# Patient Record
Sex: FEMALE | Race: WHITE | NOT HISPANIC OR LATINO | Employment: OTHER | URBAN - METROPOLITAN AREA
[De-identification: names, ages, dates, MRNs, and addresses within clinical notes are randomized per-mention and may not be internally consistent; named-entity substitution may affect disease eponyms.]

---

## 2017-01-05 ENCOUNTER — HOSPITAL ENCOUNTER (OUTPATIENT)
Dept: PREADMISSION TESTING | Facility: HOSPITAL | Age: 77
Discharge: HOME | End: 2017-01-05
Attending: ORTHOPAEDIC SURGERY
Payer: MEDICARE

## 2017-01-05 VITALS
HEIGHT: 64 IN | SYSTOLIC BLOOD PRESSURE: 128 MMHG | BODY MASS INDEX: 36.77 KG/M2 | DIASTOLIC BLOOD PRESSURE: 70 MMHG | RESPIRATION RATE: 16 BRPM | WEIGHT: 215.38 LBS | HEART RATE: 59 BPM | TEMPERATURE: 98 F | OXYGEN SATURATION: 97 %

## 2017-01-05 DIAGNOSIS — Z01.818 PRE-OP TESTING: Primary | ICD-10-CM

## 2017-01-05 LAB
ANION GAP SERPL CALC-SCNC: 10 MMOL/L
BASOPHILS # BLD AUTO: 0.02 K/UL
BASOPHILS NFR BLD: 0.2 %
BUN SERPL-MCNC: 26 MG/DL
CALCIUM SERPL-MCNC: 10.2 MG/DL
CHLORIDE SERPL-SCNC: 103 MMOL/L
CO2 SERPL-SCNC: 26 MMOL/L
CREAT SERPL-MCNC: 1.4 MG/DL
DIFFERENTIAL METHOD: NORMAL
EOSINOPHIL # BLD AUTO: 0.2 K/UL
EOSINOPHIL NFR BLD: 2.5 %
ERYTHROCYTE [DISTWIDTH] IN BLOOD BY AUTOMATED COUNT: 14.3 %
EST. GFR  (AFRICAN AMERICAN): 42 ML/MIN/1.73 M^2
EST. GFR  (NON AFRICAN AMERICAN): 36 ML/MIN/1.73 M^2
GLUCOSE SERPL-MCNC: 88 MG/DL
HCT VFR BLD AUTO: 41.5 %
HGB BLD-MCNC: 13.7 G/DL
LYMPHOCYTES # BLD AUTO: 2.8 K/UL
LYMPHOCYTES NFR BLD: 34.7 %
MCH RBC QN AUTO: 30.6 PG
MCHC RBC AUTO-ENTMCNC: 33 %
MCV RBC AUTO: 93 FL
MONOCYTES # BLD AUTO: 0.7 K/UL
MONOCYTES NFR BLD: 8.4 %
NEUTROPHILS # BLD AUTO: 4.4 K/UL
NEUTROPHILS NFR BLD: 54.2 %
PLATELET # BLD AUTO: 231 K/UL
PMV BLD AUTO: 11.2 FL
POTASSIUM SERPL-SCNC: 3.9 MMOL/L
RBC # BLD AUTO: 4.47 M/UL
SODIUM SERPL-SCNC: 139 MMOL/L
WBC # BLD AUTO: 8.09 K/UL

## 2017-01-05 PROCEDURE — 36415 COLL VENOUS BLD VENIPUNCTURE: CPT

## 2017-01-05 PROCEDURE — 80048 BASIC METABOLIC PNL TOTAL CA: CPT

## 2017-01-05 PROCEDURE — 85025 COMPLETE CBC W/AUTO DIFF WBC: CPT

## 2017-01-05 PROCEDURE — 93005 ELECTROCARDIOGRAM TRACING: CPT

## 2017-01-05 RX ORDER — ERGOCALCIFEROL 1.25 MG/1
50000 CAPSULE ORAL
COMMUNITY

## 2017-01-05 RX ORDER — HYDROCODONE BITARTRATE AND ACETAMINOPHEN 5; 325 MG/1; MG/1
1 TABLET ORAL EVERY 8 HOURS PRN
Status: ON HOLD | COMMUNITY
End: 2017-03-21 | Stop reason: HOSPADM

## 2017-01-05 RX ORDER — DILTIAZEM HYDROCHLORIDE 180 MG/1
180 CAPSULE, COATED, EXTENDED RELEASE ORAL DAILY
Status: ON HOLD | COMMUNITY
End: 2019-07-26 | Stop reason: HOSPADM

## 2017-01-05 NOTE — IP AVS SNAPSHOT
[place] [address] [place] [address]  Phone: [phone]           Patient Discharge Instructions    Our goal is to set you up for success. This packet includes information on your condition, medications, and your home care. It will help you to care for yourself so you don't get sicker.     Please ask your nurse if you have any questions.        There are many details to remember when preparing for your surgery. Here is what you will need to do, please ask your nurse if there are more specific instructions and if you have any questions:    1. 24 hours before procedure Do not smoke or drink alcoholic beverages 24 hours prior to your procedure    2. Eating before procedure Do not eat or drink anything 8 hours before your procedure - this includes gum, mints, and candy.     3. Day of procedure Please remove all jewelry for the procedure. If you wear contact lenses, dentures, hearing aids or glasses, bring a container to put them in during your surgery and give to a family member for safekeeping.  If your doctor has scheduled you for an overnight stay, bring a small overnight bag with any personal items that you need.    4. After procedure Make arrangements in advance for transportation home by a responsible adult. It is not safe to drive a vehicle during the 24 hours following surgery.     PLEASE NOTE: You may be contacted the day before your surgery to confirm your surgery date and arrival time. The Surgery schedule has many variables which may affect the time of your surgery case. Family members should be available if your surgery time changes.                ** Verify the list of medication(s) below is accurate and up to date. Carry this with you in case of emergency. If your medications have changed, please notify your healthcare provider.             Medication List      TAKE these medications        Additional Info                      diltiaZEM 180 MG 24 hr capsule    Commonly known as:  CARDIZEM CD   Refills:  0   Dose:  180 mg    Instructions:  Take 180 mg by mouth once daily.     Begin Date    AM    Noon    PM    Bedtime       hydrocodone-acetaminophen 5-325mg 5-325 mg per tablet   Commonly known as:  NORCO   Refills:  0   Dose:  1 tablet    Instructions:  Take 1 tablet by mouth every 8 (eight) hours as needed for Pain.     Begin Date    AM    Noon    PM    Bedtime       levothyroxine 100 MCG tablet   Commonly known as:  SYNTHROID   Refills:  0   Dose:  100 mcg    Instructions:  Take 100 mcg by mouth once daily.     Begin Date    AM    Noon    PM    Bedtime       sertraline 100 MG tablet   Commonly known as:  ZOLOFT   Refills:  0   Dose:  100 mg    Instructions:  Take 100 mg by mouth once daily.     Begin Date    AM    Noon    PM    Bedtime       triamterene-hydrochlorothiazide 37.5-25 mg 37.5-25 mg per tablet   Commonly known as:  MAXZIDE-25   Refills:  0   Dose:  1 tablet    Instructions:  Take 1 tablet by mouth once daily.     Begin Date    AM    Noon    PM    Bedtime       VITAMIN D2 50,000 unit Cap   Refills:  0   Dose:  56198 Units   Generic drug:  ergocalciferol    Instructions:  Take 50,000 Units by mouth every 7 days. Taking on Monday's     Begin Date    AM    Noon    PM    Bedtime                  Please bring to all follow up appointments:    1. A copy of your discharge instructions.  2. All medicines you are currently taking in their original bottles.  3. Identification and insurance card.    Please arrive 15 minutes ahead of scheduled appointment time.    Please call 24 hours in advance if you must reschedule your appointment and/or time.        Your Future Surgeries/Procedures     Jan 09, 2017   Surgery with Sav Green MD   [facility] ([facility])    [address] [place] [place] [address]   [phone]                  Discharge Instructions       For this procedure you will shower at home the night before and also the  "morning of surgery with HIBICLENS soap provided by the pre op nurse. Do not use this soap on your face or genitals. You will shower a third time here at the hospital on the morning of surgery. Rinse completely after each shower.  Please place clean linens on your bed the night before surgery. Please wear fresh clean clothing after each shower.  No shaving of procedural area at least 4-5 days before surgery due to increased risk of skin irritation and/or possible infection.        Your surgery is scheduled for _Monday Jan. 9, 2017_.    Call [phone] between 2 p.m. and 5 p.m. on   _Friday_ to find out your arrival time for the day of your surgery.      Please report to SAME DAY SURGERY UNIT on the 2nd FLOOR at _______ a.m.  Use front door entrance. The doors open at 0530 am.      If you need WHEELCHAIR assistance please call  [phone] from your cell phone or "0"  from the  hospital courtesy phone located to the right after you enter the hospital lobby.      INSTRUCTIONS IMPORTANT!!!  ¨ Do not eat or drink after 12 midnight-including water. OK to brush teeth, no   gum, candy or mints!    ¨ Take only these medicines with a small swallow of water-morning of surgery.  Take Zoloft, Maxide, Cartia, and Levothyroxine am of surgery with swallow of water.      _x___  Prep instructions:    SHOWER   _  _x___  Please shower using Hibiclens soap the night before AND  the morning of  your surgery/procedure. Do not use Hibiclens on your face or genitals        _x___  No powder, lotions or creams to surgical area.  _x___  You may wear only deodorant on the day of surgery.  _x___  Please remove all jewelry, including piercings and leave at home.  .  _x___  Wear loose fitting clothing. Allow for dressings, bandages.  _x___  Stop Aspirin, Ibuprofen, Motrin and Aleve at least 3-5 days before surgery, unless otherwise instructed by your doctor, or the nurse.              You MAY use Tylenol/acetaminophen until day of surgery.    _x___  " "Call MD for temperature above 101 degrees.           I have read or had read and explained to me, and understand the above information.  Additional comments or instructions:Please call   [phone] if you have any questions regarding the instructions above.                 Admission Information     Date & Time Provider Department CSN    1/5/2017  9:00 AM Sav Green MD [facility] [id number]      Care Providers     Provider Role Specialty Primary office phone    Sav Green MD Attending Provider Orthopedic Surgery [phone]      Your Vitals Were     BP Pulse Temp Resp Height Weight    128/70 (BP Location: Left arm, Patient Position: Sitting, BP Method: Automatic) 59 97.7 °F (36.5 °C) (Oral) 16 5' 3.5" (1.613 m) 97.7 kg (215 lb 6.2 oz)    SpO2 BMI             97% 37.56 kg/m2         Recent Lab Values     No lab values to display.      Allergies as of 1/5/2017        Reactions    Aspirin Anaphylaxis    Codeine Itching    Keflex [Cephalexin] Itching    Penicillins Hives      [facility]     [facility] Nurse Care Line - 24/7 Assistance  Unless otherwise directed by your provider, please contact [facility], our nurse care line that is available for 24/7 assistance.     Registered nurses in the [facility] provide clinical advisement, health education, appointment booking, and other advisory services.  Call for this free service at [phone].        Advance Directives     An advance directive is a document which, in the event you are no longer able to make decisions for yourself, tells your healthcare team what kind of treatment you do or do not want to receive, or who you would like to make those decisions for you.  If you do not currently have an advance directive, [facility] encourages you to create one.  For more information call:  [phone]), [phone] ([phone]),  or log on to [email or web address].        Smoking Cessation  "    If you would like to quit smoking:   You may be eligible for free services if you are a [place] resident and started smoking cigarettes before September 1, 1988.  Call the Smoking Cessation [employer] (S[employer]) toll free at (918) 055-0459 or (490) 078-8450.   Call 5-894-QUIT-NOW if you do not meet the above criteria.            Language Assistance Services     ATTENTION: Language assistance services are available, free of charge. Please call [phone].      ATENCIÓN: Si habla manan, tiene a dumont disposición servicios gratuitos de asistencia lingüística. Llame al [phone].     CHÚ Ý: N?u b?n nói Ti?ng Vi?t, có các d?ch v? h? tr? ngôn ng? mi?n phí dành cho b?n. G?i s? [phone].         [facility] complies with applicable Federal civil rights laws and does not discriminate on the basis of race, color, national origin, age, disability, or sex.

## 2017-01-05 NOTE — IP AVS SNAPSHOT
[address] [place] [address]  Phone: [phone]           I have received a copy of my After Visit Summary and discharge instructions from [facility].    INSTRUCTIONS RECEIVED AND UNDERSTOOD BY:                     Patient/Patient Representative: ________________________________________________________________     Date/Time: ________________________________________________________________                     Instructions Given By: ________________________________________________________________     Date/Time: ________________________________________________________________

## 2017-01-05 NOTE — DISCHARGE INSTRUCTIONS
"For this procedure you will shower at home the night before and also the morning of surgery with HIBICLENS soap provided by the pre op nurse. Do not use this soap on your face or genitals. You will shower a third time here at the hospital on the morning of surgery. Rinse completely after each shower.  Please place clean linens on your bed the night before surgery. Please wear fresh clean clothing after each shower.  No shaving of procedural area at least 4-5 days before surgery due to increased risk of skin irritation and/or possible infection.        Your surgery is scheduled for _Monday Jan. 9, 2017_.    Call [phone] between 2 p.m. and 5 p.m. on   _Friday_ to find out your arrival time for the day of your surgery.      Please report to SAME DAY SURGERY UNIT on the 2nd FLOOR at _______ a.m.  Use front door entrance. The doors open at 0530 am.      If you need WHEELCHAIR assistance please call  [phone] from your cell phone or "0"  from the  hospital courtesy phone located to the right after you enter the hospital lobby.      INSTRUCTIONS IMPORTANT!!!  ¨ Do not eat or drink after 12 midnight-including water. OK to brush teeth, no   gum, candy or mints!    ¨ Take only these medicines with a small swallow of water-morning of surgery.  Take Zoloft, Maxide, Cartia, and Levothyroxine am of surgery with swallow of water.      _x___  Prep instructions:    SHOWER   _  _x___  Please shower using Hibiclens soap the night before AND  the morning of  your surgery/procedure. Do not use Hibiclens on your face or genitals        _x___  No powder, lotions or creams to surgical area.  _x___  You may wear only deodorant on the day of surgery.  _x___  Please remove all jewelry, including piercings and leave at home.  .  _x___  Wear loose fitting clothing. Allow for dressings, bandages.  _x___  Stop Aspirin, Ibuprofen, Motrin and Aleve at least 3-5 days before surgery, unless otherwise instructed by your doctor, or the nurse.          "     You MAY use Tylenol/acetaminophen until day of surgery.    _x___  Call MD for temperature above 101 degrees.           I have read or had read and explained to me, and understand the above information.  Additional comments or instructions:Please call   [phone] if you have any questions regarding the instructions above.

## 2017-01-05 NOTE — PRE-PROCEDURE INSTRUCTIONS
Pre operative instructions, medication directives and pain scales/post op pain management discussed with patient. Instructed to wash with Hibiclens as directed before surgery.  All questions were answered. Patient re-assured regarding surgical procedure and day of surgery routine as needed. Patient verbalized understanding of pre-op instructions.

## 2017-01-08 ENCOUNTER — ANESTHESIA EVENT (OUTPATIENT)
Dept: SURGERY | Facility: HOSPITAL | Age: 77
DRG: 467 | End: 2017-01-08
Payer: MEDICARE

## 2017-01-09 ENCOUNTER — ANESTHESIA (OUTPATIENT)
Dept: SURGERY | Facility: HOSPITAL | Age: 77
DRG: 467 | End: 2017-01-09
Payer: MEDICARE

## 2017-01-09 PROBLEM — T84.030A: Status: ACTIVE | Noted: 2017-01-09

## 2017-01-09 PROCEDURE — D9220A PRA ANESTHESIA: Mod: CRNA,,, | Performed by: NURSE ANESTHETIST, CERTIFIED REGISTERED

## 2017-01-09 PROCEDURE — 25000003 PHARM REV CODE 250: Performed by: ANESTHESIOLOGY

## 2017-01-09 PROCEDURE — 63600175 PHARM REV CODE 636 W HCPCS: Performed by: NURSE ANESTHETIST, CERTIFIED REGISTERED

## 2017-01-09 PROCEDURE — D9220A PRA ANESTHESIA: Mod: ANES,,, | Performed by: ANESTHESIOLOGY

## 2017-01-09 PROCEDURE — 25000003 PHARM REV CODE 250: Performed by: ORTHOPAEDIC SURGERY

## 2017-01-09 PROCEDURE — 63600175 PHARM REV CODE 636 W HCPCS: Performed by: ORTHOPAEDIC SURGERY

## 2017-01-09 PROCEDURE — 25000003 PHARM REV CODE 250: Performed by: NURSE ANESTHETIST, CERTIFIED REGISTERED

## 2017-01-09 PROCEDURE — 63600175 PHARM REV CODE 636 W HCPCS

## 2017-01-09 RX ORDER — ONDANSETRON 2 MG/ML
INJECTION INTRAMUSCULAR; INTRAVENOUS
Status: DISCONTINUED | OUTPATIENT
Start: 2017-01-09 | End: 2017-01-09

## 2017-01-09 RX ORDER — PROPOFOL 10 MG/ML
VIAL (ML) INTRAVENOUS
Status: DISCONTINUED | OUTPATIENT
Start: 2017-01-09 | End: 2017-01-09

## 2017-01-09 RX ORDER — METOCLOPRAMIDE HYDROCHLORIDE 5 MG/ML
INJECTION INTRAMUSCULAR; INTRAVENOUS
Status: DISCONTINUED | OUTPATIENT
Start: 2017-01-09 | End: 2017-01-09

## 2017-01-09 RX ORDER — LIDOCAINE HCL/PF 100 MG/5ML
SYRINGE (ML) INTRAVENOUS
Status: DISCONTINUED | OUTPATIENT
Start: 2017-01-09 | End: 2017-01-09

## 2017-01-09 RX ADMIN — BUPIVACAINE HYDROCHLORIDE 3 ML: 5 INJECTION, SOLUTION EPIDURAL; INTRACAUDAL; PERINEURAL at 07:01

## 2017-01-09 RX ADMIN — PROPOFOL 20 MG: 10 INJECTION, EMULSION INTRAVENOUS at 08:01

## 2017-01-09 RX ADMIN — PROPOFOL 20 MG: 10 INJECTION, EMULSION INTRAVENOUS at 10:01

## 2017-01-09 RX ADMIN — PROPOFOL 20 MG: 10 INJECTION, EMULSION INTRAVENOUS at 09:01

## 2017-01-09 RX ADMIN — LIDOCAINE HYDROCHLORIDE 5 ML: 20 INJECTION, SOLUTION INTRAVENOUS at 07:01

## 2017-01-09 RX ADMIN — FENTANYL CITRATE 50 MCG: 50 INJECTION INTRAMUSCULAR; INTRAVENOUS at 10:01

## 2017-01-09 RX ADMIN — PROPOFOL 30 MG: 10 INJECTION, EMULSION INTRAVENOUS at 07:01

## 2017-01-09 RX ADMIN — MIDAZOLAM HYDROCHLORIDE 1 MG: 1 INJECTION, SOLUTION INTRAMUSCULAR; INTRAVENOUS at 07:01

## 2017-01-09 RX ADMIN — FENTANYL CITRATE 50 MCG: 50 INJECTION INTRAMUSCULAR; INTRAVENOUS at 07:01

## 2017-01-09 RX ADMIN — PROPOFOL 30 MG: 10 INJECTION, EMULSION INTRAVENOUS at 10:01

## 2017-01-09 RX ADMIN — METOCLOPRAMIDE 10 MG: 5 INJECTION, SOLUTION INTRAMUSCULAR; INTRAVENOUS at 07:01

## 2017-01-09 RX ADMIN — PROPOFOL 50 MG: 10 INJECTION, EMULSION INTRAVENOUS at 07:01

## 2017-01-09 RX ADMIN — ONDANSETRON 4 MG: 2 INJECTION, SOLUTION INTRAMUSCULAR; INTRAVENOUS at 07:01

## 2017-01-09 RX ADMIN — VANCOMYCIN HYDROCHLORIDE 1500 MG: 1 INJECTION, POWDER, LYOPHILIZED, FOR SOLUTION INTRAVENOUS at 07:01

## 2017-01-09 RX ADMIN — PROPOFOL 30 MG: 10 INJECTION, EMULSION INTRAVENOUS at 08:01

## 2017-01-09 RX ADMIN — SODIUM CHLORIDE, SODIUM LACTATE, POTASSIUM CHLORIDE, AND CALCIUM CHLORIDE: .6; .31; .03; .02 INJECTION, SOLUTION INTRAVENOUS at 06:01

## 2017-01-09 RX ADMIN — SODIUM CHLORIDE, SODIUM LACTATE, POTASSIUM CHLORIDE, AND CALCIUM CHLORIDE: .6; .31; .03; .02 INJECTION, SOLUTION INTRAVENOUS at 08:01

## 2017-01-09 RX ADMIN — PROPOFOL 30 MG: 10 INJECTION, EMULSION INTRAVENOUS at 11:01

## 2017-01-09 RX ADMIN — PROPOFOL 20 MG: 10 INJECTION, EMULSION INTRAVENOUS at 11:01

## 2017-01-09 RX ADMIN — SODIUM CHLORIDE, SODIUM LACTATE, POTASSIUM CHLORIDE, AND CALCIUM CHLORIDE: .6; .31; .03; .02 INJECTION, SOLUTION INTRAVENOUS at 10:01

## 2017-01-09 NOTE — ANESTHESIA PROCEDURE NOTES
Spinal    Diagnosis: right knee pain  Patient location during procedure: holding area  Start time: 1/9/2017 7:01 AM  Timeout: 1/9/2017 7:01 AM  End time: 1/9/2017 7:04 AM  Staffing  Anesthesiologist: ADAM NORTH  Performed by: anesthesiologist   Preanesthetic Checklist  Completed: patient identified, site marked, surgical consent, pre-op evaluation, timeout performed, IV checked, risks and benefits discussed and monitors and equipment checked  Spinal Block  Patient position: sitting  Prep: ChloraPrep  Patient monitoring: heart rate, cardiac monitor and continuous pulse ox  Approach: midline  Location: L4-5  Injection technique: single shot  CSF Fluid: blood-tinged free-flowing CSF  Needle  Needle type: Maria Esther   Needle gauge: 25 G  Needle length: 3.5 in  Additional Documentation: negative aspiration for heme and no paresthesia on injection  Needle localization: anatomical landmarks  Assessment  Ease of block: easy  Patient's tolerance of the procedure: comfortable throughout block and no complaints  Medications:  Bolus administered: 3 mL of 0.5 and 3.0 bupivacaine  Epinephrine added: none  Additional Notes  Easy spinal placement on second pass (at same level as first pass) with spinal needle.

## 2017-01-09 NOTE — ANESTHESIA PREPROCEDURE EVALUATION
01/09/2017  Tyra Soliz is a 76 y.o., female.    [facility] Anesthesia Evaluation    I have reviewed the Patient Summary Reports.    I have reviewed the Nursing Notes.   I have reviewed the Medications.     Review of Systems  Anesthesia Hx:  No problems with previous Anesthesia  History of prior surgery of interest to airway management or planning:  Denies Personal Hx of Anesthesia complications.   Social:  Former Smoker    Cardiovascular:   Exercise tolerance: poor Denies Pacemaker. Hypertension  Denies Valvular problems/Murmurs.  Denies CABG/stent.  Denies Dysrhythmias.   Denies Angina.     Normal echo 2013   Pulmonary:  Pulmonary Normal    Renal/:   Chronic Renal Disease, CRI    Hepatic/GI:  Hepatic/GI Normal    Musculoskeletal:  Spine Disorders: lumbar    Neurological:  Neurology Normal    Endocrine:   Denies Diabetes. Hypothyroidism    Psych:   Psychiatric History          Physical Exam  General:  Obesity    Airway/Jaw/Neck:  Airway Findings: Mouth Opening: Normal Tongue: Normal  General Airway Assessment: Adult  Mallampati: III  TM Distance: 4 - 6 cm  Jaw/Neck Findings:  Neck ROM: Normal ROM      Dental:  Dental Findings: In tact   Chest/Lungs:  Chest/Lungs Findings: Normal Respiratory Rate     Heart/Vascular:  Heart Findings: Rate: Normal        Mental Status:  Mental Status Findings:  Cooperative         Anesthesia Plan  Type of Anesthesia, risks & benefits discussed:  Anesthesia Type:  spinal  Patient's Preference:   Intra-op Monitoring Plan:   Intra-op Monitoring Plan Comments:   Post Op Pain Control Plan:   Post Op Pain Control Plan Comments:   Induction:    Beta Blocker:  Patient is not currently on a Beta-Blocker (No further documentation required).       Informed Consent: Patient understands risks and agrees with Anesthesia plan.  Questions answered. Anesthesia consent signed with  patient.  ASA Score: 3     Day of Surgery Review of History & Physical:    H&P update referred to the provider.         Ready For Surgery From Anesthesia Perspective.

## 2017-01-09 NOTE — TRANSFER OF CARE
"Anesthesia Transfer of Care Note    Patient: Tyra Soliz    Procedure(s) Performed: Procedure(s) (LRB):  REVISION-ARTHROPLASTY-KNEE-TOTAL (Right)    Patient location: PACU    Anesthesia Type: spinal    Transport from OR: Transported from OR on room air with adequate spontaneous ventilation    Post pain: adequate analgesia    Post assessment: no apparent anesthetic complications and tolerated procedure well    Post vital signs: stable    Level of consciousness: awake, alert and oriented    Nausea/Vomiting: no nausea/vomiting    Complications: none          Last vitals:   Visit Vitals    BP (!) 190/66    Pulse 60    Temp 36.5 °C (97.7 °F) (Tympanic)    Resp 14    Ht 5' 3.5" (1.613 m)    Wt 97.7 kg (215 lb 6.2 oz)    SpO2 99%    Breastfeeding No    BMI 37.56 kg/m2     "

## 2017-01-10 PROBLEM — T84.018A FAILURE OF TOTAL KNEE ARTHROPLASTY: Status: ACTIVE | Noted: 2017-01-10

## 2017-01-10 PROBLEM — Z96.659 FAILURE OF TOTAL KNEE ARTHROPLASTY: Status: ACTIVE | Noted: 2017-01-10

## 2017-01-17 ENCOUNTER — PATIENT OUTREACH (OUTPATIENT)
Dept: ADMINISTRATIVE | Facility: CLINIC | Age: 77
End: 2017-01-17
Payer: MEDICARE

## 2017-01-17 NOTE — PATIENT INSTRUCTIONS
Discharge Instructions for Total Knee Replacement  You have undergone knee replacement surgery. The knee joint forms where the thighbone, shinbone, and kneecap meet. The knee joint is supported by muscles and ligaments, and is lined with a cushioning called cartilage. Over time, cartilage wears away. This can make the knee feel stiff and painful. Your doctor replaced your painful joint with a knee prosthesis (artificial joint) to relieve pain and restore movement. Here are some instructions to follow once at home.  Home Care  When you are allowed to shower, carefully wash your incision with soap and water. Rinse the incision well. Then gently pat it dry. Dont rub the incision, or apply creams or lotions. And sit on a shower stool or chair when you shower to keep from falling.  Take pain medication as directed by your doctor.  Sitting and Sleeping  Sit in chairs with arms. The arms make it easier for you to stand up or sit down.  Dont sit for more than 30-45 minutes at one time.  Nap if you are tired, but dont stay in bed all day.  Sleep with a pillow under your ankle, not your knee. Be sure to change the position of your leg during the night.  Moving Safely  The key to successful recovery is movement is walking and exercising your knee as directed by your doctor.  Walk up and down stairs with support. Try one step at a time--good knee up, bad knee down. Use the railing if possible.  Dont drive until your doctor says its okay. Most people can start driving about 6 weeks after surgery. Dont drive while you are taking narcotic pain medication.  Other Precautions  Avoid soaking your knee in water (no hot tubs, bathtubs, swimming pools) until your doctor says its okay.  Wear the support stockings you were given in the hospital, as instructed by your doctor. You may wear these stockings for 4-6 weeks after surgery. If needed, you can place a bandage over the incision to prevent irritation from clothing or support  stockings.  Arrange your household to keep the items you need handy. Keep everything else out of the way. Remove items that may cause you to fall, such as throw rugs and electrical cords.  Use nonslip bath mats, grab bars, an elevated toilet seat, and a shower chair in your bathroom.  Until your balance, flexibility, and strength improve, use a cane, crutches, a walker, handrails, or someone to help you.  Keep your hands free by using a backpack, sintia pack, apron, or pockets to carry things.  Prevent infection. Ask your doctor for instructions if you havent already received them. Any infection will need to be treated immediately with antibiotics. Call your doctor right away if you think you might have an infection.  Tell your dentist that you have an artificial joint and take antibiotics as prescribed before any dental work.  Tell all your healthcare providers about your artificial joint before any medical procedure.  Maintain a healthy weight. Get help to lose any extra pounds. Added body weight puts stress on the knee.  Take any medication you may have been given after surgery. This may include blood-thinning medications to prevent blood clots or antibiotics to prevent infection.  Follow-Up  Make a follow-up appointment as directed by our staff. You will need to have your staples removed 2-3 weeks following surgery.    When to Seek Medical Attention  Call 911 right away if you have:  Chest pain.  Shortness of breath.  Any pain or tenderness in your calf.  Otherwise, call your doctor immediately if you have:  Fever of 100.4°F higher, or shaking chills.  Stiffness, or inability to move the knee.  Increased swelling in your leg.  Increased redness, tenderness, or swelling in or around the knee incision.  Drainage from the knee incision.  Increased knee pain.   © 2299-6557 [employer], [address] [facility], [place] [address]. All rights reserved. This information is not intended as a substitute for  professional medical care. Always follow your healthcare professional's instructions.

## 2017-02-21 ENCOUNTER — PATIENT MESSAGE (OUTPATIENT)
Dept: CASE MANAGEMENT | Facility: HOSPITAL | Age: 77
End: 2017-02-21

## 2017-03-19 ENCOUNTER — HOSPITAL ENCOUNTER (INPATIENT)
Facility: HOSPITAL | Age: 77
LOS: 2 days | Discharge: HOME | DRG: 871 | End: 2017-03-21
Attending: EMERGENCY MEDICINE | Admitting: INTERNAL MEDICINE
Payer: MEDICARE

## 2017-03-19 DIAGNOSIS — R07.89 CHEST PAIN, NON-CARDIAC: ICD-10-CM

## 2017-03-19 DIAGNOSIS — R05.9 COUGH IN ADULT PATIENT: ICD-10-CM

## 2017-03-19 DIAGNOSIS — J18.9 PNEUMONIA OF BOTH LOWER LOBES DUE TO INFECTIOUS ORGANISM: Primary | ICD-10-CM

## 2017-03-19 DIAGNOSIS — A41.9 SEPSIS, DUE TO UNSPECIFIED ORGANISM: ICD-10-CM

## 2017-03-19 LAB
ALBUMIN SERPL BCP-MCNC: 3.3 G/DL
ALP SERPL-CCNC: 90 U/L
ALT SERPL W/O P-5'-P-CCNC: 8 U/L
ANION GAP SERPL CALC-SCNC: 9 MMOL/L
AST SERPL-CCNC: 12 U/L
BASOPHILS # BLD AUTO: 0.03 K/UL
BASOPHILS NFR BLD: 0.2 %
BILIRUB SERPL-MCNC: 0.7 MG/DL
BNP SERPL-MCNC: 30 PG/ML
BUN SERPL-MCNC: 12 MG/DL
CALCIUM SERPL-MCNC: 10 MG/DL
CHLORIDE SERPL-SCNC: 99 MMOL/L
CO2 SERPL-SCNC: 27 MMOL/L
CREAT SERPL-MCNC: 1.2 MG/DL
DIFFERENTIAL METHOD: ABNORMAL
EOSINOPHIL # BLD AUTO: 0.1 K/UL
EOSINOPHIL NFR BLD: 0.5 %
ERYTHROCYTE [DISTWIDTH] IN BLOOD BY AUTOMATED COUNT: 15.3 %
EST. GFR  (AFRICAN AMERICAN): 50 ML/MIN/1.73 M^2
EST. GFR  (NON AFRICAN AMERICAN): 44 ML/MIN/1.73 M^2
GLUCOSE SERPL-MCNC: 133 MG/DL
HCT VFR BLD AUTO: 34.8 %
HGB BLD-MCNC: 11.4 G/DL
LIPASE SERPL-CCNC: 8 U/L
LYMPHOCYTES # BLD AUTO: 1.7 K/UL
LYMPHOCYTES NFR BLD: 9.9 %
MCH RBC QN AUTO: 28.4 PG
MCHC RBC AUTO-ENTMCNC: 32.8 %
MCV RBC AUTO: 87 FL
MONOCYTES # BLD AUTO: 1.8 K/UL
MONOCYTES NFR BLD: 10.6 %
NEUTROPHILS # BLD AUTO: 13.3 K/UL
NEUTROPHILS NFR BLD: 78.5 %
PLATELET # BLD AUTO: 275 K/UL
PMV BLD AUTO: 10.8 FL
POTASSIUM SERPL-SCNC: 4.1 MMOL/L
PROT SERPL-MCNC: 7.5 G/DL
RBC # BLD AUTO: 4.02 M/UL
SODIUM SERPL-SCNC: 135 MMOL/L
WBC # BLD AUTO: 16.91 K/UL

## 2017-03-19 PROCEDURE — 85025 COMPLETE CBC W/AUTO DIFF WBC: CPT

## 2017-03-19 PROCEDURE — 25000242 PHARM REV CODE 250 ALT 637 W/ HCPCS: Performed by: EMERGENCY MEDICINE

## 2017-03-19 PROCEDURE — 63600175 PHARM REV CODE 636 W HCPCS: Performed by: EMERGENCY MEDICINE

## 2017-03-19 PROCEDURE — 94640 AIRWAY INHALATION TREATMENT: CPT

## 2017-03-19 PROCEDURE — 83880 ASSAY OF NATRIURETIC PEPTIDE: CPT

## 2017-03-19 PROCEDURE — 96375 TX/PRO/DX INJ NEW DRUG ADDON: CPT

## 2017-03-19 PROCEDURE — 83690 ASSAY OF LIPASE: CPT

## 2017-03-19 PROCEDURE — 96365 THER/PROPH/DIAG IV INF INIT: CPT

## 2017-03-19 PROCEDURE — 80053 COMPREHEN METABOLIC PANEL: CPT

## 2017-03-19 PROCEDURE — 25000003 PHARM REV CODE 250: Performed by: EMERGENCY MEDICINE

## 2017-03-19 PROCEDURE — 12000002 HC ACUTE/MED SURGE SEMI-PRIVATE ROOM

## 2017-03-19 PROCEDURE — 87070 CULTURE OTHR SPECIMN AEROBIC: CPT

## 2017-03-19 PROCEDURE — 87205 SMEAR GRAM STAIN: CPT

## 2017-03-19 PROCEDURE — 99285 EMERGENCY DEPT VISIT HI MDM: CPT | Mod: 25

## 2017-03-19 RX ORDER — IPRATROPIUM BROMIDE AND ALBUTEROL SULFATE 2.5; .5 MG/3ML; MG/3ML
3 SOLUTION RESPIRATORY (INHALATION)
Status: COMPLETED | OUTPATIENT
Start: 2017-03-19 | End: 2017-03-19

## 2017-03-19 RX ORDER — SERTRALINE HYDROCHLORIDE 50 MG/1
100 TABLET, FILM COATED ORAL DAILY
Status: DISCONTINUED | OUTPATIENT
Start: 2017-03-20 | End: 2017-03-21 | Stop reason: HOSPADM

## 2017-03-19 RX ORDER — ENOXAPARIN SODIUM 100 MG/ML
40 INJECTION SUBCUTANEOUS EVERY 24 HOURS
Status: DISCONTINUED | OUTPATIENT
Start: 2017-03-19 | End: 2017-03-21 | Stop reason: HOSPADM

## 2017-03-19 RX ORDER — DIPHENHYDRAMINE HCL 25 MG
25 CAPSULE ORAL EVERY 6 HOURS PRN
Status: DISCONTINUED | OUTPATIENT
Start: 2017-03-19 | End: 2017-03-21 | Stop reason: HOSPADM

## 2017-03-19 RX ORDER — MOXIFLOXACIN HYDROCHLORIDE 400 MG/250ML
400 INJECTION, SOLUTION INTRAVENOUS
Status: COMPLETED | OUTPATIENT
Start: 2017-03-19 | End: 2017-03-19

## 2017-03-19 RX ORDER — SODIUM CHLORIDE 9 MG/ML
INJECTION, SOLUTION INTRAVENOUS CONTINUOUS
Status: DISCONTINUED | OUTPATIENT
Start: 2017-03-19 | End: 2017-03-21 | Stop reason: HOSPADM

## 2017-03-19 RX ORDER — MEROPENEM AND SODIUM CHLORIDE 1 G/50ML
1 INJECTION, SOLUTION INTRAVENOUS
Status: DISCONTINUED | OUTPATIENT
Start: 2017-03-19 | End: 2017-03-20

## 2017-03-19 RX ORDER — MOXIFLOXACIN HYDROCHLORIDE 400 MG/250ML
400 INJECTION, SOLUTION INTRAVENOUS
Status: DISCONTINUED | OUTPATIENT
Start: 2017-03-19 | End: 2017-03-19

## 2017-03-19 RX ORDER — DIPHENHYDRAMINE HYDROCHLORIDE 50 MG/ML
50 INJECTION INTRAMUSCULAR; INTRAVENOUS
Status: COMPLETED | OUTPATIENT
Start: 2017-03-19 | End: 2017-03-19

## 2017-03-19 RX ORDER — LEVOTHYROXINE SODIUM 100 UG/1
100 TABLET ORAL DAILY
Status: DISCONTINUED | OUTPATIENT
Start: 2017-03-20 | End: 2017-03-21 | Stop reason: HOSPADM

## 2017-03-19 RX ORDER — IPRATROPIUM BROMIDE AND ALBUTEROL SULFATE 2.5; .5 MG/3ML; MG/3ML
3 SOLUTION RESPIRATORY (INHALATION) EVERY 4 HOURS
Status: DISCONTINUED | OUTPATIENT
Start: 2017-03-20 | End: 2017-03-20

## 2017-03-19 RX ORDER — OXYCODONE AND ACETAMINOPHEN 7.5; 325 MG/1; MG/1
1 TABLET ORAL EVERY 4 HOURS PRN
Status: DISCONTINUED | OUTPATIENT
Start: 2017-03-19 | End: 2017-03-21 | Stop reason: HOSPADM

## 2017-03-19 RX ORDER — ONDANSETRON 2 MG/ML
4 INJECTION INTRAMUSCULAR; INTRAVENOUS EVERY 12 HOURS PRN
Status: DISCONTINUED | OUTPATIENT
Start: 2017-03-19 | End: 2017-03-21 | Stop reason: HOSPADM

## 2017-03-19 RX ORDER — DILTIAZEM HYDROCHLORIDE 180 MG/1
180 CAPSULE, COATED, EXTENDED RELEASE ORAL DAILY
Status: DISCONTINUED | OUTPATIENT
Start: 2017-03-20 | End: 2017-03-21 | Stop reason: HOSPADM

## 2017-03-19 RX ADMIN — MEROPENEM AND SODIUM CHLORIDE 1 G: 1 INJECTION, SOLUTION INTRAVENOUS at 11:03

## 2017-03-19 RX ADMIN — IPRATROPIUM BROMIDE AND ALBUTEROL SULFATE 3 ML: .5; 3 SOLUTION RESPIRATORY (INHALATION) at 11:03

## 2017-03-19 RX ADMIN — IPRATROPIUM BROMIDE AND ALBUTEROL SULFATE 3 ML: .5; 3 SOLUTION RESPIRATORY (INHALATION) at 05:03

## 2017-03-19 RX ADMIN — AZITHROMYCIN MONOHYDRATE 500 MG: 500 INJECTION, POWDER, LYOPHILIZED, FOR SOLUTION INTRAVENOUS at 09:03

## 2017-03-19 RX ADMIN — OXYCODONE HYDROCHLORIDE AND ACETAMINOPHEN 1 TABLET: 7.5; 325 TABLET ORAL at 09:03

## 2017-03-19 RX ADMIN — ENOXAPARIN SODIUM 40 MG: 100 INJECTION SUBCUTANEOUS at 11:03

## 2017-03-19 RX ADMIN — DIPHENHYDRAMINE HYDROCHLORIDE 50 MG: 50 INJECTION, SOLUTION INTRAMUSCULAR; INTRAVENOUS at 08:03

## 2017-03-19 RX ADMIN — MOXIFLOXACIN HYDROCHLORIDE 400 MG: 400 INJECTION, SOLUTION INTRAVENOUS at 08:03

## 2017-03-19 RX ADMIN — SODIUM CHLORIDE: 0.9 INJECTION, SOLUTION INTRAVENOUS at 11:03

## 2017-03-19 NOTE — ED PROVIDER NOTES
Encounter Date: 3/19/2017    SCRIBE #1 NOTE: I, Lisha Cristina , am scribing for, and in the presence of,  Rome Pineda MD. I have scribed the following portions of the note - Other sections scribed: HPI and ROS .       History     Chief Complaint   Patient presents with    Shortness of Breath     shortness of breath, cough.. and congestion... productive green cough     Review of patient's allergies indicates:   Allergen Reactions    Aspirin Anaphylaxis    Codeine Itching    Keflex [cephalexin] Itching    Penicillins Hives     HPI Comments: CC: Multiple complaints    HPI: This 75 y/o female with HTN, CKD, thyroid disease presents to the ED for evaluation of a productive cough (green sputum) that began yesterday with SOB, chest pain and intermittent fevers (highest at home: 102). Pt's chest pain is worse and sharp with cough. Pt denies N/V/D, dysuria or other symptoms. Pt denies asthma, COPD or emphysema. Pt denies CHF. Pt denies smoking, drinking. No prior attempted treatment or alleviating factors.     The history is provided by the patient. No  was used.     Past Medical History:   Diagnosis Date    Chronic kidney disease     stage 3     Hypertension     Thyroid disease      Past Surgical History:   Procedure Laterality Date    ABDOMINAL SURGERY      removal scar tissue abdomen    BACK SURGERY      x3    CHOLECYSTECTOMY      HYSTERECTOMY      JOINT REPLACEMENT      Rt total knee    REVISION OF SCAR TISSUE RECTUS MUSCLE      after gall bladder surgery     History reviewed. No pertinent family history.  Social History   Substance Use Topics    Smoking status: Former Smoker     Quit date: 1998    Smokeless tobacco: Never Used    Alcohol use No     Review of Systems   Constitutional: Positive for fever (intermittent ). Negative for chills.   HENT: Positive for congestion, rhinorrhea and sore throat. Negative for ear pain.    Eyes: Negative for pain.   Respiratory: Positive for  cough (green sputum) and shortness of breath.    Cardiovascular: Positive for chest pain.   Gastrointestinal: Positive for abdominal pain (epigastric). Negative for diarrhea, nausea and vomiting.   Genitourinary: Negative for dysuria and hematuria.   Musculoskeletal: Positive for myalgias (generalized).   Skin: Negative for rash.   Neurological: Positive for headaches (frontal).       Physical Exam   Initial Vitals   BP Pulse Resp Temp SpO2   03/19/17 1616 03/19/17 1616 03/19/17 1616 03/19/17 1616 03/19/17 1616   123/66 99 22 98.6 °F (37 °C) 96 %     Physical Exam  The patient was examined specifically for the following:   General:No significant distress, Good color, Warm and dry. Head and neck:Scalp atraumatic, Neck supple. Neurological:Appropriate conversation, Gross motor deficits. Eyes:Conjugate gaze, Clear corneas. ENT: No epistaxis. Cardiac: Regular rate and rhythm, Grossly normal heart tones. Pulmonary: Wheezing, Rales. Gastrointestinal: Abdominal tenderness, Abdominal distention. Musculoskeletal: Extremity deformity, Apparent pain with range of motion of the joints. Skin: Rash.   The findings on examination were normal except for the following: The patient has tenderness of the costal margins bilaterally.  There is mild epigastric tenderness.  The patient is coughing during the physical examination.  Oxygen saturations are 96%.  Her heart rate is 86 at the time of this dictation.  The patient is in no severe respiratory distress.  There is no low pelvic tenderness.   ED Course   Procedures  Labs Reviewed   COMPREHENSIVE METABOLIC PANEL - Abnormal; Notable for the following:        Result Value    Sodium 135 (*)     Glucose 133 (*)     Albumin 3.3 (*)     ALT 8 (*)     eGFR if  50 (*)     eGFR if non  44 (*)     All other components within normal limits   CBC W/ AUTO DIFFERENTIAL - Abnormal; Notable for the following:     WBC 16.91 (*)     Hemoglobin 11.4 (*)     Hematocrit  34.8 (*)     RDW 15.3 (*)     Gran # 13.3 (*)     Mono # 1.8 (*)     Gran% 78.5 (*)     Lymph% 9.9 (*)     All other components within normal limits   CULTURE, RESPIRATORY   B-TYPE NATRIURETIC PEPTIDE   LIPASE     EKG Readings: (Independently Interpreted)   Patient's EKG reveals a sinus rhythm with a heart rate of 86.  The OH QRS and QT intervals are normal.  There is no evidence of acute myocardial infarction or malignant arrhythmia.       X-Rays:   Independently Interpreted Readings:   Other Readings:  Chest x-ray reveals patchy infiltrates.    Medical decision making: Given the above, this patient presented emergency room complaining of shortness of breath cough productive of green sputum.  Her chest x-ray shows patchy pneumonia.  The patient reports a fever of 102° at home.  She is afebrile here.  Patient has intense pain with coughing.  She complains of shortness of breath.  Her oxygen saturation is 96%.  I discussed this case at length with internal medicine.  The patient will be admitted for IV Avelox treatment and nebulizer treatments.  I will treat her for pain she has sharp chest pain with coughing.  Her troponin is negative I doubt myocardial infarction.  The white blood count is 16,000.  This patient may really bacteremia.  Sepsis is considered.  Blood cultures were done.  A sputum culture will be performed.  The patient will be treated with IV Avelox.  There is no clinical evidence of pulmonary edema.  There is mild bronchospastic disease.  This patient was a cigarette smoker.     The patient developed a rash at her IV site after being treated with Avelox.  I will switch her to meropenem and Zithromax.  The patient has a history of ALLERGY to cephalosporins. I believe she is now ALLERGIC to Avelox.               Scribe Attestation:   Scribe #1: I performed the above scribed service and the documentation accurately describes the services I performed. I attest to the accuracy of the note.    Attending  Attestation:           Physician Attestation for Scribe:  Physician Attestation Statement for Scribe #1: I, Rome Pineda MD, reviewed documentation, as scribed by Lisha Cristina  in my presence, and it is both accurate and complete.                 ED Course     Clinical Impression:   The primary encounter diagnosis was Pneumonia of both lower lobes due to infectious organism. Diagnoses of Cough in adult patient and Chest pain, non-cardiac were also pertinent to this visit.          Rome Pineda MD  03/19/17 0617

## 2017-03-19 NOTE — ED TRIAGE NOTES
Pt presents to ER with complaints of a cough that is productive. Pt states the cough started yesterday and that she had a sore throat for a few days prior. Pt states that she has had fever on and off as well. Pt also complains of a headache.

## 2017-03-19 NOTE — IP AVS SNAPSHOT
[place] [address] [address] [place] [address]  Phone: [phone]           Patient Discharge Instructions     Our goal is to set you up for success. This packet includes information on your condition, medications, and your home care. It will help you to care for yourself so you don't get sicker and need to go back to the hospital.     Please ask your nurse if you have any questions.        There are many details to remember when preparing to leave the hospital. Here is what you will need to do:    1. Take your medicine. If you are prescribed medications, review your Medication List in the following pages. You may have new medications to  at the pharmacy and others that you'll need to stop taking. Review the instructions for how and when to take your medications. Talk with your doctor or nurses if you are unsure of what to do.     2. Go to your follow-up appointments. Specific follow-up information is listed in the following pages. Your may be contacted by a transition nurse or clinical provider about future appointments. Be sure we have all of the phone numbers to reach you, if needed. Please contact your provider's office if you are unable to make an appointment.     3. Watch for warning signs. Your doctor or nurse will give you detailed warning signs to watch for and when to call for assistance. These instructions may also include educational information about your condition. If you experience any of warning signs to your health, call your doctor.               ** Verify the list of medication(s) below is accurate and up to date. Carry this with you in case of emergency. If your medications have changed, please notify your healthcare provider.             Medication List      START taking these medications        Additional Info                      azithromycin 500 MG tablet   Commonly known as:  ZITHROMAX Z-LORENZA   Quantity:  5 tablet   Refills:  0   Dose:  500 mg     Instructions:  Take 1 tablet (500 mg total) by mouth once daily.     Begin Date    AM    Noon    PM    Bedtime       promethazine-codeine 6.25-10 mg/5 ml 6.25-10 mg/5 mL syrup   Commonly known as:  PHENERGAN with CODEINE   Quantity:  120 mL   Refills:  0   Dose:  5 mL    Instructions:  Take 5 mLs by mouth every 4 (four) hours as needed for Cough.     Begin Date    AM    Noon    PM    Bedtime         CONTINUE taking these medications        Additional Info                      diltiaZEM 180 MG 24 hr capsule   Commonly known as:  CARDIZEM CD   Refills:  0   Dose:  180 mg    Last time this was given:  180 mg on 3/21/2017  8:57 AM   Instructions:  Take 180 mg by mouth once daily.     Begin Date    AM    Noon    PM    Bedtime       diphenhydrAMINE 25 mg capsule   Commonly known as:  BENADRYL   Refills:  0   Dose:  25 mg    Last time this was given:  25 mg on 3/21/2017 12:32 AM   Instructions:  Take 1 each (25 mg total) by mouth every 6 (six) hours as needed for Itching.     Begin Date    AM    Noon    PM    Bedtime       levothyroxine 100 MCG tablet   Commonly known as:  SYNTHROID   Refills:  0   Dose:  100 mcg    Last time this was given:  100 mcg on 3/21/2017  6:02 AM   Instructions:  Take 100 mcg by mouth once daily.     Begin Date    AM    Noon    PM    Bedtime       oxycodone-acetaminophen 7.5-325 mg per tablet   Commonly known as:  PERCOCET   Quantity:  60 tablet   Refills:  0   Dose:  1 tablet    Last time this was given:  1 tablet on 3/20/2017  8:40 PM   Instructions:  Take 1 tablet by mouth every 4 (four) hours as needed.     Begin Date    AM    Noon    PM    Bedtime       sertraline 100 MG tablet   Commonly known as:  ZOLOFT   Refills:  0   Dose:  100 mg    Last time this was given:  100 mg on 3/21/2017  8:57 AM   Instructions:  Take 100 mg by mouth once daily.     Begin Date    AM    Noon    PM    Bedtime       triamterene-hydrochlorothiazide 37.5-25 mg 37.5-25 mg per tablet   Commonly known as:  MAXZIDE-25    Refills:  0   Dose:  1 tablet    Instructions:  Take 1 tablet by mouth once daily.     Begin Date    AM    Noon    PM    Bedtime       VITAMIN D2 50,000 unit Cap   Refills:  0   Dose:  92539 Units   Generic drug:  ergocalciferol    Instructions:  Take 50,000 Units by mouth every 7 days. Taking on Monday's     Begin Date    AM    Noon    PM    Bedtime         STOP taking these medications     hydrocodone-acetaminophen 5-325mg 5-325 mg per tablet   Commonly known as:  NORCO         ASK your doctor about these medications        Additional Info                      senna-docusate 8.6-50 mg 8.6-50 mg per tablet   Commonly known as:  PERICOLACE   Refills:  0   Dose:  1 tablet    Instructions:  Take 1 tablet by mouth 2 (two) times daily.     Begin Date    AM    Noon    PM    Bedtime            Where to Get Your Medications      These medications were sent to [id number] [address] [place] [address] [address]     Phone:  [phone]     azithromycin 500 MG tablet    promethazine-codeine 6.25-10 mg/5 ml 6.25-10 mg/5 mL syrup                  Please bring to all follow up appointments:    1. A copy of your discharge instructions.  2. All medicines you are currently taking in their original bottles.  3. Identification and insurance card.    Please arrive 15 minutes ahead of scheduled appointment time.    Please call 24 hours in advance if you must reschedule your appointment and/or time.        Follow-up Information     Follow up with Marii Bauer MD. Go on 3/28/2018.    Specialty:  Internal Medicine    Why:  For Appointment on Wednesday 3/28/2017 @ 11:15AM    Contact information:    [address] [place] [id number]  [phone]          Discharge Instructions     Future Orders    Activity as tolerated     Diet general     Questions:    Total calories:      Fat restriction, if any:      Protein restriction, if any:      Na restriction, if any:       "Fluid restriction:      Additional restrictions:        Discharge References/Attachments     AZITHROMYCIN TABLETS (ENGLISH)    CODEINE; PROMETHAZINE ORAL SYRUP (ENGLISH)        Primary Diagnosis     Your primary diagnosis was:  Pneumonia Of Both Lower Lobes Due To Infectious Organism      Admission Information     Date & Time Provider Department [facility]    3/19/2017  4:20 PM Marii Bauer MD [facility] [id number]      Care Providers     Provider Role Specialty Primary office phone    Marii Bauer MD Attending Provider Internal Medicine [phone]      Your Vitals Were     BP Pulse Temp Resp Height Weight    157/65 (BP Location: Right arm, Patient Position: Lying, BP Method: Automatic) 75 98.5 °F (36.9 °C) (Oral) 20 5' 3.5" (1.613 m) 95.9 kg (211 lb 6.4 oz)    SpO2 BMI             95% 36.86 kg/m2         Recent Lab Values     No lab values to display.      Allergies as of 3/21/2017        Reactions    Aspirin Anaphylaxis    Codeine Itching    Keflex [Cephalexin] Itching    Moxifloxacin Itching    Penicillins Hives      [facility]     [facility] Nurse Care Line - 24/7 Assistance  Unless otherwise directed by your provider, please contact [facility], our nurse care line that is available for 24/7 assistance.     Registered nurses in the [facility] provide clinical advisement, health education, appointment booking, and other advisory services.  Call for this free service at [phone].        Advance Directives     An advance directive is a document which, in the event you are no longer able to make decisions for yourself, tells your healthcare team what kind of treatment you do or do not want to receive, or who you would like to make those decisions for you.  If you do not currently have an advance directive, [facility] encourages you to create one.  For more information call:  [phone]), [phone] ([phone]),  or log on to " [email or web address].        Smoking Cessation     If you would like to quit smoking:   You may be eligible for free services if you are a [place] resident and started smoking cigarettes before September 1, 1988.  Call the Smoking Cessation [employer] (SCT) toll free at (479) 441-2593 or (936) 571-3442.   Call 1-800-QUIT-NOW if you do not meet the above criteria.            Language Assistance Services     ATTENTION: Language assistance services are available, free of charge. Please call [phone].      ATENCIÓN: Si habla español, tiene a dumont disposición servicios gratuitos de asistencia lingüística. Llame al [phone].     CHÚ Ý: N?u b?n nói Ti?ng Vi?t, có các d?ch v? h? tr? ngôn ng? mi?n phí dành cho b?n. G?i s? [phone].        Pneumonmia Discharge Instructions                Chronic Kindey Disease Education              [facility] complies with applicable Federal civil rights laws and does not discriminate on the basis of race, color, national origin, age, disability, or sex.

## 2017-03-20 PROBLEM — I12.9 BENIGN HYPERTENSIVE KIDNEY DISEASE WITH CHRONIC KIDNEY DISEASE STAGE I THROUGH STAGE IV, OR UNSPECIFIED(403.10): Status: ACTIVE | Noted: 2017-03-20

## 2017-03-20 PROBLEM — E03.4 HYPOTHYROIDISM DUE TO ACQUIRED ATROPHY OF THYROID: Status: ACTIVE | Noted: 2017-03-20

## 2017-03-20 PROBLEM — A41.9 SEPSIS: Status: ACTIVE | Noted: 2017-03-20

## 2017-03-20 PROBLEM — N25.81 SECONDARY HYPERPARATHYROIDISM (OF RENAL ORIGIN): Status: ACTIVE | Noted: 2017-03-20

## 2017-03-20 PROBLEM — N18.30 CHRONIC KIDNEY DISEASE, STAGE III (MODERATE): Status: ACTIVE | Noted: 2017-03-20

## 2017-03-20 LAB
ANION GAP SERPL CALC-SCNC: 10 MMOL/L
ANISOCYTOSIS BLD QL SMEAR: SLIGHT
BASOPHILS # BLD AUTO: 0.03 K/UL
BASOPHILS NFR BLD: 0.2 %
BUN SERPL-MCNC: 15 MG/DL
CALCIUM SERPL-MCNC: 9.7 MG/DL
CHLORIDE SERPL-SCNC: 100 MMOL/L
CO2 SERPL-SCNC: 26 MMOL/L
CREAT SERPL-MCNC: 1.2 MG/DL
DIFFERENTIAL METHOD: ABNORMAL
EOSINOPHIL # BLD AUTO: 0 K/UL
EOSINOPHIL NFR BLD: 0.2 %
ERYTHROCYTE [DISTWIDTH] IN BLOOD BY AUTOMATED COUNT: 15.7 %
EST. GFR  (AFRICAN AMERICAN): 50 ML/MIN/1.73 M^2
EST. GFR  (NON AFRICAN AMERICAN): 44 ML/MIN/1.73 M^2
GLUCOSE SERPL-MCNC: 124 MG/DL
HCT VFR BLD AUTO: 34.8 %
HGB BLD-MCNC: 11 G/DL
LYMPHOCYTES # BLD AUTO: 1.5 K/UL
LYMPHOCYTES NFR BLD: 9.3 %
MCH RBC QN AUTO: 28 PG
MCHC RBC AUTO-ENTMCNC: 31.6 %
MCV RBC AUTO: 89 FL
MONOCYTES # BLD AUTO: 2.2 K/UL
MONOCYTES NFR BLD: 13.3 %
NEUTROPHILS # BLD AUTO: 12.6 K/UL
NEUTROPHILS NFR BLD: 77 %
PLATELET # BLD AUTO: 268 K/UL
PMV BLD AUTO: 11.4 FL
POLYCHROMASIA BLD QL SMEAR: ABNORMAL
POTASSIUM SERPL-SCNC: 3.8 MMOL/L
RBC # BLD AUTO: 3.93 M/UL
SODIUM SERPL-SCNC: 136 MMOL/L
WBC # BLD AUTO: 16.48 K/UL

## 2017-03-20 PROCEDURE — 80048 BASIC METABOLIC PNL TOTAL CA: CPT

## 2017-03-20 PROCEDURE — 63600175 PHARM REV CODE 636 W HCPCS: Performed by: EMERGENCY MEDICINE

## 2017-03-20 PROCEDURE — 36415 COLL VENOUS BLD VENIPUNCTURE: CPT

## 2017-03-20 PROCEDURE — 25000003 PHARM REV CODE 250: Performed by: EMERGENCY MEDICINE

## 2017-03-20 PROCEDURE — 94761 N-INVAS EAR/PLS OXIMETRY MLT: CPT

## 2017-03-20 PROCEDURE — 25000242 PHARM REV CODE 250 ALT 637 W/ HCPCS: Performed by: EMERGENCY MEDICINE

## 2017-03-20 PROCEDURE — 94640 AIRWAY INHALATION TREATMENT: CPT

## 2017-03-20 PROCEDURE — 25000242 PHARM REV CODE 250 ALT 637 W/ HCPCS: Performed by: INTERNAL MEDICINE

## 2017-03-20 PROCEDURE — 85025 COMPLETE CBC W/AUTO DIFF WBC: CPT

## 2017-03-20 PROCEDURE — 25000003 PHARM REV CODE 250: Performed by: INTERNAL MEDICINE

## 2017-03-20 PROCEDURE — 12000002 HC ACUTE/MED SURGE SEMI-PRIVATE ROOM

## 2017-03-20 RX ORDER — SIMETHICONE 80 MG
1 TABLET,CHEWABLE ORAL 3 TIMES DAILY PRN
Status: DISCONTINUED | OUTPATIENT
Start: 2017-03-20 | End: 2017-03-21 | Stop reason: HOSPADM

## 2017-03-20 RX ORDER — IPRATROPIUM BROMIDE AND ALBUTEROL SULFATE 2.5; .5 MG/3ML; MG/3ML
3 SOLUTION RESPIRATORY (INHALATION)
Status: DISCONTINUED | OUTPATIENT
Start: 2017-03-20 | End: 2017-03-21 | Stop reason: HOSPADM

## 2017-03-20 RX ADMIN — OXYCODONE HYDROCHLORIDE AND ACETAMINOPHEN 1 TABLET: 7.5; 325 TABLET ORAL at 09:03

## 2017-03-20 RX ADMIN — IPRATROPIUM BROMIDE AND ALBUTEROL SULFATE 3 ML: .5; 3 SOLUTION RESPIRATORY (INHALATION) at 03:03

## 2017-03-20 RX ADMIN — IPRATROPIUM BROMIDE AND ALBUTEROL SULFATE 3 ML: .5; 3 SOLUTION RESPIRATORY (INHALATION) at 08:03

## 2017-03-20 RX ADMIN — DILTIAZEM HYDROCHLORIDE 180 MG: 180 CAPSULE, COATED, EXTENDED RELEASE ORAL at 09:03

## 2017-03-20 RX ADMIN — SIMETHICONE CHEW TAB 80 MG 80 MG: 80 TABLET ORAL at 01:03

## 2017-03-20 RX ADMIN — SERTRALINE HYDROCHLORIDE 100 MG: 50 TABLET ORAL at 09:03

## 2017-03-20 RX ADMIN — IPRATROPIUM BROMIDE AND ALBUTEROL SULFATE 3 ML: .5; 3 SOLUTION RESPIRATORY (INHALATION) at 02:03

## 2017-03-20 RX ADMIN — OXYCODONE HYDROCHLORIDE AND ACETAMINOPHEN 1 TABLET: 7.5; 325 TABLET ORAL at 08:03

## 2017-03-20 RX ADMIN — AZITHROMYCIN MONOHYDRATE 500 MG: 500 INJECTION, POWDER, LYOPHILIZED, FOR SOLUTION INTRAVENOUS at 09:03

## 2017-03-20 RX ADMIN — MEROPENEM AND SODIUM CHLORIDE 1 G: 1 INJECTION, SOLUTION INTRAVENOUS at 05:03

## 2017-03-20 RX ADMIN — LEVOTHYROXINE SODIUM 100 MCG: 100 TABLET ORAL at 05:03

## 2017-03-20 RX ADMIN — SODIUM CHLORIDE: 0.9 INJECTION, SOLUTION INTRAVENOUS at 05:03

## 2017-03-20 RX ADMIN — ENOXAPARIN SODIUM 40 MG: 100 INJECTION SUBCUTANEOUS at 05:03

## 2017-03-20 NOTE — PLAN OF CARE
Problem: Fall Risk (Adult)  Goal: Absence of Falls  Patient will demonstrate the desired outcomes by discharge/transition of care.   Outcome: Ongoing (interventions implemented as appropriate)  Patient remains free of falls, call bell within reach, bed alarm on.    Problem: Pneumonia (Adult)  Goal: Signs and Symptoms of Listed Potential Problems Will be Absent, Minimized or Managed (Pneumonia)  Signs and symptoms of listed potential problems will be absent, minimized or managed by discharge/transition of care (reference Pneumonia (Adult) CPG).  Outcome: Ongoing (interventions implemented as appropriate)  Patient being treated with IVAB's for pneumonia, and receiving breathing treatments every 4 hours.

## 2017-03-20 NOTE — H&P
[facility] Hospital Medicine  History & Physical    Patient Name: Tyra Soliz  MRN: [id number]  Admission Date: 3/19/2017  Attending Physician: Marii Bauer MD   Primary Care Provider: Marii Bauer MD         Patient information was obtained from patient and ER records.     Subjective:     Principal Problem:<principal problem not specified>    Chief Complaint:   Chief Complaint   Patient presents with    Shortness of Breath     shortness of breath, cough.. and congestion... productive green cough        HPI: 75yo female presenting to the ER with complaints of cough, congestion and shortness of breath for a couple of days.  She denies fever.  Has chest pain when coughing.  She has some abdominal pain as well.  CXR showed infiltrates and pt admitted for pneumonia    Past Medical History:   Diagnosis Date    Chronic kidney disease     stage 3     Hypertension     Thyroid disease        Past Surgical History:   Procedure Laterality Date    ABDOMINAL SURGERY      removal scar tissue abdomen    BACK SURGERY      x3    CHOLECYSTECTOMY      HYSTERECTOMY      JOINT REPLACEMENT      Rt total knee    REVISION OF SCAR TISSUE RECTUS MUSCLE      after gall bladder surgery       Review of patient's allergies indicates:   Allergen Reactions    Aspirin Anaphylaxis    Codeine Itching    Keflex [cephalexin] Itching    Moxifloxacin Itching    Penicillins Hives     Patient was given Avelox in the emergency room but immediately developed a rash and red lines around her IV    No current facility-administered medications on file prior to encounter.      Current Outpatient Prescriptions on File Prior to Encounter   Medication Sig    diltiaZEM (CARDIZEM CD) 180 MG 24 hr capsule Take 180 mg by mouth once daily.    diphenhydrAMINE (BENADRYL) 25 mg capsule Take 1 each (25 mg total) by mouth every 6 (six) hours as needed for Itching.    ergocalciferol (VITAMIN D2) 50,000 unit Cap Take 50,000  Units by mouth every 7 days. Taking on Monday's    hydrocodone-acetaminophen 5-325mg (NORCO) 5-325 mg per tablet Take 1 tablet by mouth every 8 (eight) hours as needed for Pain.    levothyroxine (SYNTHROID) 100 MCG tablet Take 100 mcg by mouth once daily.    sertraline (ZOLOFT) 100 MG tablet Take 100 mg by mouth once daily.    triamterene-hydrochlorothiazide 37.5-25 mg (MAXZIDE-25) 37.5-25 mg per tablet Take 1 tablet by mouth once daily.    oxycodone-acetaminophen (PERCOCET) 7.5-325 mg per tablet Take 1 tablet by mouth every 4 (four) hours as needed.     Family History     None        Social History Main Topics    Smoking status: Former Smoker     Quit date: 1998    Smokeless tobacco: Never Used    Alcohol use No    Drug use: No    Sexual activity: No     Review of Systems   All other systems reviewed and are negative.    Objective:     Vital Signs (Most Recent):  Temp: 97.9 °F (36.6 °C) (03/20/17 0547)  Pulse: 75 (03/20/17 0547)  Resp: 18 (03/20/17 0547)  BP: (!) 122/59 (03/20/17 0547)  SpO2: 98 % (03/20/17 0547) Vital Signs (24h Range):  Temp:  [97.9 °F (36.6 °C)-98.8 °F (37.1 °C)] 97.9 °F (36.6 °C)  Pulse:  [75-99] 75  Resp:  [18-22] 18  SpO2:  [94 %-98 %] 98 %  BP: (119-146)/(56-82) 122/59     Weight: 94.4 kg (208 lb 3.2 oz)  Body mass index is 36.3 kg/(m^2).    Physical Exam   Constitutional: She is oriented to person, place, and time. She appears well-developed and well-nourished.   HENT:   Head: Normocephalic and atraumatic.   Nose: Nose normal.   Eyes: EOM are normal. Pupils are equal, round, and reactive to light.   Neck: Normal range of motion. Neck supple.   Cardiovascular: Normal rate, regular rhythm and normal heart sounds.  Exam reveals no gallop and no friction rub.    No murmur heard.  Pulmonary/Chest: Effort normal. She has wheezes in the right lower field and the left lower field.   Abdominal: Soft. Bowel sounds are normal.   Neurological: She is alert and oriented to person, place, and  time.   Skin: Skin is warm and dry.   Psychiatric: She has a normal mood and affect.   Vitals reviewed.       Significant Labs:   CBC:   Recent Labs  Lab 03/19/17 1655 03/20/17  0553   WBC 16.91* 16.48*   HGB 11.4* 11.0*   HCT 34.8* 34.8*    268     CMP:   Recent Labs  Lab 03/19/17  1655 03/20/17  0553   * 136   K 4.1 3.8   CL 99 100   CO2 27 26   * 124*   BUN 12 15   CREATININE 1.2 1.2   CALCIUM 10.0 9.7   PROT 7.5  --    ALBUMIN 3.3*  --    BILITOT 0.7  --    ALKPHOS 90  --    AST 12  --    ALT 8*  --    ANIONGAP 9 10   EGFRNONAA 44* 44*     Cardiac Markers:   Recent Labs  Lab 03/19/17 1655   BNP 30     POCT Glucose: No results for input(s): POCTGLUCOSE in the last 48 hours.    Significant Imaging: CT: I have reviewed all pertinent results/findings within the past 24 hours and my personal findings are:  Left midlung zone subtle opacities concerning for airspace disease such as pneumonia. Recommend short-term followup with chest radiography after therapy to ensure resolution.    Assessment/Plan:     Pneumonia of both lower lobes due to infectious organism    Pt on Azithromycin.  Repeat CXR in the AM.      Sepsis  Monitor WBC's and continue antibiotics.  RR improved      Benign hypertensive kidney disease with chronic kidney disease stage I through stage IV, or unspecified  Continue current meds.  Diuretics on hold      Chronic kidney disease, stage III (moderate)  Pt's labs are at baseline.  Will monitor    Hypothyroidism due to acquired atrophy of thyroid  Continue levothyroxine      Secondary hyperparathyroidism (of renal origin)  PT on weekly vitamin D      VTE Risk Mitigation         Ordered     enoxaparin injection 40 mg  Daily     Route:  Subcutaneous        03/19/17 2313     Medium Risk of VTE  Once      03/19/17 2316        Marii Bauer MD  Department of Hospital Medicine   [facility]

## 2017-03-20 NOTE — PLAN OF CARE
03/20/17 1241   Discharge Assessment   Assessment Type Discharge Planning Assessment   Confirmed/corrected address and phone number on facesheet? Yes   Assessment information obtained from? Patient   Expected Length of Stay (days) 3   Communicated expected length of stay with patient/caregiver yes   Type of Healthcare Directive Received (No healthcare directive)   Prior to hospitilization cognitive status: Alert/Oriented   Prior to hospitalization functional status: Independent   Current cognitive status: Alert/Oriented   Current Functional Status: Independent   Arrived From home or self-care   Lives With spouse;child(jerrica), adult;grandchild(jerrica)   Able to Return to Prior Arrangements yes   Is patient able to care for self after discharge? Yes   How many people do you have in your home that can help with your care after discharge? 3   Who are your caregiver(s) and their phone number(s)? Spouse, Dain Soliz [phone]    Patient's perception of discharge disposition home or selfcare;home health   Readmission Within The Last 30 Days no previous admission in last 30 days   Patient currently being followed by outpatient case management? No   Patient currently receives home health services? No   Does the patient currently use HME? No   Patient currently receives private duty nursing? No   Patient currently receives any other outside agency services? No   Equipment Currently Used at Home walker, rolling   Do you have any problems affording any of your prescribed medications? No   Is the patient taking medications as prescribed? yes   Do you have any financial concerns preventing you from receiving the healthcare you need? No   Does the patient have transportation to healthcare appointments? Yes   Transportation Available car   On Dialysis? No   Does the patient receive services at the Coumadin Clinic? No   Are there any open cases? No   Discharge Plan A Home with family   Discharge Plan B Home with family;Home  Health   Patient/Family In Agreement With Plan yes   TN explained duties of Case Management to patient. Contact information added to white board, TN explained process to contact TN for any additional questions or concerns. Blue folder given to patient. She was informed to leave folder at bedside and we will add any needed paperwork to folder during hospital stay.       [id number] - [address] [address] - [address]  [facility] [facility]  [place] [address]  Phone: [phone] Fax: [phone]

## 2017-03-20 NOTE — PROGRESS NOTES
The pt was received on RA with a sat of 97%. No distress was noted at the time. Her breath sounds are diminished, slightly coarse. She tolerated her treatment with RIZWAN.

## 2017-03-20 NOTE — SUBJECTIVE & OBJECTIVE
Past Medical History:   Diagnosis Date    Chronic kidney disease     stage 3     Hypertension     Thyroid disease        Past Surgical History:   Procedure Laterality Date    ABDOMINAL SURGERY      removal scar tissue abdomen    BACK SURGERY      x3    CHOLECYSTECTOMY      HYSTERECTOMY      JOINT REPLACEMENT      Rt total knee    REVISION OF SCAR TISSUE RECTUS MUSCLE      after gall bladder surgery       Review of patient's allergies indicates:   Allergen Reactions    Aspirin Anaphylaxis    Codeine Itching    Keflex [cephalexin] Itching    Moxifloxacin Itching    Penicillins Hives     Patient was given Avelox in the emergency room but immediately developed a rash and red lines around her IV    No current facility-administered medications on file prior to encounter.      Current Outpatient Prescriptions on File Prior to Encounter   Medication Sig    diltiaZEM (CARDIZEM CD) 180 MG 24 hr capsule Take 180 mg by mouth once daily.    diphenhydrAMINE (BENADRYL) 25 mg capsule Take 1 each (25 mg total) by mouth every 6 (six) hours as needed for Itching.    ergocalciferol (VITAMIN D2) 50,000 unit Cap Take 50,000 Units by mouth every 7 days. Taking on Monday's    hydrocodone-acetaminophen 5-325mg (NORCO) 5-325 mg per tablet Take 1 tablet by mouth every 8 (eight) hours as needed for Pain.    levothyroxine (SYNTHROID) 100 MCG tablet Take 100 mcg by mouth once daily.    sertraline (ZOLOFT) 100 MG tablet Take 100 mg by mouth once daily.    triamterene-hydrochlorothiazide 37.5-25 mg (MAXZIDE-25) 37.5-25 mg per tablet Take 1 tablet by mouth once daily.    oxycodone-acetaminophen (PERCOCET) 7.5-325 mg per tablet Take 1 tablet by mouth every 4 (four) hours as needed.     Family History     None        Social History Main Topics    Smoking status: Former Smoker     Quit date: 1998    Smokeless tobacco: Never Used    Alcohol use No    Drug use: No    Sexual activity: No     Review of Systems   All other  systems reviewed and are negative.    Objective:     Vital Signs (Most Recent):  Temp: 97.9 °F (36.6 °C) (03/20/17 0547)  Pulse: 75 (03/20/17 0547)  Resp: 18 (03/20/17 0547)  BP: (!) 122/59 (03/20/17 0547)  SpO2: 98 % (03/20/17 0547) Vital Signs (24h Range):  Temp:  [97.9 °F (36.6 °C)-98.8 °F (37.1 °C)] 97.9 °F (36.6 °C)  Pulse:  [75-99] 75  Resp:  [18-22] 18  SpO2:  [94 %-98 %] 98 %  BP: (119-146)/(56-82) 122/59     Weight: 94.4 kg (208 lb 3.2 oz)  Body mass index is 36.3 kg/(m^2).    Physical Exam   Constitutional: She is oriented to person, place, and time. She appears well-developed and well-nourished.   HENT:   Head: Normocephalic and atraumatic.   Nose: Nose normal.   Eyes: EOM are normal. Pupils are equal, round, and reactive to light.   Neck: Normal range of motion. Neck supple.   Cardiovascular: Normal rate, regular rhythm and normal heart sounds.  Exam reveals no gallop and no friction rub.    No murmur heard.  Pulmonary/Chest: Effort normal. She has wheezes in the right lower field and the left lower field.   Abdominal: Soft. Bowel sounds are normal.   Neurological: She is alert and oriented to person, place, and time.   Skin: Skin is warm and dry.   Psychiatric: She has a normal mood and affect.   Vitals reviewed.       Significant Labs:   CBC:   Recent Labs  Lab 03/19/17 1655 03/20/17  0553   WBC 16.91* 16.48*   HGB 11.4* 11.0*   HCT 34.8* 34.8*    268     CMP:   Recent Labs  Lab 03/19/17 1655 03/20/17  0553   * 136   K 4.1 3.8   CL 99 100   CO2 27 26   * 124*   BUN 12 15   CREATININE 1.2 1.2   CALCIUM 10.0 9.7   PROT 7.5  --    ALBUMIN 3.3*  --    BILITOT 0.7  --    ALKPHOS 90  --    AST 12  --    ALT 8*  --    ANIONGAP 9 10   EGFRNONAA 44* 44*     Cardiac Markers:   Recent Labs  Lab 03/19/17  1655   BNP 30     POCT Glucose: No results for input(s): POCTGLUCOSE in the last 48 hours.    Significant Imaging: CT: I have reviewed all pertinent results/findings within the past 24 hours  and my personal findings are:  Left midlung zone subtle opacities concerning for airspace disease such as pneumonia. Recommend short-term followup with chest radiography after therapy to ensure resolution.

## 2017-03-20 NOTE — PROGRESS NOTES
Follow up appt scheduled with [place] at Dr. Bauer's office [phone] for Tuesday 4/3/2017 @ 11AM

## 2017-03-21 VITALS
DIASTOLIC BLOOD PRESSURE: 58 MMHG | OXYGEN SATURATION: 95 % | HEIGHT: 64 IN | BODY MASS INDEX: 36.09 KG/M2 | TEMPERATURE: 99 F | WEIGHT: 211.38 LBS | RESPIRATION RATE: 20 BRPM | SYSTOLIC BLOOD PRESSURE: 113 MMHG | HEART RATE: 75 BPM

## 2017-03-21 LAB
ANION GAP SERPL CALC-SCNC: 6 MMOL/L
BACTERIA SPEC AEROBE CULT: NORMAL
BASOPHILS # BLD AUTO: 0.02 K/UL
BASOPHILS NFR BLD: 0.2 %
BUN SERPL-MCNC: 16 MG/DL
CALCIUM SERPL-MCNC: 9.5 MG/DL
CHLORIDE SERPL-SCNC: 107 MMOL/L
CO2 SERPL-SCNC: 27 MMOL/L
CREAT SERPL-MCNC: 1.1 MG/DL
DIFFERENTIAL METHOD: ABNORMAL
EOSINOPHIL # BLD AUTO: 0.3 K/UL
EOSINOPHIL NFR BLD: 2.4 %
ERYTHROCYTE [DISTWIDTH] IN BLOOD BY AUTOMATED COUNT: 15.7 %
EST. GFR  (AFRICAN AMERICAN): 56 ML/MIN/1.73 M^2
EST. GFR  (NON AFRICAN AMERICAN): 49 ML/MIN/1.73 M^2
GLUCOSE SERPL-MCNC: 106 MG/DL
GRAM STN SPEC: NORMAL
HCT VFR BLD AUTO: 32.6 %
HGB BLD-MCNC: 10.3 G/DL
LYMPHOCYTES # BLD AUTO: 2 K/UL
LYMPHOCYTES NFR BLD: 16.5 %
MCH RBC QN AUTO: 28.3 PG
MCHC RBC AUTO-ENTMCNC: 31.6 %
MCV RBC AUTO: 90 FL
MONOCYTES # BLD AUTO: 1.3 K/UL
MONOCYTES NFR BLD: 11 %
NEUTROPHILS # BLD AUTO: 8.4 K/UL
NEUTROPHILS NFR BLD: 69.9 %
PLATELET # BLD AUTO: 260 K/UL
PMV BLD AUTO: 11 FL
POTASSIUM SERPL-SCNC: 4.5 MMOL/L
RBC # BLD AUTO: 3.64 M/UL
SODIUM SERPL-SCNC: 140 MMOL/L
WBC # BLD AUTO: 11.97 K/UL

## 2017-03-21 PROCEDURE — 25000242 PHARM REV CODE 250 ALT 637 W/ HCPCS: Performed by: INTERNAL MEDICINE

## 2017-03-21 PROCEDURE — 94761 N-INVAS EAR/PLS OXIMETRY MLT: CPT

## 2017-03-21 PROCEDURE — 36415 COLL VENOUS BLD VENIPUNCTURE: CPT

## 2017-03-21 PROCEDURE — 25000003 PHARM REV CODE 250: Performed by: EMERGENCY MEDICINE

## 2017-03-21 PROCEDURE — 94640 AIRWAY INHALATION TREATMENT: CPT

## 2017-03-21 PROCEDURE — 85025 COMPLETE CBC W/AUTO DIFF WBC: CPT

## 2017-03-21 PROCEDURE — 80048 BASIC METABOLIC PNL TOTAL CA: CPT

## 2017-03-21 RX ORDER — PROMETHAZINE HYDROCHLORIDE AND CODEINE PHOSPHATE 6.25; 1 MG/5ML; MG/5ML
5 SOLUTION ORAL EVERY 4 HOURS PRN
Qty: 120 ML | Refills: 0 | Status: SHIPPED | OUTPATIENT
Start: 2017-03-21 | End: 2017-03-31

## 2017-03-21 RX ORDER — AZITHROMYCIN 500 MG/1
500 TABLET, FILM COATED ORAL DAILY
Qty: 5 TABLET | Refills: 0 | Status: SHIPPED | OUTPATIENT
Start: 2017-03-21 | End: 2017-03-26

## 2017-03-21 RX ADMIN — DILTIAZEM HYDROCHLORIDE 180 MG: 180 CAPSULE, COATED, EXTENDED RELEASE ORAL at 08:03

## 2017-03-21 RX ADMIN — IPRATROPIUM BROMIDE AND ALBUTEROL SULFATE 3 ML: .5; 3 SOLUTION RESPIRATORY (INHALATION) at 08:03

## 2017-03-21 RX ADMIN — LEVOTHYROXINE SODIUM 100 MCG: 100 TABLET ORAL at 06:03

## 2017-03-21 RX ADMIN — SODIUM CHLORIDE: 0.9 INJECTION, SOLUTION INTRAVENOUS at 06:03

## 2017-03-21 RX ADMIN — DIPHENHYDRAMINE HYDROCHLORIDE 25 MG: 25 CAPSULE ORAL at 12:03

## 2017-03-21 RX ADMIN — SERTRALINE HYDROCHLORIDE 100 MG: 50 TABLET ORAL at 08:03

## 2017-03-21 NOTE — PROGRESS NOTES
WRITTEN HEALTHCARE DISCHARGE INFORMATION     If you are unable to make your follow up appointments, please call the number listed and reschedule this appointment.     After discharge, if you need assistance, you can call [facility] Nurse Care Line for 24/7 assistance at [phone]    Thank you for choosing [facility] and allowing us to care for you.   From your care management team: Marichuy Owen [phone] or [phone]     You should receive a call from [facility] Discharge Department within 48-72 hours to help manage your care after discharge. Please try to make sure that you answer your phone for this important phone call.     Follow-up Information     Follow up with Marii Bauer MD. Go on 3/28/2018.    Specialty:  Internal Medicine    Why:  For Appointment on Wednesday 3/28/2017 @ 11:15AM    Contact information:    [address] [facility] [address]  [facility] [address]  [phone]

## 2017-03-21 NOTE — PLAN OF CARE
Problem: Fall Risk (Adult)  Goal: Absence of Falls  Patient will demonstrate the desired outcomes by discharge/transition of care.   Outcome: Ongoing (interventions implemented as appropriate)    03/21/17 0634   Fall Risk (Adult)   Absence of Falls making progress toward outcome         Problem: Patient Care Overview  Goal: Plan of Care Review  Outcome: Ongoing (interventions implemented as appropriate)    03/21/17 0634   Coping/Psychosocial   Plan Of Care Reviewed With patient         Problem: Pneumonia (Adult)  Goal: Signs and Symptoms of Listed Potential Problems Will be Absent, Minimized or Managed (Pneumonia)  Signs and symptoms of listed potential problems will be absent, minimized or managed by discharge/transition of care (reference Pneumonia (Adult) CPG).   Outcome: Ongoing (interventions implemented as appropriate)    03/21/17 0634   Pneumonia   Problems Assessed (Pneumonia) infection progression   Problems Present (Pneumonia) infection progression

## 2017-03-21 NOTE — NURSING
Discharge instructions provided to pt regarding 24/7 on call nurse line. Pt educated on medication changes with verbal education and handout on new medications: azithromycin, and promethazine with codeine syrup. Copy of education provided to pt. Pt education on which medications to continue taking and which one to stop. Follow up information given to pt. Copy of instructions given to pt. Pt verbalized understanding of all teaching. Pt stable and ready for discharge.

## 2017-03-21 NOTE — NURSING
Call placed to Dr. Renetta Bauer. No discharge orders in. Pt stated MD said she is discharge. Orders to be put in per MD.

## 2017-03-21 NOTE — PROGRESS NOTES
Pt on room air with oxygen sats of 95%, breathsounds are clear/diminished.  Aerosol tx given as ordered and pt tolerated tx without RIZWAN.  Side effects of medication explain to pt

## 2017-03-21 NOTE — PHYSICIAN QUERY
"PT Name: Tyra Soliz  MR #: [id number]    Physician Query Form - Pneumonia Clarification     CDS/: Maria Eugenia Goodrich RN, CCDS               Contact information: [phone]  Maria Eugenia Jackman  This form is a permanent document in the medical record.    Query Date:  March 21, 2017    By submitting this query, we are merely seeking further clarification of documentation. Please utilize your independent clinical judgment when addressing the question(s) below.    The Medical record contains the following:   Indicators   Supporting Clinical Findings Location in Medical Record   x "Pneumonia" documented · Pneumonia of both lower lobes due to infectious organism  ·  DC Summary   x Chest X-Ray: CXR show infiltrates   DC Summ    PaO2    PaCO2     O2 sat     x Cultures  Normal resp bonifacio   Lab   x Treatment  Moxifloxacin   IV  Meropenem IV  Azithromycin IV   MAR     Supplemental O2      Other         Provider, please specify type of pneumonia.    [  ] Bacterial Pneumonia (Specify organism): ______________________    [  ] Bacterial, Gram Negative organism Pneumonia    [  ] Aspiration Pneumonia    [  ] Other type of pneumonia (please specify): ______________________________________    [ x ] Clinically undetermined    Please document in your progress notes daily for the duration of treatment, until resolved, and include in your discharge summary.    .                                                                                    "

## 2017-03-21 NOTE — PLAN OF CARE
Problem: Patient Care Overview  Goal: Plan of Care Review  Outcome: Ongoing (interventions implemented as appropriate)  No falls or injury. Pt compliant with fall and safety plan of care. Pt treated with IV fluids and antibiotics. Pt also with productive cough to treat with cough syrup at discharge. Pt repots she has taken the syrup before although it has codeine in it. She states it causes itching but nothing severe. MD approved. Discharge planning in process. Pt not to be discharged until 2 view xray performed.

## 2017-03-21 NOTE — PROGRESS NOTES
TN reviewed follow up appointment information and was informed of [facility]. Patient is in agreement and verbalized an understanding. Placed discharge information in blue discharge folder.  Patient was reminded to call the number written on the dry erase board for any oather questions, concerns, or needs.    Patients nurse, Jacque,  informed that patient can discharge from  standpoint.

## 2017-03-21 NOTE — NURSING
PIV site removed. Site with some mild swelling, no redness, pain, or drainage noted. Gauze and tape pressure dressing applied. Telemetry monitor removed. Pt tolerated well.

## 2017-03-21 NOTE — DISCHARGE SUMMARY
[facility] Hospital Medicine  Discharge Summary      Patient Name: Tyra Soliz  MRN: [id number]  Admission Date: 3/19/2017  Hospital Length of Stay: 2 days  Discharge Date and Time:  03/21/2017 8:18 AM  Attending Physician: Marii Bauer MD   Discharging Provider: Marii Bauer MD  Primary Care Provider: Marii Bauer MD      HPI:   75yo female presenting to the ER with complaints of cough, congestion and shortness of breath for a couple of days.  She denies fever.  Has chest pain when coughing.  She has some abdominal pain as well.  CXR showed infiltrates and pt admitted for pneumonia    * No surgery found *      Indwelling Lines/Drains at time of discharge:   Lines/Drains/Airways          No matching active lines, drains, or airways        Hospital Course:   Pt admitted and started on Avelox and Azithromycin.  Pt developed rash with Avelox and that was discontinued.  Pt continues on Zithromax.  Pt was quickly weaned off of Oxygen and improved with respiratory treatments.  At this time patient will be discharged home on azithromycin and codeine cough meds.  She will followup with med in one week     Consults:     Significant Diagnostic Studies: Labs:   CMP   Recent Labs  Lab 03/19/17  1655 03/20/17  0553 03/21/17  0641   * 136 140   K 4.1 3.8 4.5   CL 99 100 107   CO2 27 26 27   * 124* 106   BUN 12 15 16   CREATININE 1.2 1.2 1.1   CALCIUM 10.0 9.7 9.5   PROT 7.5  --   --    ALBUMIN 3.3*  --   --    BILITOT 0.7  --   --    ALKPHOS 90  --   --    AST 12  --   --    ALT 8*  --   --    ANIONGAP 9 10 6*   ESTGFRAFRICA 50* 50* 56*   EGFRNONAA 44* 44* 49*    and CBC   Recent Labs  Lab 03/19/17  1655 03/20/17  0553 03/21/17  0641   WBC 16.91* 16.48* 11.97   HGB 11.4* 11.0* 10.3*   HCT 34.8* 34.8* 32.6*    268 260       Pending Diagnostic Studies:     Procedure Component Value Units Date/Time    X-Ray Chest PA And Lateral [[id number]]     Order Status:  Sent Lab  Status:  No result         Final Active Diagnoses:    Diagnosis Date Noted POA    Sepsis [A41.9] 03/20/2017 Yes    Benign hypertensive kidney disease with chronic kidney disease stage I through stage IV, or unspecified [I12.9] 03/20/2017 Yes    Chronic kidney disease, stage III (moderate) [N18.3] 03/20/2017 Yes    Hypothyroidism due to acquired atrophy of thyroid [E03.4] 03/20/2017 Yes    Secondary hyperparathyroidism (of renal origin) [N25.81] 03/20/2017 Yes    Pneumonia of both lower lobes due to infectious organism [J18.9] 03/19/2017 Yes      Problems Resolved During this Admission:    Diagnosis Date Noted Date Resolved POA      No new Assessment & Plan notes have been filed under this hospital service since the last note was generated.  Service: Hospital Medicine      Discharged Condition: good    Disposition:     Follow Up:  Follow-up Information     Follow up with Marii Bauer MD. Go on 4/3/2018.    Specialty:  Internal Medicine    Why:  For Appointment on Tuesday 4/3/2017 @ 11AM    Contact information:    [address] [place] [id number]  [phone]          Patient Instructions:   Diet Regular.  Activity as tolerated  Medications:  Reconciled Home Medications:   Current Discharge Medication List      START taking these medications    Details   azithromycin (ZITHROMAX) 500 MG tablet Take 1 tablet (500 mg total) by mouth once daily.  Qty: 5 tablet, Refills: 0      promethazine-codeine 6.25-10 mg/5 ml (PHENERGAN WITH CODEINE) 6.25-10 mg/5 mL syrup Take 5 mLs by mouth every 4 (four) hours as needed for Cough.  Qty: 120 mL, Refills: 0         CONTINUE these medications which have NOT CHANGED    Details   diltiaZEM (CARDIZEM CD) 180 MG 24 hr capsule Take 180 mg by mouth once daily.      diphenhydrAMINE (BENADRYL) 25 mg capsule Take 1 each (25 mg total) by mouth every 6 (six) hours as needed for Itching.  Refills: 0      ergocalciferol (VITAMIN D2) 50,000 unit Cap Take 50,000 Units by  mouth every 7 days. Taking on Monday's      levothyroxine (SYNTHROID) 100 MCG tablet Take 100 mcg by mouth once daily.      sertraline (ZOLOFT) 100 MG tablet Take 100 mg by mouth once daily.      triamterene-hydrochlorothiazide 37.5-25 mg (MAXZIDE-25) 37.5-25 mg per tablet Take 1 tablet by mouth once daily.      oxycodone-acetaminophen (PERCOCET) 7.5-325 mg per tablet Take 1 tablet by mouth every 4 (four) hours as needed.  Qty: 60 tablet, Refills: 0         STOP taking these medications       hydrocodone-acetaminophen 5-325mg (NORCO) 5-325 mg per tablet Comments:   Reason for Stopping:         senna-docusate 8.6-50 mg (PERICOLACE) 8.6-50 mg per tablet Comments:   Reason for Stopping:             Time spent on the discharge of patient: >30 minutes    Marii Bauer MD  Department of Hospital Medicine  [facility]

## 2017-03-23 ENCOUNTER — PATIENT OUTREACH (OUTPATIENT)
Dept: ADMINISTRATIVE | Facility: CLINIC | Age: 77
End: 2017-03-23
Payer: MEDICARE

## 2017-03-23 RX ORDER — HYDROCODONE BITARTRATE AND ACETAMINOPHEN 5; 325 MG/1; MG/1
1 TABLET ORAL EVERY 6 HOURS PRN
COMMUNITY

## 2017-03-23 NOTE — PATIENT INSTRUCTIONS
Discharge Instructions for Pneumonia  You have been diagnosed with pneumonia, a serious lung infection. Most cases of pneumonia are caused by bacteria. Pneumonia most often occurs in older adults, young children, and people with chronic health problems.  Home Care  Take your medication exactly as directed. Dont skip doses. Continue taking your antibiotics as directed until they are all gone--even if you start to feel better. This will prevent the pneumonia from coming back.  Drink at least 8 glasses of water daily, unless directed otherwise. This helps to loosen and thin secretions so that you can cough them up.  Use a cool-mist humidifier in your bedroom. Be sure to clean the humidifier daily.  Coughing up mucus is normal. Dont use medications to suppress your cough unless your cough is dry, painful, or interferes with your sleep. You may use an expectorant if ordered by your doctor.  Warm compresses or a moist heating pad on the lowest setting can be used to relieve chest discomfort. Use several times a day for 15-20 minutes at a time. (To prevent injuring your skin, be sure the temperature of the compress or heating pad is warm, not hot.)  Get plenty of rest until your fever, shortness of breath, and chest pain go away.  Plan to get a flu shot every year.  Ask your doctor about a pneumonia vaccination.  Follow-Up  Make a follow-up appointment as directed by our staff.    When to Seek Medical Attention  Call 911 right away if you have any of the following:  Chest pain  Trouble breathing  Blue lips or fingernails  Otherwise, call your doctor if you have any of the following:  Fever above 100.4°F  Yellow, green, bloody, or smelly sputum  More than normal mucus production  Vomiting   © 5665-5711 [employer], [address] [place] [address], [place] [address]. All rights reserved. This information is not intended as a substitute for professional medical care. Always follow your healthcare professional's instructions.

## 2018-04-16 ENCOUNTER — HOSPITAL ENCOUNTER (EMERGENCY)
Facility: HOSPITAL | Age: 78
Discharge: HOME | End: 2018-04-16
Attending: EMERGENCY MEDICINE
Payer: MEDICARE

## 2018-04-16 VITALS
DIASTOLIC BLOOD PRESSURE: 59 MMHG | SYSTOLIC BLOOD PRESSURE: 121 MMHG | BODY MASS INDEX: 37.92 KG/M2 | RESPIRATION RATE: 16 BRPM | WEIGHT: 214 LBS | TEMPERATURE: 98 F | OXYGEN SATURATION: 94 % | HEART RATE: 64 BPM | HEIGHT: 63 IN
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
## 2018-04-16 DIAGNOSIS — N17.9 AKI (ACUTE KIDNEY INJURY): ICD-10-CM

## 2018-04-16 DIAGNOSIS — S22.31XA CLOSED FRACTURE OF ONE RIB OF RIGHT SIDE, INITIAL ENCOUNTER: Primary | ICD-10-CM

## 2018-04-16 LAB
ALBUMIN SERPL BCP-MCNC: 3.7 G/DL
ALP SERPL-CCNC: 75 U/L
ALT SERPL W/O P-5'-P-CCNC: 13 U/L
ANION GAP SERPL CALC-SCNC: 8 MMOL/L
APTT BLDCRRT: 26.1 SEC
AST SERPL-CCNC: 16 U/L
BACTERIA #/AREA URNS HPF: ABNORMAL /HPF
BASOPHILS # BLD AUTO: 0.03 K/UL
BASOPHILS NFR BLD: 0.5 %
BILIRUB SERPL-MCNC: 0.4 MG/DL
BILIRUB UR QL STRIP: NEGATIVE
BUN SERPL-MCNC: 26 MG/DL
CALCIUM SERPL-MCNC: 10.5 MG/DL
CHLORIDE SERPL-SCNC: 105 MMOL/L
CLARITY UR: ABNORMAL
CO2 SERPL-SCNC: 28 MMOL/L
COLOR UR: YELLOW
CREAT SERPL-MCNC: 1.5 MG/DL
DIFFERENTIAL METHOD: ABNORMAL
EOSINOPHIL # BLD AUTO: 0.2 K/UL
EOSINOPHIL NFR BLD: 2.8 %
ERYTHROCYTE [DISTWIDTH] IN BLOOD BY AUTOMATED COUNT: 14.1 %
EST. GFR  (AFRICAN AMERICAN): 38 ML/MIN/1.73 M^2
EST. GFR  (NON AFRICAN AMERICAN): 33 ML/MIN/1.73 M^2
GLUCOSE SERPL-MCNC: 96 MG/DL
GLUCOSE UR QL STRIP: NEGATIVE
HCT VFR BLD AUTO: 42.1 %
HGB BLD-MCNC: 13.4 G/DL
HGB UR QL STRIP: ABNORMAL
INR PPP: 1
KETONES UR QL STRIP: NEGATIVE
LEUKOCYTE ESTERASE UR QL STRIP: ABNORMAL
LIPASE SERPL-CCNC: 25 U/L
LYMPHOCYTES # BLD AUTO: 1.3 K/UL
LYMPHOCYTES NFR BLD: 20.9 %
MCH RBC QN AUTO: 29.8 PG
MCHC RBC AUTO-ENTMCNC: 31.8 G/DL
MCV RBC AUTO: 94 FL
MICROSCOPIC COMMENT: ABNORMAL
MONOCYTES # BLD AUTO: 0.6 K/UL
MONOCYTES NFR BLD: 8.7 %
NEUTROPHILS # BLD AUTO: 4.2 K/UL
NEUTROPHILS NFR BLD: 66.9 %
NITRITE UR QL STRIP: NEGATIVE
NON-SQ EPI CELLS #/AREA URNS HPF: 1 /HPF
PH UR STRIP: 5 [PH] (ref 5–8)
PLATELET # BLD AUTO: 221 K/UL
PMV BLD AUTO: 11.2 FL
POTASSIUM SERPL-SCNC: 4.3 MMOL/L
PROT SERPL-MCNC: 7.4 G/DL
PROT UR QL STRIP: NEGATIVE
PROTHROMBIN TIME: 10 SEC
RBC # BLD AUTO: 4.5 M/UL
RBC #/AREA URNS HPF: 2 /HPF (ref 0–4)
SODIUM SERPL-SCNC: 141 MMOL/L
SP GR UR STRIP: 1.01 (ref 1–1.03)
SQUAMOUS #/AREA URNS HPF: 5 /HPF
URN SPEC COLLECT METH UR: ABNORMAL
UROBILINOGEN UR STRIP-ACNC: NEGATIVE EU/DL
WBC # BLD AUTO: 6.33 K/UL
WBC #/AREA URNS HPF: 10 /HPF (ref 0–5)

## 2018-04-16 PROCEDURE — 94799 UNLISTED PULMONARY SVC/PX: CPT

## 2018-04-16 PROCEDURE — 99284 EMERGENCY DEPT VISIT MOD MDM: CPT | Mod: 25

## 2018-04-16 PROCEDURE — 85730 THROMBOPLASTIN TIME PARTIAL: CPT

## 2018-04-16 PROCEDURE — 63600175 PHARM REV CODE 636 W HCPCS: Performed by: EMERGENCY MEDICINE

## 2018-04-16 PROCEDURE — 96374 THER/PROPH/DIAG INJ IV PUSH: CPT

## 2018-04-16 PROCEDURE — 80053 COMPREHEN METABOLIC PANEL: CPT

## 2018-04-16 PROCEDURE — 25000003 PHARM REV CODE 250: Performed by: EMERGENCY MEDICINE

## 2018-04-16 PROCEDURE — 85610 PROTHROMBIN TIME: CPT

## 2018-04-16 PROCEDURE — 81000 URINALYSIS NONAUTO W/SCOPE: CPT

## 2018-04-16 PROCEDURE — 83690 ASSAY OF LIPASE: CPT

## 2018-04-16 PROCEDURE — 85025 COMPLETE CBC W/AUTO DIFF WBC: CPT

## 2018-04-16 RX ORDER — MORPHINE SULFATE 4 MG/ML
4 INJECTION, SOLUTION INTRAMUSCULAR; INTRAVENOUS
Status: COMPLETED | OUTPATIENT
Start: 2018-04-16 | End: 2018-04-16

## 2018-04-16 RX ORDER — ONDANSETRON 4 MG/1
4 TABLET, ORALLY DISINTEGRATING ORAL
Status: COMPLETED | OUTPATIENT
Start: 2018-04-16 | End: 2018-04-16

## 2018-04-16 RX ORDER — OXYCODONE AND ACETAMINOPHEN 5; 325 MG/1; MG/1
2 TABLET ORAL
Status: COMPLETED | OUTPATIENT
Start: 2018-04-16 | End: 2018-04-16

## 2018-04-16 RX ORDER — FUROSEMIDE 20 MG/1
20 TABLET ORAL 2 TIMES DAILY
COMMUNITY

## 2018-04-16 RX ADMIN — MORPHINE SULFATE 4 MG: 4 INJECTION INTRAVENOUS at 05:04

## 2018-04-16 RX ADMIN — OXYCODONE HYDROCHLORIDE AND ACETAMINOPHEN 2 TABLET: 5; 325 TABLET ORAL at 08:04

## 2018-04-16 RX ADMIN — ONDANSETRON 4 MG: 4 TABLET, ORALLY DISINTEGRATING ORAL at 05:04

## 2018-04-16 NOTE — ED PROVIDER NOTES
"Encounter Date: 4/16/2018    SCRIBE #1 NOTE: I, Ruddy Avalos, am scribing for, and in the presence of,  Heather Fisher MD. I have scribed the following portions of the note - Other sections scribed: HPI, ROS and PE.       History     Chief Complaint   Patient presents with    Flank Pain     tripped while walking the dog one wk ago; complains of right elbow pain; right knee pain; right flank pain started day after fall; denies hitting head or LOC    Fall     CC: Fall and Chest wall pain    HPI: This 77 y.o F with CKD, HTN and thyroid disease presents to the ED c/o constant and severe (10/10) right anterior lateral chest wall pain secondary to a fall x1 week ago. The pt reports she tripped and fell x1 week ago while walking her dog. The pt states fell forward and states she "hit everything but my face." The pt is not on blood thinners. The pt denies leg pain, numbness, weakness, nausea, emesis, diarrhea, headache, dizziness, syncope and SOB. No prior tx.      The history is provided by the patient. No  was used.     Review of patient's allergies indicates:   Allergen Reactions    Aspirin Anaphylaxis    Codeine Itching    Keflex [cephalexin] Itching    Moxifloxacin Itching    Penicillins Hives     Past Medical History:   Diagnosis Date    Chronic kidney disease     stage 3     Hypertension     Thyroid disease      Past Surgical History:   Procedure Laterality Date    ABDOMINAL SURGERY      removal scar tissue abdomen    BACK SURGERY      x3    CHOLECYSTECTOMY      HYSTERECTOMY      JOINT REPLACEMENT      Rt total knee    REVISION OF SCAR TISSUE RECTUS MUSCLE      after gall bladder surgery     History reviewed. No pertinent family history.  Social History   Substance Use Topics    Smoking status: Former Smoker     Quit date: 1998    Smokeless tobacco: Never Used    Alcohol use No     Review of Systems   Constitutional: Negative for chills, diaphoresis and fever.   HENT: Negative " for congestion, rhinorrhea and sore throat.    Eyes: Negative for redness.   Respiratory: Negative for cough and shortness of breath.    Cardiovascular: Positive for chest pain (R anterior lateral under right breast).   Gastrointestinal: Negative for abdominal pain, diarrhea, nausea and vomiting.   Genitourinary: Negative for dysuria, frequency and urgency.   Musculoskeletal: Negative for back pain and neck pain.   Skin: Negative for rash.   Neurological: Negative for dizziness, numbness and headaches.   Psychiatric/Behavioral: Negative for agitation, behavioral problems and confusion. The patient is not nervous/anxious.    All other systems reviewed and are negative.      Physical Exam     Initial Vitals [04/16/18 1614]   BP Pulse Resp Temp SpO2   127/88 65 20 97.5 °F (36.4 °C) 98 %      MAP       101         Physical Exam    Nursing note and vitals reviewed.  Constitutional: She appears well-developed and well-nourished.  Non-toxic appearance. She does not appear ill.   HENT:   Head: Normocephalic and atraumatic.   Nose: Nose normal.   Eyes: Conjunctivae and EOM are normal. Pupils are equal, round, and reactive to light.   Neck: Normal range of motion. Neck supple.   Cardiovascular: Normal rate, regular rhythm, normal heart sounds and intact distal pulses.   Pulmonary/Chest: Effort normal and breath sounds normal. No respiratory distress. She exhibits tenderness.   Right side anterior lateral chest wall tenderness to palpation.   Abdominal: Soft. Normal appearance and bowel sounds are normal. She exhibits no distension. There is no tenderness.   Musculoskeletal: Normal range of motion.   Neurological: She is alert and oriented to person, place, and time. She has normal strength.   Skin: Skin is warm and dry. Capillary refill takes less than 2 seconds.   Psychiatric: She has a normal mood and affect. Her behavior is normal. Judgment and thought content normal.         ED Course   Procedures  Labs Reviewed   CBC W/  AUTO DIFFERENTIAL - Abnormal; Notable for the following:        Result Value    MCHC 31.8 (*)     All other components within normal limits   COMPREHENSIVE METABOLIC PANEL - Abnormal; Notable for the following:     BUN, Bld 26 (*)     Creatinine 1.5 (*)     eGFR if  38 (*)     eGFR if non  33 (*)     All other components within normal limits   LIPASE   URINALYSIS   PROTIME-INR   APTT        Imaging Results          X-Ray Chest PA And Lateral (Final result)  Result time 04/16/18 16:47:59    Final result by Jaya Maya MD (04/16/18 16:47:59)                 Impression:      No evidence of acute cardiopulmonary disease.    Question minimally displaced fracture of the right 8th rib laterally.  Recommend correlation for point tenderness at this site.      Electronically signed by: Jaya Maya MD  Date:    04/16/2018  Time:    16:47             Narrative:    EXAMINATION:  XR CHEST PA AND LATERAL    CLINICAL HISTORY:  Chest Pain;    TECHNIQUE:  PA and lateral views of the chest were performed.    COMPARISON:  03/21/2017    FINDINGS:  Multiple overlying cardiac monitoring leads. The cardiomediastinal silhouette is normal in size and midline. Pulmonary vascularity appears within normal limits.    The lungs appear clear without confluent pulmonary parenchymal opacity. No pleural fluid.    Question minimally displaced fracture of the right 8th rib laterally.    Cholecystectomy clips.                                     Medical Decision Making:   Differential Diagnosis:   Right Chest Wall Pian.  Rib fracture.  Other:   I have discussed this case with another health care provider.       <> Summary of the Discussion: Patient care was transitioned to Dr. Romel Wilson at shift change. I discussed patient care in the ED in details with Dr. Wilson.  Patient is pending labs and reevaluation prior to disposition.            Scribe Attestation:   Scribe #1: I performed the above scribed service and  the documentation accurately describes the services I performed. I attest to the accuracy of the note.    Attending Attestation:           Physician Attestation for Scribe:  Physician Attestation Statement for Scribe #1: I, Heather Fisher MD, reviewed documentation, as scribed by Ruddy Avalos in my presence, and it is both accurate and complete.     Comments: I, Dr. Fisher, personally performed the services described in this documentation. All medical record entries made by the scribe were at my direction and in my presence.  I have reviewed the chart and agree that the record reflects my personal performance and is accurate and complete.                 Clinical Impression:   The primary encounter diagnosis was Fall, initial encounter. A diagnosis of Closed fracture of one rib of right side, initial encounter was also pertinent to this visit.    Disposition:   Condition: Stable                        Heather Fisher MD  04/16/18 [id number]

## 2018-04-16 NOTE — ED TRIAGE NOTES
Pt to the Ed via personal vehicle with c/o fall x 1 week ago. Pt reports pain to R Rib cage, pt reports falling on ABD. Pt denies LOC and dizziness. Pt reports falling on R elbow and hitting R toes. Pt denies pain to R elbow and foot upon current assessment. Pt reports increasing pain to R rib cage since fall. No acute distress noted. Pt placed on cardiac monitor.

## 2018-04-16 NOTE — ED NOTES
Pt encouraged to void, pt unable to do so at this time. Will attempt again later, pt aware of need for urine specimen.

## 2018-04-17 NOTE — ED PROVIDER NOTES
Encounter Date: 4/16/2018       History     Chief Complaint   Patient presents with    Flank Pain     tripped while walking the dog one wk ago; complains of right elbow pain; right knee pain; right flank pain started day after fall; denies hitting head or LOC    Fall     Patient care transfered at sign out from Dr. Fisher follow up with patient and labs.  Patient with history of fall one week ago with right side rib pain.  Chest x-ray showed possible eighth rib fracture.          Review of patient's allergies indicates:   Allergen Reactions    Aspirin Anaphylaxis    Codeine Itching    Keflex [cephalexin] Itching    Moxifloxacin Itching    Penicillins Hives     Past Medical History:   Diagnosis Date    Chronic kidney disease     stage 3     Hypertension     Thyroid disease      Past Surgical History:   Procedure Laterality Date    ABDOMINAL SURGERY      removal scar tissue abdomen    BACK SURGERY      x3    CHOLECYSTECTOMY      HYSTERECTOMY      JOINT REPLACEMENT      Rt total knee    REVISION OF SCAR TISSUE RECTUS MUSCLE      after gall bladder surgery     History reviewed. No pertinent family history.  Social History   Substance Use Topics    Smoking status: Former Smoker     Quit date: 1998    Smokeless tobacco: Never Used    Alcohol use No     Review of Systems    Physical Exam     Initial Vitals [04/16/18 1614]   BP Pulse Resp Temp SpO2   127/88 65 20 97.5 °F (36.4 °C) 98 %      MAP       101         Physical Exam    ED Course   Procedures  Labs Reviewed   CBC W/ AUTO DIFFERENTIAL - Abnormal; Notable for the following:        Result Value    MCHC 31.8 (*)     All other components within normal limits   COMPREHENSIVE METABOLIC PANEL - Abnormal; Notable for the following:     BUN, Bld 26 (*)     Creatinine 1.5 (*)     eGFR if  38 (*)     eGFR if non  33 (*)     All other components within normal limits   URINALYSIS - Abnormal; Notable for the following:      Appearance, UA Hazy (*)     Occult Blood UA 1+ (*)     Leukocytes, UA 3+ (*)     All other components within normal limits   URINALYSIS MICROSCOPIC - Abnormal; Notable for the following:     WBC, UA 10 (*)     Non-Squam Epith 1 (*)     All other components within normal limits   PROTIME-INR   APTT   LIPASE             Medical Decision Making:   ED Management:  Patient with resident chest wall pain status post fall with possible eighth rib fracture on chest x-ray.  This pain was reproducible on exam on palpation consistent with chest wall injury.  No acute findings on labs.  Incentive spirometry provided with instruction for use.  Patient is stable for discharge.  Other:   I have discussed this case with another health care provider.                      Clinical Impression:   The primary encounter diagnosis was Closed fracture of one rib of right side, initial encounter. Diagnoses of Fall, initial encounter and SANGITA (acute kidney injury) were also pertinent to this visit.                           Romel Wilson MD  04/24/18 2022

## 2018-04-17 NOTE — DISCHARGE INSTRUCTIONS
Usual symptoms spirometry every 1-2 hours while awake for 2 weeks.  Follow-up with your PCP 2-3 days.  Take your prescribed pain medication as needed for the rib pain.  Return to ED if symptoms worsens or if any concerns.

## 2019-06-29 ENCOUNTER — HOSPITAL ENCOUNTER (EMERGENCY)
Facility: HOSPITAL | Age: 79
Discharge: HOME | End: 2019-06-29
Attending: EMERGENCY MEDICINE
Payer: MEDICARE

## 2019-06-29 VITALS
DIASTOLIC BLOOD PRESSURE: 86 MMHG | SYSTOLIC BLOOD PRESSURE: 189 MMHG | HEIGHT: 63 IN | HEART RATE: 71 BPM | RESPIRATION RATE: 20 BRPM | OXYGEN SATURATION: 98 % | WEIGHT: 212 LBS | TEMPERATURE: 98 F | BODY MASS INDEX: 37.56 KG/M2

## 2019-06-29 DIAGNOSIS — F41.9 ANXIETY: ICD-10-CM

## 2019-06-29 DIAGNOSIS — R06.02 SOB (SHORTNESS OF BREATH): ICD-10-CM

## 2019-06-29 DIAGNOSIS — R42 DIZZINESS: Primary | ICD-10-CM

## 2019-06-29 LAB
ALBUMIN SERPL BCP-MCNC: 4 G/DL (ref 3.5–5.2)
ALP SERPL-CCNC: 73 U/L (ref 55–135)
ALT SERPL W/O P-5'-P-CCNC: 12 U/L (ref 10–44)
ANION GAP SERPL CALC-SCNC: 15 MMOL/L (ref 8–16)
AST SERPL-CCNC: 21 U/L (ref 10–40)
BASOPHILS # BLD AUTO: 0.02 K/UL (ref 0–0.2)
BASOPHILS NFR BLD: 0.2 % (ref 0–1.9)
BILIRUB SERPL-MCNC: 0.6 MG/DL (ref 0.1–1)
BUN SERPL-MCNC: 15 MG/DL (ref 8–23)
CALCIUM SERPL-MCNC: 11.2 MG/DL (ref 8.7–10.5)
CHLORIDE SERPL-SCNC: 105 MMOL/L (ref 95–110)
CO2 SERPL-SCNC: 21 MMOL/L (ref 23–29)
CREAT SERPL-MCNC: 1.3 MG/DL (ref 0.5–1.4)
DIFFERENTIAL METHOD: ABNORMAL
EOSINOPHIL # BLD AUTO: 0.1 K/UL (ref 0–0.5)
EOSINOPHIL NFR BLD: 0.8 % (ref 0–8)
ERYTHROCYTE [DISTWIDTH] IN BLOOD BY AUTOMATED COUNT: 13.6 % (ref 11.5–14.5)
EST. GFR  (AFRICAN AMERICAN): 45 ML/MIN/1.73 M^2
EST. GFR  (NON AFRICAN AMERICAN): 39 ML/MIN/1.73 M^2
GLUCOSE SERPL-MCNC: 96 MG/DL (ref 70–110)
HCT VFR BLD AUTO: 42.5 % (ref 37–48.5)
HGB BLD-MCNC: 13.9 G/DL (ref 12–16)
LYMPHOCYTES # BLD AUTO: 2.5 K/UL (ref 1–4.8)
LYMPHOCYTES NFR BLD: 26.1 % (ref 18–48)
MCH RBC QN AUTO: 31.2 PG (ref 27–31)
MCHC RBC AUTO-ENTMCNC: 32.7 G/DL (ref 32–36)
MCV RBC AUTO: 95 FL (ref 82–98)
MONOCYTES # BLD AUTO: 0.8 K/UL (ref 0.3–1)
MONOCYTES NFR BLD: 8 % (ref 4–15)
NEUTROPHILS # BLD AUTO: 6.2 K/UL (ref 1.8–7.7)
NEUTROPHILS NFR BLD: 65.3 % (ref 38–73)
PLATELET # BLD AUTO: 279 K/UL (ref 150–350)
PMV BLD AUTO: 10.9 FL (ref 9.2–12.9)
POTASSIUM SERPL-SCNC: 3.7 MMOL/L (ref 3.5–5.1)
PROT SERPL-MCNC: 8 G/DL (ref 6–8.4)
RBC # BLD AUTO: 4.46 M/UL (ref 4–5.4)
SODIUM SERPL-SCNC: 141 MMOL/L (ref 136–145)
WBC # BLD AUTO: 9.61 K/UL (ref 3.9–12.7)

## 2019-06-29 PROCEDURE — 96360 HYDRATION IV INFUSION INIT: CPT

## 2019-06-29 PROCEDURE — 93010 ELECTROCARDIOGRAM REPORT: CPT | Mod: ,,, | Performed by: INTERNAL MEDICINE

## 2019-06-29 PROCEDURE — 80053 COMPREHEN METABOLIC PANEL: CPT

## 2019-06-29 PROCEDURE — 99285 EMERGENCY DEPT VISIT HI MDM: CPT | Mod: 25

## 2019-06-29 PROCEDURE — 25000003 PHARM REV CODE 250: Performed by: EMERGENCY MEDICINE

## 2019-06-29 PROCEDURE — 93005 ELECTROCARDIOGRAM TRACING: CPT

## 2019-06-29 PROCEDURE — 93010 EKG 12-LEAD: ICD-10-PCS | Mod: 76,,, | Performed by: INTERNAL MEDICINE

## 2019-06-29 PROCEDURE — 85025 COMPLETE CBC W/AUTO DIFF WBC: CPT

## 2019-06-29 RX ORDER — MECLIZINE HYDROCHLORIDE 25 MG/1
25 TABLET ORAL
Status: COMPLETED | OUTPATIENT
Start: 2019-06-29 | End: 2019-06-29

## 2019-06-29 RX ORDER — MECLIZINE HYDROCHLORIDE 25 MG/1
25 TABLET ORAL 3 TIMES DAILY PRN
Qty: 20 TABLET | Refills: 0 | Status: SHIPPED | OUTPATIENT
Start: 2019-06-29

## 2019-06-29 RX ORDER — DIAZEPAM 5 MG/1
5 TABLET ORAL
Status: COMPLETED | OUTPATIENT
Start: 2019-06-29 | End: 2019-06-29

## 2019-06-29 RX ADMIN — MECLIZINE HYDROCHLORIDE 25 MG: 25 TABLET ORAL at 05:06

## 2019-06-29 RX ADMIN — DIAZEPAM 5 MG: 5 TABLET ORAL at 05:06

## 2019-06-29 RX ADMIN — SODIUM CHLORIDE 1000 ML: 0.9 INJECTION, SOLUTION INTRAVENOUS at 07:06

## 2019-06-29 NOTE — ED PROVIDER NOTES
Encounter Date: 2019    SCRIBE #1 NOTE: I, Ralph Diehl, am scribing for, and in the presence of,  . I have scribed the entire note.       History     Chief Complaint   Patient presents with    Dizziness     Patient reports dizziness, anxiety that started this morning when she woke up. Patient states that she tried to walkand ran into the wall because of the dizziness. Denies hitting head, loc. Reprots hx of dizziness and anxiety. Denies numbness, weakness, headache;     Anxiety     This is a 79 y.o. female who presents to the ED complaining of room spinning dizziness. Sitting up exacerbates her dizziness. She reports feeling like she's going faint, HA, nausea, shortness of breath, hypertension, kidney problems. The patient denies vomiting, wheezing, cough, being diabetic, and any heart and lungs problems. Her dizziness caused her to run into walls. Her right leg is chronically swollen.    The history is provided by the patient. No  was used.     Review of patient's allergies indicates:   Allergen Reactions    Aspirin Anaphylaxis    Codeine Itching    Keflex [cephalexin] Itching    Moxifloxacin Itching    Penicillins Hives     Past Medical History:   Diagnosis Date    Chronic kidney disease     stage 3     Hypertension     Thyroid disease      Past Surgical History:   Procedure Laterality Date    ABDOMINAL SURGERY      removal scar tissue abdomen    BACK SURGERY      x3    CHOLECYSTECTOMY      HYSTERECTOMY      JOINT REPLACEMENT      Rt total knee    REVISION OF SCAR TISSUE RECTUS MUSCLE      after gall bladder surgery    REVISION-ARTHROPLASTY-KNEE-TOTAL Right 2017    Performed by Sav Green MD at [facility] OR     History reviewed. No pertinent family history.  Social History     Tobacco Use    Smoking status: Former Smoker     Last attempt to quit:      Years since quittin.5    Smokeless tobacco: Never Used   Substance Use Topics    Alcohol  use: No    Drug use: No     Review of Systems   Constitutional: Negative for fever.   HENT: Negative for sore throat.    Eyes: Negative for visual disturbance.   Respiratory: Positive for shortness of breath. Negative for cough and wheezing.    Cardiovascular: Negative for chest pain.   Gastrointestinal: Positive for nausea. Negative for abdominal pain.   Genitourinary: Negative for dysuria.   Musculoskeletal: Negative for back pain.   Skin: Negative for rash.   Neurological: Negative for headaches.       Physical Exam     Initial Vitals [06/29/19 1533]   BP Pulse Resp Temp SpO2   (!) 164/106 72 18 98.1 °F (36.7 °C) 98 %      MAP       --         Physical Exam    Nursing note and vitals reviewed.  Constitutional: She appears well-developed and well-nourished.   HENT:   Head: Normocephalic and atraumatic.   Eyes: EOM are normal. Pupils are equal, round, and reactive to light.   Neck: Neck supple. No thyromegaly present. No JVD present.   Cardiovascular: Normal rate and regular rhythm. Exam reveals no gallop and no friction rub.    No murmur heard.  Pulmonary/Chest: Breath sounds normal. No respiratory distress. She has no wheezes.   Abdominal: Soft. Bowel sounds are normal. There is no tenderness.   Musculoskeletal: Normal range of motion. She exhibits no edema or tenderness.   Neurological: She is alert and oriented to person, place, and time. She has normal strength. GCS score is 15. GCS eye subscore is 4. GCS verbal subscore is 5. GCS motor subscore is 6.   Skin: Skin is warm and dry. Capillary refill takes less than 2 seconds.         ED Course   Procedures  Labs Reviewed   CBC W/ AUTO DIFFERENTIAL - Abnormal; Notable for the following components:       Result Value    Mean Corpuscular Hemoglobin 31.2 (*)     All other components within normal limits   COMPREHENSIVE METABOLIC PANEL - Abnormal; Notable for the following components:    CO2 21 (*)     Calcium 11.2 (*)     eGFR if  45 (*)     eGFR if  non  39 (*)     All other components within normal limits     EKG Readings: (Independently Interpreted)   Initial Reading: No STEMI. Rhythm: Normal Sinus Rhythm. Heart Rate: 68. Ectopy: No Ectopy. Conduction: Normal. ST Segments: Normal ST Segments. T Waves: Normal. Clinical Impression: Normal Sinus Rhythm     ECG Results          EKG 12-lead (Final result)  Result time 06/30/19 13:15:57    Final result by Interface, Lab In [facility] (06/30/19 13:15:57)                 Narrative:    Test Reason : R06.02,    Vent. Rate : 074 BPM     Atrial Rate : 074 BPM     P-R Int : 170 ms          QRS Dur : 080 ms      QT Int : 402 ms       P-R-T Axes : 028 047 061 degrees     QTc Int : 446 ms    Normal sinus rhythm  Normal ECG  When compared with ECG of 29-JUN-2019 15:45,  No significant change was found  Confirmed by Jules Batres MD ([id number]) on 6/30/2019 1:15:49 PM    Referred By: [email or web address]   SELF           Confirmed By:Jules Batres MD                             EKG 12-lead (Final result)  Result time 06/30/19 13:15:53    Final result by Interface, Lab In [facility] (06/30/19 13:15:53)                 Narrative:    Test Reason : R07.9    Vent. Rate : 068 BPM     Atrial Rate : 068 BPM     P-R Int : 154 ms          QRS Dur : 086 ms      QT Int : 392 ms       P-R-T Axes : 024 049 050 degrees     QTc Int : 416 ms    Normal sinus rhythm  Normal ECG  When compared with ECG of 05-JAN-2017 10:12,  No significant change was found  Confirmed by Jules Batres MD ([id number]) on 6/30/2019 1:15:43 PM    Referred By: [email or web address]   SELF           Confirmed By:Jules Batres MD                             EKG 12-LEAD (Final result)  Result time 07/12/19 08:30:11    Final result by Unknown User (07/12/19 08:30:11)                                Imaging Results          X-Ray Chest 1 View (Final result)  Result time 06/29/19 19:05:50    Final result by Caesar Laura MD (06/29/19 19:05:50)                 Impression:      1. No acute  cardiopulmonary process, hypoventilatory exam.      Electronically signed by: Caesar Laura MD  Date:    06/29/2019  Time:    19:05             Narrative:    EXAMINATION:  XR CHEST 1 VIEW    CLINICAL HISTORY:  Shortness of breath    TECHNIQUE:  Single frontal view of the chest was performed.    COMPARISON:  04/16/2018    FINDINGS:  The cardiomediastinal silhouette is not enlarged.  There is elevation of the right hemidiaphragm..  There is no pleural effusion.  The trachea is midline.  The lungs are symmetrically expanded bilaterally with minimally coarse central hilar interstitial attenuation.  No large focal consolidation seen.  There is no pneumothorax.  The osseous structures are remarkable for degenerative changes..  Surgical changes noted of the right upper quadrant.                               CT Head Without Contrast (Final result)  Result time 06/29/19 18:23:51    Final result by Jose Sandy MD (06/29/19 18:23:51)                 Impression:      No acute intracranial process.  Additional evaluation, as clinically warranted.    Changes of chronic small vessel ischemic disease.      Electronically signed by: Jose Sandy MD  Date:    06/29/2019  Time:    18:23             Narrative:    EXAMINATION:  CT HEAD WITHOUT CONTRAST    CLINICAL HISTORY:  Dizziness;    TECHNIQUE:  Low dose axial images were obtained through the head.  Coronal and sagittal reformations were also performed. Contrast was not administered.    COMPARISON:  No comparison is available.    FINDINGS:  The subcutaneous tissues are unremarkable.  The bony calvarium is intact.  The paranasal sinuses are unremarkable.  The mastoid air cells are clear.  There are postoperative changes in the orbits.    The craniocervical junction is within normal limits.  There are no extra-axial fluid collections.  There is no evidence of intracranial hemorrhage.  The ventricles and sulci are prominent, consistent with cerebral volume loss.  There are  scattered hypodense foci within the periventricular and subcortical white matter.  The gray-white differentiation is maintained.  There is no evidence of mass effect.                              X-Rays:   Independently Interpreted Readings:   Chest X-Ray: Normal heart size.  No infiltrates.  No acute abnormalities.   Head CT: No hemorrhage.  No skull fracture.  No acute stroke.     Medical Decision Making:   History:   Old Medical Records: I decided to obtain old medical records.  Initial Assessment:   This is a 79 y.o. female who presents to the ED complaining of room spinning dizziness. Sitting up exacerbates her dizziness. She reports feeling like she's going faint, HA, nausea, shortness of breath, hypertension, kidney problems. The patient denies vomiting, wheezing, cough, being diabetic, and any heart and lungs problems  Differential Diagnosis:   Differential diagnosis includes but is not limited to:   Benign positional vertigo  anxiety   stroke   hyperventillation syndrome  ED Management:  After taking into careful account the historical factors and physical exam findings of the patient's presentation today, in conjunction with the empirical and objective data obtained on ED workup, no acute emergent medical condition requiring admission has been identified. The patient appears to be low risk for an emergent medical condition and I feel it is safe and appropriate at this time for the patient to be discharged to follow-up as detailed in their discharge instructions for reevaluation and possible continued outpatient workup and management. I have discussed the specifics of the workup with the patient and the patient has verbalized understanding of the details of the workup, the diagnosis, the treatment plan, and the need for outpatient follow-up.  Although the patient has no emergent etiology today this does not preclude the development of an emergent condition so in addition, I have advised the patient that  they can return to the ED and/or activate EMS at any time with worsening of their symptoms, change of their symptoms, or with any other medical complaint.  The patient remained comfortable and stable during their visit in the ED.  Discharge and follow-up instructions discussed with the patient who expressed understanding and willingness to comply with my recommendations.     This medical record was prepared using voice dictation software. There may be phonetic errors.    Marked improvement with therapy provided. Dizziness controlled at this time. Will treat for vertigo and stable for discharge with neurology or ENT follow up. No other focal neurologic deficits.           Scribe Attestation:   Scribe #1: I performed the above scribed service and the documentation accurately describes the services I performed. I attest to the accuracy of the note.               Clinical Impression:       ICD-10-CM ICD-9-CM   1. Dizziness R42 780.4   2. SOB (shortness of breath) R06.02 786.05   3. Anxiety F41.9 300.00                           Scribe attestation: I, Edmundo Wing, personally performed the services described in this documentation. All medical record entries made by the scribe were at my direction and in my presence. I have reviewed the chart and agree that the record reflects my personal performance and is accurate and complete         Edmundo Wing MD  07/19/19 [id number]

## 2019-06-29 NOTE — ED TRIAGE NOTES
"Pt arrived to ED with c/o dizziness and anxiety x this morning. Pt states she was walking and ran into the wall. Pt states "I tried to just put my socks on and I am dizzy." Pt appears tearful. NAD.   "

## 2019-06-30 NOTE — ED NOTES
Report by HIEU Copeland. Patient in bed stating that she is feeling better. Denies any pain or discomfort. Oxygen turned off to observe RA sat. Oriented x 4. VSS.

## 2019-07-24 ENCOUNTER — HOSPITAL ENCOUNTER (INPATIENT)
Facility: HOSPITAL | Age: 79
LOS: 2 days | Discharge: HOME | DRG: 247 | End: 2019-07-26
Attending: EMERGENCY MEDICINE | Admitting: EMERGENCY MEDICINE
Payer: MEDICARE

## 2019-07-24 DIAGNOSIS — I21.4 NSTEMI (NON-ST ELEVATED MYOCARDIAL INFARCTION): ICD-10-CM

## 2019-07-24 DIAGNOSIS — R07.9 CHEST PAIN: ICD-10-CM

## 2019-07-24 PROBLEM — I10 ESSENTIAL HYPERTENSION: Status: ACTIVE | Noted: 2019-07-24

## 2019-07-24 LAB
ALBUMIN SERPL BCP-MCNC: 3.8 G/DL (ref 3.5–5.2)
ALP SERPL-CCNC: 79 U/L (ref 55–135)
ALT SERPL W/O P-5'-P-CCNC: 17 U/L (ref 10–44)
ANION GAP SERPL CALC-SCNC: 10 MMOL/L (ref 8–16)
AST SERPL-CCNC: 21 U/L (ref 10–40)
BASOPHILS # BLD AUTO: 0.01 K/UL (ref 0–0.2)
BASOPHILS NFR BLD: 0.1 % (ref 0–1.9)
BILIRUB SERPL-MCNC: 0.5 MG/DL (ref 0.1–1)
BNP SERPL-MCNC: 80 PG/ML (ref 0–99)
BUN SERPL-MCNC: 22 MG/DL (ref 8–23)
CALCIUM SERPL-MCNC: 10.6 MG/DL (ref 8.7–10.5)
CHLORIDE SERPL-SCNC: 104 MMOL/L (ref 95–110)
CO2 SERPL-SCNC: 27 MMOL/L (ref 23–29)
CREAT SERPL-MCNC: 1.5 MG/DL (ref 0.5–1.4)
DIFFERENTIAL METHOD: NORMAL
EOSINOPHIL # BLD AUTO: 0.2 K/UL (ref 0–0.5)
EOSINOPHIL NFR BLD: 1.9 % (ref 0–8)
ERYTHROCYTE [DISTWIDTH] IN BLOOD BY AUTOMATED COUNT: 13.6 % (ref 11.5–14.5)
EST. GFR  (AFRICAN AMERICAN): 38 ML/MIN/1.73 M^2
EST. GFR  (NON AFRICAN AMERICAN): 33 ML/MIN/1.73 M^2
GLUCOSE SERPL-MCNC: 220 MG/DL (ref 70–110)
HCT VFR BLD AUTO: 41.2 % (ref 37–48.5)
HGB BLD-MCNC: 13.2 G/DL (ref 12–16)
LYMPHOCYTES # BLD AUTO: 2 K/UL (ref 1–4.8)
LYMPHOCYTES NFR BLD: 26 % (ref 18–48)
MCH RBC QN AUTO: 30.6 PG (ref 27–31)
MCHC RBC AUTO-ENTMCNC: 32 G/DL (ref 32–36)
MCV RBC AUTO: 96 FL (ref 82–98)
MONOCYTES # BLD AUTO: 0.5 K/UL (ref 0.3–1)
MONOCYTES NFR BLD: 6.2 % (ref 4–15)
NEUTROPHILS # BLD AUTO: 5.1 K/UL (ref 1.8–7.7)
NEUTROPHILS NFR BLD: 65.9 % (ref 38–73)
PLATELET # BLD AUTO: 274 K/UL (ref 150–350)
PMV BLD AUTO: 11.1 FL (ref 9.2–12.9)
POTASSIUM SERPL-SCNC: 3.7 MMOL/L (ref 3.5–5.1)
PROT SERPL-MCNC: 7.5 G/DL (ref 6–8.4)
RBC # BLD AUTO: 4.31 M/UL (ref 4–5.4)
SODIUM SERPL-SCNC: 141 MMOL/L (ref 136–145)
TROPONIN I SERPL DL<=0.01 NG/ML-MCNC: 0.41 NG/ML (ref 0–0.03)
TROPONIN I SERPL DL<=0.01 NG/ML-MCNC: 0.42 NG/ML (ref 0–0.03)
WBC # BLD AUTO: 7.76 K/UL (ref 3.9–12.7)

## 2019-07-24 PROCEDURE — 83880 ASSAY OF NATRIURETIC PEPTIDE: CPT

## 2019-07-24 PROCEDURE — 99285 EMERGENCY DEPT VISIT HI MDM: CPT

## 2019-07-24 PROCEDURE — 93010 ELECTROCARDIOGRAM REPORT: CPT | Mod: ,,, | Performed by: INTERNAL MEDICINE

## 2019-07-24 PROCEDURE — 25000003 PHARM REV CODE 250: Performed by: EMERGENCY MEDICINE

## 2019-07-24 PROCEDURE — 80053 COMPREHEN METABOLIC PANEL: CPT

## 2019-07-24 PROCEDURE — 93010 EKG 12-LEAD: ICD-10-PCS | Mod: ,,, | Performed by: INTERNAL MEDICINE

## 2019-07-24 PROCEDURE — 63600175 PHARM REV CODE 636 W HCPCS: Performed by: EMERGENCY MEDICINE

## 2019-07-24 PROCEDURE — 86850 RBC ANTIBODY SCREEN: CPT

## 2019-07-24 PROCEDURE — 12000002 HC ACUTE/MED SURGE SEMI-PRIVATE ROOM

## 2019-07-24 PROCEDURE — 84484 ASSAY OF TROPONIN QUANT: CPT | Mod: 91

## 2019-07-24 PROCEDURE — 93005 ELECTROCARDIOGRAM TRACING: CPT

## 2019-07-24 PROCEDURE — 85025 COMPLETE CBC W/AUTO DIFF WBC: CPT

## 2019-07-24 RX ORDER — ENOXAPARIN SODIUM 100 MG/ML
1 INJECTION SUBCUTANEOUS
Status: DISCONTINUED | OUTPATIENT
Start: 2019-07-24 | End: 2019-07-25

## 2019-07-24 RX ORDER — METOPROLOL TARTRATE 25 MG/1
12.5 TABLET ORAL 2 TIMES DAILY
Status: DISCONTINUED | OUTPATIENT
Start: 2019-07-24 | End: 2019-07-26 | Stop reason: HOSPADM

## 2019-07-24 RX ORDER — CLOPIDOGREL 300 MG/1
300 TABLET, FILM COATED ORAL
Status: DISCONTINUED | OUTPATIENT
Start: 2019-07-24 | End: 2019-07-24

## 2019-07-24 RX ORDER — CLOPIDOGREL BISULFATE 75 MG/1
225 TABLET ORAL
Status: COMPLETED | OUTPATIENT
Start: 2019-07-24 | End: 2019-07-24

## 2019-07-24 RX ORDER — CLOPIDOGREL BISULFATE 75 MG/1
75 TABLET ORAL
Status: COMPLETED | OUTPATIENT
Start: 2019-07-24 | End: 2019-07-24

## 2019-07-24 RX ADMIN — ENOXAPARIN SODIUM 100 MG: 100 INJECTION SUBCUTANEOUS at 10:07

## 2019-07-24 RX ADMIN — CLOPIDOGREL BISULFATE 75 MG: 75 TABLET ORAL at 09:07

## 2019-07-24 RX ADMIN — METOPROLOL TARTRATE 12.5 MG: 25 TABLET, FILM COATED ORAL at 10:07

## 2019-07-24 RX ADMIN — CLOPIDOGREL BISULFATE 225 MG: 75 TABLET ORAL at 09:07

## 2019-07-24 NOTE — Clinical Note
80 ml injected throughout the case. 60 mL total wasted during the case. 140 mL total used in the case.

## 2019-07-24 NOTE — Clinical Note
Catheter is repositioned to the ostium   left main. Angiography performed of the left coronary arteries in multiple views.

## 2019-07-25 PROBLEM — Z88.8 ASPIRIN ALLERGY: Status: ACTIVE | Noted: 2019-07-25

## 2019-07-25 LAB
ABO + RH BLD: NORMAL
ANION GAP SERPL CALC-SCNC: 10 MMOL/L (ref 8–16)
AORTIC ROOT ANNULUS: 3.25 CM
AORTIC VALVE CUSP SEPERATION: 2.1 CM
ASCENDING AORTA: 2.7 CM
AV INDEX (PROSTH): 0.72
AV MEAN GRADIENT: 7 MMHG
AV PEAK GRADIENT: 11 MMHG
AV VALVE AREA: 2.22 CM2
AV VELOCITY RATIO: 0.72
BASOPHILS # BLD AUTO: 0.03 K/UL (ref 0–0.2)
BASOPHILS NFR BLD: 0.3 % (ref 0–1.9)
BLD GP AB SCN CELLS X3 SERPL QL: NORMAL
BSA FOR ECHO PROCEDURE: 2.07 M2
BUN SERPL-MCNC: 21 MG/DL (ref 8–23)
CALCIUM SERPL-MCNC: 10.8 MG/DL (ref 8.7–10.5)
CHLORIDE SERPL-SCNC: 102 MMOL/L (ref 95–110)
CHOLEST SERPL-MCNC: 178 MG/DL (ref 120–199)
CHOLEST/HDLC SERPL: 3.3 {RATIO} (ref 2–5)
CO2 SERPL-SCNC: 29 MMOL/L (ref 23–29)
CREAT SERPL-MCNC: 1.4 MG/DL (ref 0.5–1.4)
CV ECHO LV RWT: 0.4 CM
DIFFERENTIAL METHOD: ABNORMAL
DOP CALC AO PEAK VEL: 1.67 M/S
DOP CALC AO VTI: 30.95 CM
DOP CALC LVOT AREA: 3.1 CM2
DOP CALC LVOT DIAMETER: 1.98 CM
DOP CALC LVOT PEAK VEL: 1.21 M/S
DOP CALC LVOT STROKE VOLUME: 68.66 CM3
DOP CALCLVOT PEAK VEL VTI: 22.31 CM
E WAVE DECELERATION TIME: 286.48 MSEC
E/A RATIO: 0.82
ECHO LV POSTERIOR WALL: 0.95 CM (ref 0.6–1.1)
EOSINOPHIL # BLD AUTO: 0.2 K/UL (ref 0–0.5)
EOSINOPHIL NFR BLD: 1.7 % (ref 0–8)
ERYTHROCYTE [DISTWIDTH] IN BLOOD BY AUTOMATED COUNT: 13.5 % (ref 11.5–14.5)
EST. GFR  (AFRICAN AMERICAN): 41 ML/MIN/1.73 M^2
EST. GFR  (NON AFRICAN AMERICAN): 36 ML/MIN/1.73 M^2
FRACTIONAL SHORTENING: 47 % (ref 28–44)
GLUCOSE SERPL-MCNC: 111 MG/DL (ref 70–110)
HCT VFR BLD AUTO: 41.6 % (ref 37–48.5)
HDLC SERPL-MCNC: 54 MG/DL (ref 40–75)
HDLC SERPL: 30.3 % (ref 20–50)
HGB BLD-MCNC: 13.2 G/DL (ref 12–16)
INTERVENTRICULAR SEPTUM: 0.89 CM (ref 0.6–1.1)
IVRT: 0.09 MSEC
LA MAJOR: 3.74 CM
LA MINOR: 5.07 CM
LA WIDTH: 3.84 CM
LDLC SERPL CALC-MCNC: 109.8 MG/DL (ref 63–159)
LEFT ATRIUM SIZE: 3.88 CM
LEFT ATRIUM VOLUME INDEX: 27.4 ML/M2
LEFT ATRIUM VOLUME: 54.51 CM3
LEFT INTERNAL DIMENSION IN SYSTOLE: 2.51 CM (ref 2.1–4)
LEFT VENTRICLE DIASTOLIC VOLUME INDEX: 52.02 ML/M2
LEFT VENTRICLE DIASTOLIC VOLUME: 103.68 ML
LEFT VENTRICLE MASS INDEX: 75 G/M2
LEFT VENTRICLE SYSTOLIC VOLUME INDEX: 11.3 ML/M2
LEFT VENTRICLE SYSTOLIC VOLUME: 22.58 ML
LEFT VENTRICULAR INTERNAL DIMENSION IN DIASTOLE: 4.73 CM (ref 3.5–6)
LEFT VENTRICULAR MASS: 148.5 G
LYMPHOCYTES # BLD AUTO: 3.2 K/UL (ref 1–4.8)
LYMPHOCYTES NFR BLD: 36 % (ref 18–48)
MCH RBC QN AUTO: 30.4 PG (ref 27–31)
MCHC RBC AUTO-ENTMCNC: 31.7 G/DL (ref 32–36)
MCV RBC AUTO: 96 FL (ref 82–98)
MONOCYTES # BLD AUTO: 0.7 K/UL (ref 0.3–1)
MONOCYTES NFR BLD: 7.5 % (ref 4–15)
MV PEAK A VEL: 0.88 M/S
MV PEAK E VEL: 0.72 M/S
NEUTROPHILS # BLD AUTO: 4.9 K/UL (ref 1.8–7.7)
NEUTROPHILS NFR BLD: 54.8 % (ref 38–73)
NONHDLC SERPL-MCNC: 124 MG/DL
PISA TR MAX VEL: 1.75 M/S
PLATELET # BLD AUTO: 298 K/UL (ref 150–350)
PMV BLD AUTO: 11.2 FL (ref 9.2–12.9)
POCT GLUCOSE: 106 MG/DL (ref 70–110)
POTASSIUM SERPL-SCNC: 4.1 MMOL/L (ref 3.5–5.1)
PULM VEIN S/D RATIO: 1.53
PV PEAK D VEL: 0.36 M/S
PV PEAK S VEL: 0.55 M/S
PV PEAK VELOCITY: 0.92 CM/S
RA MAJOR: 3.59 CM
RA WIDTH: 2.48 CM
RBC # BLD AUTO: 4.34 M/UL (ref 4–5.4)
RIGHT VENTRICULAR END-DIASTOLIC DIMENSION: 2.77 CM
RV TISSUE DOPPLER FREE WALL SYSTOLIC VELOCITY 1 (APICAL 4 CHAMBER VIEW): 6.75 CM/S
SINUS: 2.92 CM
SODIUM SERPL-SCNC: 141 MMOL/L (ref 136–145)
STJ: 2.46 CM
TR MAX PG: 12 MMHG
TRICUSPID ANNULAR PLANE SYSTOLIC EXCURSION: 1.4 CM
TRIGL SERPL-MCNC: 71 MG/DL (ref 30–150)
TROPONIN I SERPL DL<=0.01 NG/ML-MCNC: 0.54 NG/ML (ref 0–0.03)
TSH SERPL DL<=0.005 MIU/L-ACNC: 0.48 UIU/ML (ref 0.4–4)
WBC # BLD AUTO: 8.92 K/UL (ref 3.9–12.7)

## 2019-07-25 PROCEDURE — 63600175 PHARM REV CODE 636 W HCPCS: Performed by: EMERGENCY MEDICINE

## 2019-07-25 PROCEDURE — 99223 1ST HOSP IP/OBS HIGH 75: CPT | Mod: 25,,, | Performed by: INTERNAL MEDICINE

## 2019-07-25 PROCEDURE — 99153 MOD SED SAME PHYS/QHP EA: CPT | Performed by: INTERNAL MEDICINE

## 2019-07-25 PROCEDURE — 63600175 PHARM REV CODE 636 W HCPCS: Performed by: INTERNAL MEDICINE

## 2019-07-25 PROCEDURE — 93458 PR CATH PLACE/CORON ANGIO, IMG SUPER/INTERP,W LEFT HEART VENTRICULOGRAPHY: ICD-10-PCS | Mod: 26,59,, | Performed by: INTERNAL MEDICINE

## 2019-07-25 PROCEDURE — 27201423 OPTIME MED/SURG SUP & DEVICES STERILE SUPPLY: Performed by: INTERNAL MEDICINE

## 2019-07-25 PROCEDURE — 83970 ASSAY OF PARATHORMONE: CPT

## 2019-07-25 PROCEDURE — C1725 CATH, TRANSLUMIN NON-LASER: HCPCS | Performed by: INTERNAL MEDICINE

## 2019-07-25 PROCEDURE — 80061 LIPID PANEL: CPT

## 2019-07-25 PROCEDURE — 25000003 PHARM REV CODE 250: Performed by: HOSPITALIST

## 2019-07-25 PROCEDURE — 84443 ASSAY THYROID STIM HORMONE: CPT

## 2019-07-25 PROCEDURE — 99152 MOD SED SAME PHYS/QHP 5/>YRS: CPT | Performed by: INTERNAL MEDICINE

## 2019-07-25 PROCEDURE — 25500020 PHARM REV CODE 255: Performed by: INTERNAL MEDICINE

## 2019-07-25 PROCEDURE — 25000003 PHARM REV CODE 250: Performed by: INTERNAL MEDICINE

## 2019-07-25 PROCEDURE — 93458 L HRT ARTERY/VENTRICLE ANGIO: CPT | Mod: 26,59,, | Performed by: INTERNAL MEDICINE

## 2019-07-25 PROCEDURE — 93010 ELECTROCARDIOGRAM REPORT: CPT | Mod: 59,,, | Performed by: INTERNAL MEDICINE

## 2019-07-25 PROCEDURE — C1874 STENT, COATED/COV W/DEL SYS: HCPCS | Performed by: INTERNAL MEDICINE

## 2019-07-25 PROCEDURE — 93458 L HRT ARTERY/VENTRICLE ANGIO: CPT | Mod: 59 | Performed by: INTERNAL MEDICINE

## 2019-07-25 PROCEDURE — S0028 INJECTION, FAMOTIDINE, 20 MG: HCPCS | Performed by: INTERNAL MEDICINE

## 2019-07-25 PROCEDURE — C1769 GUIDE WIRE: HCPCS | Performed by: INTERNAL MEDICINE

## 2019-07-25 PROCEDURE — 93010 EKG 12-LEAD: ICD-10-PCS | Mod: 59,,, | Performed by: INTERNAL MEDICINE

## 2019-07-25 PROCEDURE — 85025 COMPLETE CBC W/AUTO DIFF WBC: CPT

## 2019-07-25 PROCEDURE — 80048 BASIC METABOLIC PNL TOTAL CA: CPT

## 2019-07-25 PROCEDURE — 36415 COLL VENOUS BLD VENIPUNCTURE: CPT

## 2019-07-25 PROCEDURE — 84484 ASSAY OF TROPONIN QUANT: CPT

## 2019-07-25 PROCEDURE — 92928 PRQ TCAT PLMT NTRAC ST 1 LES: CPT | Mod: LD,,, | Performed by: INTERNAL MEDICINE

## 2019-07-25 PROCEDURE — 93005 ELECTROCARDIOGRAM TRACING: CPT

## 2019-07-25 PROCEDURE — C9600 PERC DRUG-EL COR STENT SING: HCPCS | Mod: LD | Performed by: INTERNAL MEDICINE

## 2019-07-25 PROCEDURE — 92928 PR STENT: ICD-10-PCS | Mod: LD,,, | Performed by: INTERNAL MEDICINE

## 2019-07-25 PROCEDURE — C1894 INTRO/SHEATH, NON-LASER: HCPCS | Performed by: INTERNAL MEDICINE

## 2019-07-25 PROCEDURE — 20000000 HC ICU ROOM

## 2019-07-25 PROCEDURE — 99223 PR INITIAL HOSPITAL CARE,LEVL III: ICD-10-PCS | Mod: 25,,, | Performed by: INTERNAL MEDICINE

## 2019-07-25 PROCEDURE — C1887 CATHETER, GUIDING: HCPCS | Performed by: INTERNAL MEDICINE

## 2019-07-25 PROCEDURE — 63600175 PHARM REV CODE 636 W HCPCS: Mod: JG | Performed by: INTERNAL MEDICINE

## 2019-07-25 DEVICE — STENT RONYX27526UX RESOLUTE ONYX 2.75X26
Type: IMPLANTABLE DEVICE | Site: HEART | Status: FUNCTIONAL
Brand: RESOLUTE ONYX™

## 2019-07-25 RX ORDER — SERTRALINE HYDROCHLORIDE 50 MG/1
100 TABLET, FILM COATED ORAL DAILY
Status: DISCONTINUED | OUTPATIENT
Start: 2019-07-25 | End: 2019-07-26 | Stop reason: HOSPADM

## 2019-07-25 RX ORDER — TIROFIBAN HYDROCHLORIDE 50 UG/ML
INJECTION INTRAVENOUS
Status: DISCONTINUED | OUTPATIENT
Start: 2019-07-25 | End: 2019-07-25

## 2019-07-25 RX ORDER — ACETAMINOPHEN 325 MG/1
650 TABLET ORAL EVERY 6 HOURS PRN
Status: DISCONTINUED | OUTPATIENT
Start: 2019-07-25 | End: 2019-07-26 | Stop reason: HOSPADM

## 2019-07-25 RX ORDER — MIDAZOLAM HYDROCHLORIDE 1 MG/ML
INJECTION, SOLUTION INTRAMUSCULAR; INTRAVENOUS
Status: DISCONTINUED | OUTPATIENT
Start: 2019-07-25 | End: 2019-07-25 | Stop reason: HOSPADM

## 2019-07-25 RX ORDER — TIROFIBAN HYDROCHLORIDE 50 UG/ML
25 INJECTION INTRAVENOUS ONCE
Status: DISCONTINUED | OUTPATIENT
Start: 2019-07-25 | End: 2019-07-26 | Stop reason: HOSPADM

## 2019-07-25 RX ORDER — TIROFIBAN HYDROCHLORIDE 50 UG/ML
0.07 INJECTION INTRAVENOUS CONTINUOUS
Status: ACTIVE | OUTPATIENT
Start: 2019-07-25 | End: 2019-07-25

## 2019-07-25 RX ORDER — LISINOPRIL 5 MG/1
5 TABLET ORAL DAILY
Status: DISCONTINUED | OUTPATIENT
Start: 2019-07-25 | End: 2019-07-26 | Stop reason: HOSPADM

## 2019-07-25 RX ORDER — AMOXICILLIN 250 MG
1 CAPSULE ORAL 2 TIMES DAILY
Status: DISCONTINUED | OUTPATIENT
Start: 2019-07-25 | End: 2019-07-26 | Stop reason: HOSPADM

## 2019-07-25 RX ORDER — HEPARIN SODIUM 200 [USP'U]/100ML
INJECTION, SOLUTION INTRAVENOUS
Status: DISCONTINUED | OUTPATIENT
Start: 2019-07-25 | End: 2019-07-25

## 2019-07-25 RX ORDER — ONDANSETRON 2 MG/ML
4 INJECTION INTRAMUSCULAR; INTRAVENOUS EVERY 8 HOURS PRN
Status: DISCONTINUED | OUTPATIENT
Start: 2019-07-25 | End: 2019-07-26 | Stop reason: HOSPADM

## 2019-07-25 RX ORDER — MORPHINE SULFATE 10 MG/ML
5 INJECTION INTRAMUSCULAR; INTRAVENOUS; SUBCUTANEOUS EVERY 4 HOURS PRN
Status: DISCONTINUED | OUTPATIENT
Start: 2019-07-25 | End: 2019-07-26 | Stop reason: HOSPADM

## 2019-07-25 RX ORDER — FENTANYL CITRATE 50 UG/ML
INJECTION, SOLUTION INTRAMUSCULAR; INTRAVENOUS
Status: DISCONTINUED | OUTPATIENT
Start: 2019-07-25 | End: 2019-07-25 | Stop reason: HOSPADM

## 2019-07-25 RX ORDER — CLOPIDOGREL BISULFATE 75 MG/1
75 TABLET ORAL DAILY
Status: DISCONTINUED | OUTPATIENT
Start: 2019-07-25 | End: 2019-07-26 | Stop reason: HOSPADM

## 2019-07-25 RX ORDER — SODIUM CHLORIDE 9 MG/ML
INJECTION, SOLUTION INTRAVENOUS CONTINUOUS
Status: DISCONTINUED | OUTPATIENT
Start: 2019-07-25 | End: 2019-07-25

## 2019-07-25 RX ORDER — NITROGLYCERIN 20 MG/100ML
INJECTION INTRAVENOUS
Status: DISCONTINUED | OUTPATIENT
Start: 2019-07-25 | End: 2019-07-25

## 2019-07-25 RX ORDER — ASPIRIN 325 MG
30 TABLET ORAL ONCE
Status: COMPLETED | OUTPATIENT
Start: 2019-07-25 | End: 2019-07-25

## 2019-07-25 RX ORDER — ASPIRIN 325 MG
0.3 TABLET ORAL ONCE
Status: COMPLETED | OUTPATIENT
Start: 2019-07-25 | End: 2019-07-25

## 2019-07-25 RX ORDER — ASPIRIN 325 MG
40 TABLET ORAL ONCE
Status: COMPLETED | OUTPATIENT
Start: 2019-07-25 | End: 2019-07-25

## 2019-07-25 RX ORDER — SODIUM CHLORIDE 0.9 % (FLUSH) 0.9 %
10 SYRINGE (ML) INJECTION
Status: DISCONTINUED | OUTPATIENT
Start: 2019-07-25 | End: 2019-07-26 | Stop reason: HOSPADM

## 2019-07-25 RX ORDER — HEPARIN SODIUM 1000 [USP'U]/ML
INJECTION, SOLUTION INTRAVENOUS; SUBCUTANEOUS
Status: DISCONTINUED | OUTPATIENT
Start: 2019-07-25 | End: 2019-07-25 | Stop reason: HOSPADM

## 2019-07-25 RX ORDER — LEVOTHYROXINE SODIUM 100 UG/1
100 TABLET ORAL DAILY
Status: DISCONTINUED | OUTPATIENT
Start: 2019-07-25 | End: 2019-07-26 | Stop reason: HOSPADM

## 2019-07-25 RX ORDER — ATORVASTATIN CALCIUM 40 MG/1
80 TABLET, FILM COATED ORAL DAILY
Status: DISCONTINUED | OUTPATIENT
Start: 2019-07-25 | End: 2019-07-26 | Stop reason: HOSPADM

## 2019-07-25 RX ORDER — ASPIRIN 325 MG
10 TABLET ORAL ONCE
Status: COMPLETED | OUTPATIENT
Start: 2019-07-25 | End: 2019-07-25

## 2019-07-25 RX ORDER — NAPROXEN SODIUM 220 MG/1
81 TABLET, FILM COATED ORAL ONCE
Status: COMPLETED | OUTPATIENT
Start: 2019-07-25 | End: 2019-07-25

## 2019-07-25 RX ORDER — DIPHENHYDRAMINE HCL 50 MG
50 CAPSULE ORAL ONCE
Status: COMPLETED | OUTPATIENT
Start: 2019-07-25 | End: 2019-07-25

## 2019-07-25 RX ORDER — VERAPAMIL HYDROCHLORIDE 2.5 MG/ML
INJECTION, SOLUTION INTRAVENOUS
Status: DISCONTINUED | OUTPATIENT
Start: 2019-07-25 | End: 2019-07-25 | Stop reason: HOSPADM

## 2019-07-25 RX ORDER — ONDANSETRON 2 MG/ML
4 INJECTION INTRAMUSCULAR; INTRAVENOUS EVERY 12 HOURS PRN
Status: DISCONTINUED | OUTPATIENT
Start: 2019-07-25 | End: 2019-07-26 | Stop reason: HOSPADM

## 2019-07-25 RX ORDER — ATROPINE SULFATE 0.1 MG/ML
0.5 INJECTION INTRAVENOUS
Status: DISCONTINUED | OUTPATIENT
Start: 2019-07-25 | End: 2019-07-26 | Stop reason: HOSPADM

## 2019-07-25 RX ORDER — FAMOTIDINE 10 MG/ML
INJECTION INTRAVENOUS
Status: DISCONTINUED | OUTPATIENT
Start: 2019-07-25 | End: 2019-07-25 | Stop reason: HOSPADM

## 2019-07-25 RX ORDER — NITROGLYCERIN 0.4 MG/1
0.4 TABLET SUBLINGUAL EVERY 5 MIN PRN
Status: COMPLETED | OUTPATIENT
Start: 2019-07-25 | End: 2019-07-25

## 2019-07-25 RX ORDER — MORPHINE SULFATE 10 MG/ML
2 INJECTION INTRAMUSCULAR; INTRAVENOUS; SUBCUTANEOUS
Status: DISCONTINUED | OUTPATIENT
Start: 2019-07-25 | End: 2019-07-25

## 2019-07-25 RX ORDER — ACETAMINOPHEN 325 MG/1
650 TABLET ORAL EVERY 4 HOURS PRN
Status: DISCONTINUED | OUTPATIENT
Start: 2019-07-25 | End: 2019-07-26 | Stop reason: HOSPADM

## 2019-07-25 RX ORDER — ASPIRIN 325 MG
0.1 TABLET ORAL ONCE
Status: COMPLETED | OUTPATIENT
Start: 2019-07-25 | End: 2019-07-25

## 2019-07-25 RX ORDER — MORPHINE SULFATE 10 MG/ML
2 INJECTION INTRAMUSCULAR; INTRAVENOUS; SUBCUTANEOUS EVERY 10 MIN PRN
Status: DISCONTINUED | OUTPATIENT
Start: 2019-07-25 | End: 2019-07-26 | Stop reason: HOSPADM

## 2019-07-25 RX ORDER — LIDOCAINE HYDROCHLORIDE 10 MG/ML
INJECTION, SOLUTION EPIDURAL; INFILTRATION; INTRACAUDAL; PERINEURAL
Status: DISCONTINUED | OUTPATIENT
Start: 2019-07-25 | End: 2019-07-25 | Stop reason: HOSPADM

## 2019-07-25 RX ORDER — OXYCODONE AND ACETAMINOPHEN 5; 325 MG/1; MG/1
1 TABLET ORAL EVERY 6 HOURS PRN
Status: DISCONTINUED | OUTPATIENT
Start: 2019-07-25 | End: 2019-07-26 | Stop reason: HOSPADM

## 2019-07-25 RX ORDER — NAPROXEN SODIUM 220 MG/1
162 TABLET, FILM COATED ORAL ONCE
Status: COMPLETED | OUTPATIENT
Start: 2019-07-25 | End: 2019-07-25

## 2019-07-25 RX ORDER — NITROGLYCERIN 0.4 MG/1
0.4 TABLET SUBLINGUAL EVERY 5 MIN PRN
Status: DISCONTINUED | OUTPATIENT
Start: 2019-07-25 | End: 2019-07-26 | Stop reason: HOSPADM

## 2019-07-25 RX ADMIN — Medication 0.3 MG: at 01:07

## 2019-07-25 RX ADMIN — Medication 30 MG: at 02:07

## 2019-07-25 RX ADMIN — SODIUM CHLORIDE: 0.9 INJECTION, SOLUTION INTRAVENOUS at 07:07

## 2019-07-25 RX ADMIN — ATORVASTATIN CALCIUM 80 MG: 40 TABLET, FILM COATED ORAL at 08:07

## 2019-07-25 RX ADMIN — MORPHINE SULFATE 2 MG: 10 INJECTION INTRAVENOUS at 02:07

## 2019-07-25 RX ADMIN — Medication 0.1 MG: at 01:07

## 2019-07-25 RX ADMIN — NITROGLYCERIN 0.4 MG: 0.4 TABLET, ORALLY DISINTEGRATING SUBLINGUAL at 03:07

## 2019-07-25 RX ADMIN — METOPROLOL TARTRATE 12.5 MG: 25 TABLET, FILM COATED ORAL at 08:07

## 2019-07-25 RX ADMIN — CLOPIDOGREL BISULFATE 75 MG: 75 TABLET ORAL at 08:07

## 2019-07-25 RX ADMIN — SERTRALINE HYDROCHLORIDE 100 MG: 50 TABLET ORAL at 08:07

## 2019-07-25 RX ADMIN — ASPIRIN 81 MG 81 MG: 81 TABLET ORAL at 02:07

## 2019-07-25 RX ADMIN — SODIUM CHLORIDE 1600 ML: 9 INJECTION, SOLUTION INTRAVENOUS at 10:07

## 2019-07-25 RX ADMIN — NITROGLYCERIN 2 INCH: 20 OINTMENT TOPICAL at 05:07

## 2019-07-25 RX ADMIN — LISINOPRIL 5 MG: 5 TABLET ORAL at 08:07

## 2019-07-25 RX ADMIN — ASPIRIN 81 MG 162 MG: 81 TABLET ORAL at 03:07

## 2019-07-25 RX ADMIN — DIPHENHYDRAMINE HYDROCHLORIDE 50 MG: 50 CAPSULE ORAL at 10:07

## 2019-07-25 RX ADMIN — DOCUSATE SODIUM AND SENNOSIDES 1 TABLET: 8.6; 5 TABLET, FILM COATED ORAL at 08:07

## 2019-07-25 RX ADMIN — Medication 40 MG: at 02:07

## 2019-07-25 RX ADMIN — LEVOTHYROXINE SODIUM 100 MCG: 100 TABLET ORAL at 06:07

## 2019-07-25 RX ADMIN — Medication 10 MG: at 01:07

## 2019-07-25 NOTE — HOSPITAL COURSE
78 y/o female presented with chest pain.  In the emergency department routine laboratory studies, chest x-ray, EKG, cardiac enzymes were obtained.  Elevated troponin level.  ACS protocol initiated.  Being treated for non ST elevation MI.  Cardiology consulted.  Patient was taken to Cath lab on 7/25. Note that patient has serious ASA allergy with a history of anaphylaxis.  Patient underwent LHC showing 2 vessel CAD.  She underwent successful PCI of proximal LAD.  Patient was started on Plavix and Statin.  Home Cardizem and Maxzide have been stopped and patient has been started on ACEI and B blocker.  Patient underwent aspirin desensitization protocol during hospital stay with no complications.  She will be discharged on ASA 81 mg.  Patient has remained afebrile and hemodynamically stable.  She is currently chest pain free.  She will be discharged home to follow up with PCP and Cardiology.

## 2019-07-25 NOTE — NURSING
Report received from Clau HAGEN, ED.    0230- Pt arrived to floor by w/c x1 assist. NAD noted. Telemetry monitor applied, alarm set. Pt oriented to room. Call light within reach. Will continue to monitor.

## 2019-07-25 NOTE — SUBJECTIVE & OBJECTIVE
Past Medical History:   Diagnosis Date    Chronic kidney disease     stage 3     Hypertension     Thyroid disease        Past Surgical History:   Procedure Laterality Date    ABDOMINAL SURGERY      removal scar tissue abdomen    BACK SURGERY      x3    CHOLECYSTECTOMY      HYSTERECTOMY      JOINT REPLACEMENT      Rt total knee    REVISION OF SCAR TISSUE RECTUS MUSCLE      after gall bladder surgery    REVISION-ARTHROPLASTY-KNEE-TOTAL Right 1/9/2017    Performed by Sav Green MD at [facility] OR       Review of patient's allergies indicates:   Allergen Reactions    Aspirin Anaphylaxis    Codeine Itching    Keflex [cephalexin] Itching    Moxifloxacin Itching    Penicillins Hives       No current facility-administered medications on file prior to encounter.      Current Outpatient Medications on File Prior to Encounter   Medication Sig    ergocalciferol (VITAMIN D2) 50,000 unit Cap Take 50,000 Units by mouth every 7 days. Taking on Monday's    hydrocodone-acetaminophen 5-325mg (NORCO) 5-325 mg per tablet Take 1 tablet by mouth every 6 (six) hours as needed for Pain.    levothyroxine (SYNTHROID) 100 MCG tablet Take 100 mcg by mouth once daily.    sertraline (ZOLOFT) 100 MG tablet Take 100 mg by mouth once daily.    triamterene-hydrochlorothiazide 37.5-25 mg (MAXZIDE-25) 37.5-25 mg per tablet Take 1 tablet by mouth once daily.    diltiaZEM (CARDIZEM CD) 180 MG 24 hr capsule Take 180 mg by mouth once daily.    diphenhydrAMINE (BENADRYL) 25 mg capsule Take 1 each (25 mg total) by mouth every 6 (six) hours as needed for Itching.    furosemide (LASIX) 20 MG tablet Take 20 mg by mouth 2 (two) times daily.    meclizine (ANTIVERT) 25 mg tablet Take 1 tablet (25 mg total) by mouth 3 (three) times daily as needed for Dizziness.    oxycodone-acetaminophen (PERCOCET) 7.5-325 mg per tablet Take 1 tablet by mouth every 4 (four) hours as needed.     Family History     None        Tobacco Use    Smoking  status: Former Smoker     Last attempt to quit:      Years since quittin.5    Smokeless tobacco: Never Used   Substance and Sexual Activity    Alcohol use: No    Drug use: No    Sexual activity: Never     Partners: Male     Review of Systems   Constitution: Negative for chills, diaphoresis, fever and malaise/fatigue.   HENT: Negative for nosebleeds.    Eyes: Negative for blurred vision and double vision.   Cardiovascular: Positive for chest pain. Negative for claudication, cyanosis, dyspnea on exertion, leg swelling, orthopnea, palpitations, paroxysmal nocturnal dyspnea and syncope.   Respiratory: Negative for cough, shortness of breath and wheezing.    Skin: Negative for dry skin and poor wound healing.   Musculoskeletal: Negative for back pain, joint swelling and myalgias.   Gastrointestinal: Negative for abdominal pain, nausea and vomiting.   Genitourinary: Negative for hematuria.   Neurological: Negative for dizziness, headaches, numbness, seizures and weakness.   Psychiatric/Behavioral: Negative for altered mental status and depression.     Objective:     Vital Signs (Most Recent):  Temp: 97.7 °F (36.5 °C) (19)  Pulse: 65 (19)  Resp: 20 (19)  BP: (!) 133/90 (19)  SpO2: 97 % (19) Vital Signs (24h Range):  Temp:  [97.6 °F (36.4 °C)-98.2 °F (36.8 °C)] 97.7 °F (36.5 °C)  Pulse:  [] 65  Resp:  [17-20] 20  SpO2:  [96 %-100 %] 97 %  BP: (119-199)/(56-91) 133/90     Weight: 94.9 kg (209 lb 3.5 oz)  Body mass index is 36.48 kg/m².    SpO2: 97 %  O2 Device (Oxygen Therapy): nasal cannula    No intake or output data in the 24 hours ending 19 0700    Lines/Drains/Airways     Peripheral Intravenous Line                 Peripheral IV - Single Lumen 19 2130 20 G Left Forearm less than 1 day                Physical Exam   Constitutional: She is oriented to person, place, and time. She appears well-developed and well-nourished. No distress.    HENT:   Head: Normocephalic and atraumatic.   Mouth/Throat: No oropharyngeal exudate.   Eyes: Pupils are equal, round, and reactive to light. Conjunctivae and EOM are normal. No scleral icterus.   Neck: Normal range of motion. Neck supple. No JVD present. No tracheal deviation present. No thyromegaly present.   Cardiovascular: Normal rate, regular rhythm, S1 normal and S2 normal. Exam reveals no gallop and no friction rub.   No murmur heard.  Pulses:       Radial pulses are 2+ on the right side.   Pulmonary/Chest: Effort normal and breath sounds normal. No respiratory distress. She has no wheezes. She has no rales. She exhibits no tenderness.   Abdominal: Soft. She exhibits no distension.   obese   Musculoskeletal: Normal range of motion. She exhibits no edema.   Neurological: She is alert and oriented to person, place, and time. No cranial nerve deficit.   Skin: Skin is warm and dry. She is not diaphoretic.   Psychiatric: She has a normal mood and affect. Her behavior is normal. Judgment normal.       Current Medications:   atorvastatin  80 mg Oral Daily    clopidogrel  75 mg Oral Daily    enoxparin  1 mg/kg Subcutaneous Q12H    levothyroxine  100 mcg Oral Daily    lisinopril  5 mg Oral Daily    metoprolol tartrate  12.5 mg Oral BID    nitroGLYCERIN 2% TD oint  2 inch Topical (Top) Q6H    senna-docusate 8.6-50 mg  1 tablet Oral BID    sertraline  100 mg Oral Daily       acetaminophen, morphine, ondansetron, ondansetron, oxyCODONE-acetaminophen, sodium chloride 0.9%    Laboratory (all labs reviewed):  CBC:  Recent Labs   Lab 03/21/17  0641 04/16/18  1730 06/29/19 1801 07/24/19 2014 07/25/19  0257   WBC 11.97 6.33 9.61 7.76 8.92   Hemoglobin 10.3 L 13.4 13.9 13.2 13.2   Hematocrit 32.6 L 42.1 42.5 41.2 41.6   Platelets 260 221 279 274 298       CHEMISTRIES:  Recent Labs   Lab 03/21/17  0641 04/16/18  1730 06/29/19  1801 07/24/19 2014 07/25/19  0257   Glucose 106 96 96 220 H 111 H   Sodium 140 141 141  141 141   Potassium 4.5 4.3 3.7 3.7 4.1   BUN, Bld 16 26 H 15 22 21   Creatinine 1.1 1.5 H 1.3 1.5 H 1.4   eGFR if  56 A 38 A 45 A 38 A 41 A   eGFR if non  49 A 33 A 39 A 33 A 36 A   Calcium 9.5 10.5 11.2 H 10.6 H 10.8 H       CARDIAC BIOMARKERS:  Recent Labs   Lab 07/24/19 2014 07/24/19 2255 07/25/19  0257   Troponin I 0.425 H 0.406 H 0.542 H       COAGS:  Recent Labs   Lab 04/16/18  1730   INR 1.0       LIPIDS/LFTS:  Recent Labs   Lab 03/19/17  1655 04/16/18  1730 06/29/19  1801 07/24/19 2014 07/25/19  0257   Cholesterol  --   --   --   --  178   Triglycerides  --   --   --   --  71   HDL  --   --   --   --  54   LDL Cholesterol  --   --   --   --  109.8   Non-HDL Cholesterol  --   --   --   --  124   AST 12 16 21 21  --    ALT 8 L 13 12 17  --        BNP:  Recent Labs   Lab 03/19/17 1655 07/24/19 2014   BNP 30 80       TSH:  Recent Labs   Lab 07/25/19  0257   TSH 0.478       Free T4:        Diagnostic Results:  ECG (personally reviewed and interpreted tracing(s)):  7/24/19 1923 SR 88, PRWP, sept TWI (new), inflat ST abnl (new) vs 6/29/19    Chest X-Ray (personally reviewed and interpreted image(s)): 7/24/19 NAD    Echo: 9/17/14 MAGAN (repeat echo ordered)  EF 55%  No evidence of stress induced myocardial ischemia.

## 2019-07-25 NOTE — CONSULTS
[facility]  Cardiology  Consult Note    Patient Name: Tyra Soliz  MRN: [id number]  Admission Date: 7/24/2019  Hospital Length of Stay: 1 days  Code Status: Full Code   Attending Provider: Solo Allen MD   Consulting Provider: Carlos Alberto Yadav MD  Primary Care Physician: Marii Bauer MD  Principal Problem:NSTEMI (non-ST elevated myocardial infarction)    Patient information was obtained from patient and ER records.     Inpatient consult to Cardiology  Consult performed by: Carlos Alberto Yadav MD  Consult ordered by: Carlos Alberto Naqvi MD  Reason for consult: NSTEMI        Subjective:     Chief Complaint:  CP     HPI:   79 y.o. female that (in part)  has a past medical history of Chronic kidney disease, Hypertension, and Thyroid disease.  has a past surgical history that includes Hysterectomy; Joint replacement; Abdominal surgery; Cholecystectomy; Revision of scar tissue rectus muscle; and Back surgery. Presents to [facility] Emergency Department complaining of chest pain.  Radiation to left shoulder and jaw.  Worsened with exertion.  Started 4 hr prior to arrival.  S xs since include nausea, diaphoresis, shortness of breath, dyspnea with exertion.  She has been evaluated previously for similar symptoms.  In the emergency department routine laboratory studies, chest x-ray, EKG, cardiac enzymes were obtained.  Elevated troponin level.  ACS protocol initiated.  Being treated for non ST elevation MI.  Cardiology consulted.    The patient is a very pleasant 79-year-old woman with no prior cardiac history but does have a history of CKD 3.  She also has a history of aspirin allergy causing anaphylaxis.  The patient states that she spent a day and half in the intensive care unit previously after being given aspirin.  She now presents to the emergency room with complaints of chest discomfort which radiated up to her jaw.  Her troponin is elevated up to 0.5.   This on a background of creatinine of 1.4.  Her symptoms are persistent.  She denies any prior cardiac catheterization or stent placement.    Past Medical History:   Diagnosis Date    Chronic kidney disease     stage 3     Hypertension     Thyroid disease        Past Surgical History:   Procedure Laterality Date    ABDOMINAL SURGERY      removal scar tissue abdomen    BACK SURGERY      x3    CHOLECYSTECTOMY      HYSTERECTOMY      JOINT REPLACEMENT      Rt total knee    REVISION OF SCAR TISSUE RECTUS MUSCLE      after gall bladder surgery    REVISION-ARTHROPLASTY-KNEE-TOTAL Right 1/9/2017    Performed by Sav Green MD at [facility] OR       Review of patient's allergies indicates:   Allergen Reactions    Aspirin Anaphylaxis    Codeine Itching    Keflex [cephalexin] Itching    Moxifloxacin Itching    Penicillins Hives       No current facility-administered medications on file prior to encounter.      Current Outpatient Medications on File Prior to Encounter   Medication Sig    ergocalciferol (VITAMIN D2) 50,000 unit Cap Take 50,000 Units by mouth every 7 days. Taking on Monday's    hydrocodone-acetaminophen 5-325mg (NORCO) 5-325 mg per tablet Take 1 tablet by mouth every 6 (six) hours as needed for Pain.    levothyroxine (SYNTHROID) 100 MCG tablet Take 100 mcg by mouth once daily.    sertraline (ZOLOFT) 100 MG tablet Take 100 mg by mouth once daily.    triamterene-hydrochlorothiazide 37.5-25 mg (MAXZIDE-25) 37.5-25 mg per tablet Take 1 tablet by mouth once daily.    diltiaZEM (CARDIZEM CD) 180 MG 24 hr capsule Take 180 mg by mouth once daily.    diphenhydrAMINE (BENADRYL) 25 mg capsule Take 1 each (25 mg total) by mouth every 6 (six) hours as needed for Itching.    furosemide (LASIX) 20 MG tablet Take 20 mg by mouth 2 (two) times daily.    meclizine (ANTIVERT) 25 mg tablet Take 1 tablet (25 mg total) by mouth 3 (three) times daily as needed for Dizziness.    oxycodone-acetaminophen  (PERCOCET) 7.5-325 mg per tablet Take 1 tablet by mouth every 4 (four) hours as needed.     Family History     None        Tobacco Use    Smoking status: Former Smoker     Last attempt to quit:      Years since quittin.5    Smokeless tobacco: Never Used   Substance and Sexual Activity    Alcohol use: No    Drug use: No    Sexual activity: Never     Partners: Male     Review of Systems   Constitution: Negative for chills, diaphoresis, fever and malaise/fatigue.   HENT: Negative for nosebleeds.    Eyes: Negative for blurred vision and double vision.   Cardiovascular: Positive for chest pain. Negative for claudication, cyanosis, dyspnea on exertion, leg swelling, orthopnea, palpitations, paroxysmal nocturnal dyspnea and syncope.   Respiratory: Negative for cough, shortness of breath and wheezing.    Skin: Negative for dry skin and poor wound healing.   Musculoskeletal: Negative for back pain, joint swelling and myalgias.   Gastrointestinal: Negative for abdominal pain, nausea and vomiting.   Genitourinary: Negative for hematuria.   Neurological: Negative for dizziness, headaches, numbness, seizures and weakness.   Psychiatric/Behavioral: Negative for altered mental status and depression.     Objective:     Vital Signs (Most Recent):  Temp: 97.7 °F (36.5 °C) (19)  Pulse: 65 (19)  Resp: 20 (19)  BP: (!) 133/90 (19)  SpO2: 97 % (19) Vital Signs (24h Range):  Temp:  [97.6 °F (36.4 °C)-98.2 °F (36.8 °C)] 97.7 °F (36.5 °C)  Pulse:  [] 65  Resp:  [17-20] 20  SpO2:  [96 %-100 %] 97 %  BP: (119-199)/(56-91) 133/90     Weight: 94.9 kg (209 lb 3.5 oz)  Body mass index is 36.48 kg/m².    SpO2: 97 %  O2 Device (Oxygen Therapy): nasal cannula    No intake or output data in the 24 hours ending 19 0700    Lines/Drains/Airways     Peripheral Intravenous Line                 Peripheral IV - Single Lumen 19 2130 20 G Left Forearm less than 1 day                 Physical Exam   Constitutional: She is oriented to person, place, and time. She appears well-developed and well-nourished. No distress.   HENT:   Head: Normocephalic and atraumatic.   Mouth/Throat: No oropharyngeal exudate.   Eyes: Pupils are equal, round, and reactive to light. Conjunctivae and EOM are normal. No scleral icterus.   Neck: Normal range of motion. Neck supple. No JVD present. No tracheal deviation present. No thyromegaly present.   Cardiovascular: Normal rate, regular rhythm, S1 normal and S2 normal. Exam reveals no gallop and no friction rub.   No murmur heard.  Pulses:       Radial pulses are 2+ on the right side.   Pulmonary/Chest: Effort normal and breath sounds normal. No respiratory distress. She has no wheezes. She has no rales. She exhibits no tenderness.   Abdominal: Soft. She exhibits no distension.   obese   Musculoskeletal: Normal range of motion. She exhibits no edema.   Neurological: She is alert and oriented to person, place, and time. No cranial nerve deficit.   Skin: Skin is warm and dry. She is not diaphoretic.   Psychiatric: She has a normal mood and affect. Her behavior is normal. Judgment normal.       Current Medications:   atorvastatin  80 mg Oral Daily    clopidogrel  75 mg Oral Daily    enoxparin  1 mg/kg Subcutaneous Q12H    levothyroxine  100 mcg Oral Daily    lisinopril  5 mg Oral Daily    metoprolol tartrate  12.5 mg Oral BID    nitroGLYCERIN 2% TD oint  2 inch Topical (Top) Q6H    senna-docusate 8.6-50 mg  1 tablet Oral BID    sertraline  100 mg Oral Daily       acetaminophen, morphine, ondansetron, ondansetron, oxyCODONE-acetaminophen, sodium chloride 0.9%    Laboratory (all labs reviewed):  CBC:  Recent Labs   Lab 03/21/17  0641 04/16/18  1730 06/29/19  1801 07/24/19 2014 07/25/19  0257   WBC 11.97 6.33 9.61 7.76 8.92   Hemoglobin 10.3 L 13.4 13.9 13.2 13.2   Hematocrit 32.6 L 42.1 42.5 41.2 41.6   Platelets 260 221 279 274 298        CHEMISTRIES:  Recent Labs   Lab 03/21/17  0641 04/16/18  1730 06/29/19  1801 07/24/19 2014 07/25/19  0257   Glucose 106 96 96 220 H 111 H   Sodium 140 141 141 141 141   Potassium 4.5 4.3 3.7 3.7 4.1   BUN, Bld 16 26 H 15 22 21   Creatinine 1.1 1.5 H 1.3 1.5 H 1.4   eGFR if  56 A 38 A 45 A 38 A 41 A   eGFR if non  49 A 33 A 39 A 33 A 36 A   Calcium 9.5 10.5 11.2 H 10.6 H 10.8 H       CARDIAC BIOMARKERS:  Recent Labs   Lab 07/24/19 2014 07/24/19 2255 07/25/19  0257   Troponin I 0.425 H 0.406 H 0.542 H       COAGS:  Recent Labs   Lab 04/16/18  1730   INR 1.0       LIPIDS/LFTS:  Recent Labs   Lab 03/19/17  1655 04/16/18  1730 06/29/19  1801 07/24/19 2014 07/25/19  0257   Cholesterol  --   --   --   --  178   Triglycerides  --   --   --   --  71   HDL  --   --   --   --  54   LDL Cholesterol  --   --   --   --  109.8   Non-HDL Cholesterol  --   --   --   --  124   AST 12 16 21 21  --    ALT 8 L 13 12 17  --        BNP:  Recent Labs   Lab 03/19/17  1655 07/24/19 2014   BNP 30 80       TSH:  Recent Labs   Lab 07/25/19  0257   TSH 0.478       Free T4:        Diagnostic Results:  ECG (personally reviewed and interpreted tracing(s)):  7/24/19 1923 SR 88, PRWP, sept TWI (new), inflat ST abnl (new) vs 6/29/19    Chest X-Ray (personally reviewed and interpreted image(s)): 7/24/19 NAD    Echo: 9/17/14 MAGAN (repeat echo ordered)  EF 55%  No evidence of stress induced myocardial ischemia.       Assessment and Plan:     * NSTEMI (non-ST elevated myocardial infarction)  Concerning CP with elev trop c/w NSTEMI  Sxs ongoing  Cont enox/Plavix/Statin/BBL/ACEi  Check echo  Diag Cath planned today for further assessment  If PCI needed, will need ASA desensitization    Risks, benefits and alternatives of the catheterization procedure were discussed with the patient which include but are not limited to: bleeding, infection, death, heart attack, arrhythmia, kidney failure, stroke, need for emergency  surgery, etc.  Patient understands and and agrees to proceed.  Consent was placed on the chart.      Chronic kidney disease, stage III (moderate)  Creat 1.4, stable    Essential hypertension  Controlled  Cont med rx    Aspirin allergy  Pt reports anaphylaxis and ICU stay with prior ASA rx.        VTE Risk Mitigation (From admission, onward)        Ordered     IP VTE HIGH RISK PATIENT  Once      07/25/19 0239     Place GI hose  Until discontinued      07/25/19 0239     Place sequential compression device  Until discontinued      07/25/19 0239     enoxaparin injection 100 mg  Every 12 hours (non-standard times)      07/24/19 2740          Thank you for your consult. I will follow-up with patient. Please contact us if you have any additional questions.    Carlos Alberto Yadav MD  Cardiology   [facility] [place]

## 2019-07-25 NOTE — H&P
[facility] Hospital Medicine  History & Physical    Patient Name: Tyra Soliz  MRN: [id number]  Admission Date: 07/25/2019  Attending Physician: Carlos Alberto Naqvi MD, MPH      PCP:     Marii Bauer MD    CC:     Chief Complaint   Patient presents with    Chest Pain     Pt reports center chest pain radiating to her L shoulder and into the jaw. Pt denies cardiac hx. Pt seen here last month for same and dx with anxiety.        HISTORY OF PRESENT ILLNESS:     Tyra Soliz is a 79 y.o. female that (in part)  has a past medical history of Chronic kidney disease, Hypertension, and Thyroid disease.  has a past surgical history that includes Hysterectomy; Joint replacement; Abdominal surgery; Cholecystectomy; Revision of scar tissue rectus muscle; and Back surgery. Presents to [facility] Emergency Department complaining of chest pain.  Radiation to left shoulder and jaw.  Worsened with exertion.  Started March 4 hr prior to arrival.  Social since include nausea, diaphoresis, shortness of breath, dyspnea with exertion.  She has been evaluated previously for similar symptoms.    In the emergency department routine laboratory studies, chest x-ray, EKG, cardiac enzymes were obtained.  Elevated troponin level.  ACS protocol initiated.  Being treated for non ST elevation MI.  Cardiology consulted..    Hospital medicine has been asked to admit for further evaluation and treatment.       REVIEW OF SYSTEMS:     -- Constitutional: No fever or chills.  -- Eyes: No visual changes, diplopia, pain, tearing, blind spots, or discharge.   -- Ears, nose, mouth, throat, and face: No congestion, sore throat, epistaxis, d/c, bleeding gums, neck stiffness masses, or dental issues.  -- Respiratory: No cough, shortness of breath, hemoptysis, stridor, wheezing, or night sweats.  -- Cardiovascular:  As above in the HPI.   -- Gastrointestinal: No vomiting, abdominal pain, hematemesis,  melena, dyspepsia, or change in bowel habits.  -- Genitourinary: No hematuria, dysuria, frequency, urgency, nocturia, polyuria, stones, or incontinence.  -- Integument/breast: No rash, pruritis, pigmentation changes, dryness, or changes in hair  -- Hematologic/lymphatic: No easy bruising or lymphadenopathy.   -- Musculoskeletal:  Chronic arthralgias No acute arthralgias, acute myalgias, joint swelling, acute limitations of ROM, or acute muscular weakness.  -- Neurological: No seizures, headaches, incoordination, paraesthesias, ataxia, vertigo, or tremors.  -- Behavioral/Psych: No auditory or visual hallucinations, depression, or suicidal/homicidal ideations.  -- Endocrine: No heat or cold intolerance, polydipsia, or unintentional weight gain / loss.  -- Allergy/Immunologic: No recurrent infections or adverse reaction to food, insects, or difficulty breathing.        PAST MEDICAL / SURGICAL HISTORY:     Past Medical History:   Diagnosis Date    Chronic kidney disease     stage 3     Hypertension     Thyroid disease      Past Surgical History:   Procedure Laterality Date    ABDOMINAL SURGERY      removal scar tissue abdomen    BACK SURGERY      x3    CHOLECYSTECTOMY      HYSTERECTOMY      JOINT REPLACEMENT      Rt total knee    REVISION OF SCAR TISSUE RECTUS MUSCLE      after gall bladder surgery    REVISION-ARTHROPLASTY-KNEE-TOTAL Right 1/9/2017    Performed by Sav Green MD at [facility] OR         FAMILY HISTORY:     History reviewed. No pertinent family history.      SOCIAL HISTORY:     Social History     Socioeconomic History    Marital status:      Spouse name: Not on file    Number of children: Not on file    Years of education: Not on file    Highest education level: Not on file   Occupational History    Not on file   Social Needs    Financial resource strain: Not on file    Food insecurity:     Worry: Not on file     Inability: Not on file    Transportation needs:     Medical:  Not on file     Non-medical: Not on file   Tobacco Use    Smoking status: Former Smoker     Last attempt to quit:      Years since quittin.5    Smokeless tobacco: Never Used   Substance and Sexual Activity    Alcohol use: No    Drug use: No    Sexual activity: Never     Partners: Male   Lifestyle    Physical activity:     Days per week: Not on file     Minutes per session: Not on file    Stress: Not on file   Relationships    Social connections:     Talks on phone: Not on file     Gets together: Not on file     Attends Confucianism service: Not on file     Active member of club or organization: Not on file     Attends meetings of clubs or organizations: Not on file     Relationship status: Not on file   Other Topics Concern    Not on file   Social History Narrative    Not on file         ALLERGIES:       Review of patient's allergies indicates:   Allergen Reactions    Aspirin Anaphylaxis    Codeine Itching    Keflex [cephalexin] Itching    Moxifloxacin Itching    Penicillins Hives         HOME MEDICATIONS:     Prior to Admission medications    Medication Sig Start Date End Date Taking? Authorizing Provider   hydrocodone-acetaminophen 5-325mg (NORCO) 5-325 mg per tablet Take 1 tablet by mouth every 6 (six) hours as needed for Pain.   Yes Historical Provider, MD   levothyroxine (SYNTHROID) 100 MCG tablet Take 100 mcg by mouth once daily.   Yes Historical Provider, MD   sertraline (ZOLOFT) 100 MG tablet Take 100 mg by mouth once daily.   Yes Historical Provider, MD   triamterene-hydrochlorothiazide 37.5-25 mg (MAXZIDE-25) 37.5-25 mg per tablet Take 1 tablet by mouth once daily.   Yes Historical Provider, MD   diltiaZEM (CARDIZEM CD) 180 MG 24 hr capsule Take 180 mg by mouth once daily.    Historical Provider, MD   diphenhydrAMINE (BENADRYL) 25 mg capsule Take 1 each (25 mg total) by mouth every 6 (six) hours as needed for Itching. 17   Sav Green MD   ergocalciferol (VITAMIN D2)  "50,000 unit Cap Take 50,000 Units by mouth every 7 days. Taking on Monday's    Historical Provider, MD   furosemide (LASIX) 20 MG tablet Take 20 mg by mouth 2 (two) times daily.    Historical Provider, MD   meclizine (ANTIVERT) 25 mg tablet Take 1 tablet (25 mg total) by mouth 3 (three) times daily as needed for Dizziness. 6/29/19   Edmundo Wing MD   oxycodone-acetaminophen (PERCOCET) 7.5-325 mg per tablet Take 1 tablet by mouth every 4 (four) hours as needed. 1/12/17   Sav Green MD          [facility] MEDICATIONS:     Scheduled Meds:    atorvastatin  80 mg Oral Daily    clopidogrel  75 mg Oral Daily    enoxparin  1 mg/kg Subcutaneous Q12H    levothyroxine  100 mcg Oral Daily    lisinopril  5 mg Oral Daily    metoprolol tartrate  12.5 mg Oral BID    nitroGLYCERIN 2% TD oint  2 inch Topical (Top) Q6H    senna-docusate 8.6-50 mg  1 tablet Oral BID    sertraline  100 mg Oral Daily     Continuous Infusions:   PRN Meds:       PHYSICAL EXAM:     Wt Readings from Last 1 Encounters:   07/25/19 0302 94.9 kg (209 lb 3.5 oz)   07/25/19 0241 94.9 kg (209 lb 3.5 oz)   07/24/19 1928 95.7 kg (211 lb)     Body mass index is 36.48 kg/m².  Vitals:    07/25/19 0302 07/25/19 0323 07/25/19 0327 07/25/19 0428   BP: (!) 146/79 (!) 159/74 134/63 (!) 133/90   BP Location:    Right arm   Patient Position:    Lying   Pulse: 65 63 63 65   Resp:    20   Temp:    97.7 °F (36.5 °C)   TempSrc:    Oral   SpO2:  100% 100% 97%   Weight: 94.9 kg (209 lb 3.5 oz)      Height: 5' 3.5" (1.613 m)             -- General appearance: well developed. appears stated age   -- Head: normocephalic, atraumatic   -- Eyes: conjunctivae clear. Extraocular muscles intact  -- Nose: Nares normal. Septum midline.   -- Mouth/Throat: lips, mucosa, and tongue normal. no throat erythema.   -- Neck: supple, symmetrical, trachea midline, no JVD and thyroid not grossly enlarged, appears symmetric  -- Lungs: clear to auscultation bilaterally. normal " respiratory effort. No use of accessory muscles.   -- Chest wall: no tenderness. equal bilateral chest rise   -- Heart: regular rate and regular rhythm. S1, S2 normal.  no click, rub or gallop   -- Abdomen: soft, non-tender, non-distended, non-tympanic; bowel sounds normal; no masses  -- Extremities: no cyanosis, clubbing or edema.   -- Pulses: 2+ and symmetric   -- Skin: color normal, texture normal, turgor normal. No rashes or lesions.   -- Neurologic: Normal strength and tone. No focal numbness or weakness. CNII-XII intact. Albino coma scale: eyes open spontaneously-4, oriented & converses-5, obeys commands-6.      LABORATORY STUDIES:     Recent Results (from the past 36 hour(s))   CBC auto differential    Collection Time: 07/24/19  8:14 PM   Result Value Ref Range    WBC 7.76 3.90 - 12.70 K/uL    RBC 4.31 4.00 - 5.40 M/uL    Hemoglobin 13.2 12.0 - 16.0 g/dL    Hematocrit 41.2 37.0 - 48.5 %    Mean Corpuscular Volume 96 82 - 98 fL    Mean Corpuscular Hemoglobin 30.6 27.0 - 31.0 pg    Mean Corpuscular Hemoglobin Conc 32.0 32.0 - 36.0 g/dL    RDW 13.6 11.5 - 14.5 %    Platelets 274 150 - 350 K/uL    MPV 11.1 9.2 - 12.9 fL    Gran # (ANC) 5.1 1.8 - 7.7 K/uL    Lymph # 2.0 1.0 - 4.8 K/uL    Mono # 0.5 0.3 - 1.0 K/uL    Eos # 0.2 0.0 - 0.5 K/uL    Baso # 0.01 0.00 - 0.20 K/uL    Gran% 65.9 38.0 - 73.0 %    Lymph% 26.0 18.0 - 48.0 %    Mono% 6.2 4.0 - 15.0 %    Eosinophil% 1.9 0.0 - 8.0 %    Basophil% 0.1 0.0 - 1.9 %    Differential Method Automated    Comprehensive metabolic panel    Collection Time: 07/24/19  8:14 PM   Result Value Ref Range    Sodium 141 136 - 145 mmol/L    Potassium 3.7 3.5 - 5.1 mmol/L    Chloride 104 95 - 110 mmol/L    CO2 27 23 - 29 mmol/L    Glucose 220 (H) 70 - 110 mg/dL    BUN, Bld 22 8 - 23 mg/dL    Creatinine 1.5 (H) 0.5 - 1.4 mg/dL    Calcium 10.6 (H) 8.7 - 10.5 mg/dL    Total Protein 7.5 6.0 - 8.4 g/dL    Albumin 3.8 3.5 - 5.2 g/dL    Total Bilirubin 0.5 0.1 - 1.0 mg/dL    Alkaline  Phosphatase 79 55 - 135 U/L    AST 21 10 - 40 U/L    ALT 17 10 - 44 U/L    Anion Gap 10 8 - 16 mmol/L    eGFR if African American 38 (A) >60 mL/min/1.73 m^2    eGFR if non African American 33 (A) >60 mL/min/1.73 m^2   Troponin I #1    Collection Time: 07/24/19  8:14 PM   Result Value Ref Range    Troponin I 0.425 (H) 0.000 - 0.026 ng/mL   B-Type natriuretic peptide (BNP)    Collection Time: 07/24/19  8:14 PM   Result Value Ref Range    BNP 80 0 - 99 pg/mL   Troponin I #2    Collection Time: 07/24/19 10:55 PM   Result Value Ref Range    Troponin I 0.406 (H) 0.000 - 0.026 ng/mL   Type and Screen    Collection Time: 07/24/19 10:55 PM   Result Value Ref Range    Group & Rh B NEG     Indirect Whitney NEG    Troponin I    Collection Time: 07/25/19  2:57 AM   Result Value Ref Range    Troponin I 0.542 (H) 0.000 - 0.026 ng/mL   CBC auto differential    Collection Time: 07/25/19  2:57 AM   Result Value Ref Range    WBC 8.92 3.90 - 12.70 K/uL    RBC 4.34 4.00 - 5.40 M/uL    Hemoglobin 13.2 12.0 - 16.0 g/dL    Hematocrit 41.6 37.0 - 48.5 %    Mean Corpuscular Volume 96 82 - 98 fL    Mean Corpuscular Hemoglobin 30.4 27.0 - 31.0 pg    Mean Corpuscular Hemoglobin Conc 31.7 (L) 32.0 - 36.0 g/dL    RDW 13.5 11.5 - 14.5 %    Platelets 298 150 - 350 K/uL    MPV 11.2 9.2 - 12.9 fL    Gran # (ANC) 4.9 1.8 - 7.7 K/uL    Lymph # 3.2 1.0 - 4.8 K/uL    Mono # 0.7 0.3 - 1.0 K/uL    Eos # 0.2 0.0 - 0.5 K/uL    Baso # 0.03 0.00 - 0.20 K/uL    Gran% 54.8 38.0 - 73.0 %    Lymph% 36.0 18.0 - 48.0 %    Mono% 7.5 4.0 - 15.0 %    Eosinophil% 1.7 0.0 - 8.0 %    Basophil% 0.3 0.0 - 1.9 %    Differential Method Automated    Basic metabolic panel    Collection Time: 07/25/19  2:57 AM   Result Value Ref Range    Sodium 141 136 - 145 mmol/L    Potassium 4.1 3.5 - 5.1 mmol/L    Chloride 102 95 - 110 mmol/L    CO2 29 23 - 29 mmol/L    Glucose 111 (H) 70 - 110 mg/dL    BUN, Bld 21 8 - 23 mg/dL    Creatinine 1.4 0.5 - 1.4 mg/dL    Calcium 10.8 (H) 8.7 - 10.5  mg/dL    Anion Gap 10 8 - 16 mmol/L    eGFR if African American 41 (A) >60 mL/min/1.73 m^2    eGFR if non African American 36 (A) >60 mL/min/1.73 m^2   Lipid panel - if not done in ED    Collection Time: 07/25/19  2:57 AM   Result Value Ref Range    Cholesterol 178 120 - 199 mg/dL    Triglycerides 71 30 - 150 mg/dL    HDL 54 40 - 75 mg/dL    LDL Cholesterol 109.8 63.0 - 159.0 mg/dL    Hdl/Cholesterol Ratio 30.3 20.0 - 50.0 %    Total Cholesterol/HDL Ratio 3.3 2.0 - 5.0    Non-HDL Cholesterol 124 mg/dL   TSH - if not done in the ED    Collection Time: 07/25/19  2:57 AM   Result Value Ref Range    TSH 0.478 0.400 - 4.000 uIU/mL   POCT glucose    Collection Time: 07/25/19  2:58 AM   Result Value Ref Range    POCT Glucose 106 70 - 110 mg/dL       Lab Results   Component Value Date    INR 1.0 04/16/2018    INR 1.0 06/24/2016     No results found for: HGBA1C  Recent Labs     07/25/19  0258   POCTGLUCOSE 106         IMAGING:     Imaging Results          X-Ray Chest AP Portable (Final result)  Result time 07/24/19 20:21:12    Final result by Jose Sandy MD (07/24/19 20:21:12)                 Impression:      No acute process.      Electronically signed by: Jose Sandy MD  Date:    07/24/2019  Time:    20:21             Narrative:    EXAMINATION:  XR CHEST AP PORTABLE    CLINICAL HISTORY:  Chest Pain;    TECHNIQUE:  Single frontal view of the chest was performed.    COMPARISON:  06/29/2019.    FINDINGS:  Monitoring EKG leads are present.  The trachea is unremarkable.  The cardiomediastinal silhouette is within normal limits.  The hilar structures are unremarkable.  The hemidiaphragms are within normal limits.  There is no evidence of free air beneath the hemidiaphragms.  There are no pleural effusions.  There is no evidence of a pneumothorax.  There is no evidence of pneumomediastinum.  No airspace opacity is present.  The osseous structures are unremarkable.                                  CONSULTS:     IP CONSULT TO  CARDIOLOGY       ASSESSMENT & PLAN:     Primary Diagnosis:  NSTEMI (non-ST elevated myocardial infarction)    Active Hospital Problems    Diagnosis  POA    *NSTEMI (non-ST elevated myocardial infarction) [I21.4]  Yes    Essential hypertension [I10]  Yes    Chest pain [R07.9]  Yes    Hypothyroidism due to acquired atrophy of thyroid [E03.4]  Yes    Chronic kidney disease, stage III (moderate) [N18.3]  Yes      Resolved Hospital Problems   No resolved problems to display.     Acute Chest Pain, rule-out myocardial infarction   · Intitial EKG findings shows no evidence of ST-elevation MI  · Three troponins are elevated  Recent Labs     07/24/19 2014 07/24/19  2255 07/25/19  0257   TROPONINI 0.425* 0.406* 0.542*       http://clincalc.com/[email or web address]/ascvd/[email or web address]  · Initiate ACS protocol to rule out MI, then explore noncardiac etiology  · Trend cardiac enzymes  · Aspirin  · Beta-blocker (after stress or immediately if evidence of NSTEMI)  · Statin  · Supplemental oxygen  · nitroglycerine and morphine PRN  · 2D echocardiogram  · TSH, FT3, FT4  · ESR and cardiac specific CRP   · PT, PTT, INR   · Tight glycemic control  · Monitor on telemetry unit  · Consult cardiologist  · Will admit for rule out acute MI and possible further provocative testing for risk stratification.      Hypertension  · Goal while inpatient is a systolic blood pressure less than 140mmHg given elevated troponin level  · BP in acceptable range at this time  · Continue current home regimen with hold parameters  · PRN antihypertensives available      Thyroid dysfunction   · Clinically, patient is euthyroid   · Chemically, undetermined  · Obtain TSH, free T3, and free T4  · Continue current regimen    Chronic kidney disease  · Renal dose medications  · Avoid nephrotoxic agents  · Maintain euvolemic state    VTE Risk Mitigation (From admission, onward)        Ordered     IP VTE HIGH RISK PATIENT  Once      07/25/19 0239     Place GI hose   Until discontinued      07/25/19 0239     Place sequential compression device  Until discontinued      07/25/19 0239     enoxaparin injection 100 mg  Every 12 hours (non-standard times)      07/24/19 2132            Adult PRN medications available   DVT prophylaxis given       DISPOSITION:     Will admit to the Hospital Medicine service for further evaluation and treatment.    Chart reviewed and updated where applicable.    High Risk Conditions:  Patient has a condition that poses threat to life and bodily function:  Non ST-elevation MI    ===============================================================    Carlos Alberto Naqvi MD, MPH  Department of Hospital Medicine   [facility]  [phone] pg  (7pm - 6am)          This note is dictated using Geodruid voice recognition software.  There are word recognition mistakes that are occasionally missed on review.

## 2019-07-25 NOTE — BRIEF OP NOTE
[facility]  Brief Operative Note     SUMMARY     Surgery Date: 7/25/2019     Surgeon(s) and Role:     * Carlos Alberto Yadav MD - Primary    Assisting Surgeon: None    Pre-op Diagnosis:  NSTEMI (non-ST elevated myocardial infarction) [I21.4]    Post-op Diagnosis:  Post-Op Diagnosis Codes:     * NSTEMI (non-ST elevated myocardial infarction) [I21.4]    Procedure(s) (LRB):  Left heart cath R rad access (Left)  Percutaneous coronary intervention (N/A)    Anesthesia: RN IV Sedation    Description of the findings of the procedure: see below    Findings/Key Components:  LVEDP: 10mmHg  LVEF: prelim normal by echo    Dominance: Right  LM: normal  LAD: prox 90%  LCx: normal  RCA: prox-mid 40%    PCI prox LAD:  Preop Plavix/enox, intraop heparin/aggrastat (ASA allegry noted)  Predil 2.5x15  Stent 2.75x26 Resolute Bland ROBERTO  Postdil prox 3.0x12 NC 20 janelle  Excellent result, T3 flow, 0% residual stenosis    Hemostasis:  R Radial band    Impression:  NSTEMI  2V CAD, normal LV fxn  ASA allergy noted  Successful PCI prox LAD 2.75x26 Resolute Bland RBOERTO  R rad vasband for hemostasis    Plan:  Observe in ICU  Aggrastat gtt x12 hrs  ASA desentization protocol  Cont ASA 81mg qd indefinitely thereafter  Plavix 75mg qd for 1 year (thru 7/2020)  BBl/ACE/Statin  Home in am  Follow up with Dr. Yadav 2 weeks  Outpat cardiac rehab referral  Outpat MPI for assessment of RCA stenosis    Estimated Blood Loss: <50cc         Specimens: None

## 2019-07-25 NOTE — ASSESSMENT & PLAN NOTE
Cards consulted and will take the patient to [facility] today.  Follow cards recs. Currently CP free

## 2019-07-25 NOTE — NURSING
Note that patient awake, alert and fully oriented;  Reports 4/10 CP this am;  Scheduled topical nitroglycerin given at 0510; On O2@2L NC;  Denies use of home O2;   Patient is ambulatory with one-person stand-by;    Prep for Cath Lab completed;  NS infusing @250ml;  Patient clipped and Hibiclens bath given;    Will continue to f/u;

## 2019-07-25 NOTE — ED NOTES
"Pt requesting new PIV, pt states " this one hurts, can you put it somewhere else." PIV changed to different site. Pt reports feeling more comfortable  "

## 2019-07-25 NOTE — NURSING
Received patient resting in bed  With eyes open. Resp. Even non-labored no acute distress noted,. Patient able to make needs known.  Patient oriented to call light system, restroom and staff. Safety maintained, bed locked and in lowest position with call light in reach. Tele monitor in place. Will monitor.

## 2019-07-25 NOTE — PROGRESS NOTES
[facility] Hospital Medicine  Progress Note    Patient Name: Tyra Soliz  MRN: [id number]  Patient Class: IP- Inpatient   Admission Date: 7/24/2019  Length of Stay: 1 days  Attending Physician: Solo Allen MD  Primary Care Provider: Marii Bauer MD        Subjective:     Principal Problem:NSTEMI (non-ST elevated myocardial infarction)      HPI:    Tyra Soliz is a 79 y.o. female that (in part)  has a past medical history of Chronic kidney disease, Hypertension, and Thyroid disease.  has a past surgical history that includes Hysterectomy; Joint replacement; Abdominal surgery; Cholecystectomy; Revision of scar tissue rectus muscle; and Back surgery. Presents to [facility] Emergency Department complaining of chest pain.  Radiation to left shoulder and jaw.  Worsened with exertion.  Started March 4 hr prior to arrival.  Social since include nausea, diaphoresis, shortness of breath, dyspnea with exertion.  She has been evaluated previously for similar symptoms.    In the emergency department routine laboratory studies, chest x-ray, EKG, cardiac enzymes were obtained.  Elevated troponin level.  ACS protocol initiated.  Being treated for non ST elevation MI.  Cardiology consulted..    Hospital medicine has been asked to admit for further evaluation and treatment.     Overview/Hospital Course:  Tyra Soliz is a 79 y.o. female that (in part)  has a past medical history of Chronic kidney disease, Hypertension, and Thyroid disease.  has a past surgical history that includes Hysterectomy; Joint replacement; Abdominal surgery; Cholecystectomy; Revision of scar tissue rectus muscle; and Back surgery. Presents to [facility] Emergency Department complaining of chest pain.  Radiation to left shoulder and jaw.  Worsened with exertion.  Started March 4 hr prior to arrival.  Social since include nausea, diaphoresis, shortness of breath,  dyspnea with exertion.  She has been evaluated previously for similar symptoms.    In the emergency department routine laboratory studies, chest x-ray, EKG, cardiac enzymes were obtained.  Elevated troponin level.  ACS protocol initiated.  Being treated for non ST elevation MI.  Cardiology consulted.  Patient was taken to Cath lab on 7/25. Note that patient has serious ASA allergy with a history of anaphylaxis. The patient was also noted to have hypercalcemia on admission. IV fluids started and PTH ordered. Patient lives at home with  and does not use a walker or cane      Interval History: resting comfortably. No current CP.    Review of Systems   Constitutional: Negative for activity change.   HENT: Negative for congestion.    Respiratory: Negative for chest tightness and shortness of breath.    Cardiovascular: Negative for chest pain.   Gastrointestinal: Negative for abdominal pain.   Genitourinary: Negative for difficulty urinating.     Objective:     Vital Signs (Most Recent):  Temp: 97.7 °F (36.5 °C) (07/25/19 0428)  Pulse: 65 (07/25/19 0428)  Resp: 20 (07/25/19 0428)  BP: (!) 133/90 (07/25/19 0428)  SpO2: 97 % (07/25/19 0428) Vital Signs (24h Range):  Temp:  [97.6 °F (36.4 °C)-98.2 °F (36.8 °C)] 97.7 °F (36.5 °C)  Pulse:  [] 65  Resp:  [17-20] 20  SpO2:  [96 %-100 %] 97 %  BP: (119-199)/(56-91) 133/90     Weight: 94.9 kg (209 lb 3.5 oz)  Body mass index is 36.48 kg/m².  No intake or output data in the 24 hours ending 07/25/19 0806   Physical Exam   Constitutional: She is oriented to person, place, and time. She appears well-developed and well-nourished.   HENT:   Head: Normocephalic and atraumatic.   Cardiovascular: Normal rate and regular rhythm.   Pulmonary/Chest: Effort normal and breath sounds normal.   Neurological: She is alert and oriented to person, place, and time.   Psychiatric: She has a normal mood and affect. Her behavior is normal.   Nursing note and vitals  reviewed.      Significant Labs:   BMP:   Recent Labs   Lab 07/25/19 0257   *      K 4.1      CO2 29   BUN 21   CREATININE 1.4   CALCIUM 10.8*     CBC:   Recent Labs   Lab 07/24/19 2014 07/25/19 0257   WBC 7.76 8.92   HGB 13.2 13.2   HCT 41.2 41.6    298       Significant Imaging:       Assessment/Plan:      * NSTEMI (non-ST elevated myocardial infarction)  Cards consulted and will take the patient to [facility] today.  Follow cards recs. Currently CP free       Aspirin allergy  Noted       Chest pain  From above. None currently       Essential hypertension  Benign essential.      Hypothyroidism due to acquired atrophy of thyroid  Resume home meds.       Chronic kidney disease, stage III (moderate)  At baseline       Hypercalcemia- will check PTH. IV fluids.     VTE Risk Mitigation (From admission, onward)        Ordered     IP VTE HIGH RISK PATIENT  Once      07/25/19 0239     Place GI hose  Until discontinued      07/25/19 0239     Place sequential compression device  Until discontinued      07/25/19 0239     enoxaparin injection 100 mg  Every 12 hours (non-standard times)      07/24/19 2132          [facility] today       Solo Claudio MD  Department of Hospital Medicine   [facility]

## 2019-07-25 NOTE — PROGRESS NOTES
Pt transferred to ICU post L heart cath. Vasc band in place. Agrrastat and normal saline infusing per order. Denies chest pain, SOB, or other complaints. Room air.

## 2019-07-25 NOTE — ASSESSMENT & PLAN NOTE
Concerning CP with elev trop c/w NSTEMI  Sxs ongoing  Cont enox/Plavix/Statin/BBL/ACEi  Check echo  Diag Cath planned today for further assessment  If PCI needed, will need ASA desensitization

## 2019-07-25 NOTE — NURSING
"Patient currently resting in bed with eyes open. Resp. Even non-labored no acute distress noted. Patient rates chest pain at 3/10. Stated, "I feel much better now." safety maintained. Will monitor.   "

## 2019-07-25 NOTE — SUBJECTIVE & OBJECTIVE
Interval History: resting comfortably. No current CP.    Review of Systems   Constitutional: Negative for activity change.   HENT: Negative for congestion.    Respiratory: Negative for chest tightness and shortness of breath.    Cardiovascular: Negative for chest pain.   Gastrointestinal: Negative for abdominal pain.   Genitourinary: Negative for difficulty urinating.     Objective:     Vital Signs (Most Recent):  Temp: 97.7 °F (36.5 °C) (07/25/19 0428)  Pulse: 65 (07/25/19 0428)  Resp: 20 (07/25/19 0428)  BP: (!) 133/90 (07/25/19 0428)  SpO2: 97 % (07/25/19 0428) Vital Signs (24h Range):  Temp:  [97.6 °F (36.4 °C)-98.2 °F (36.8 °C)] 97.7 °F (36.5 °C)  Pulse:  [] 65  Resp:  [17-20] 20  SpO2:  [96 %-100 %] 97 %  BP: (119-199)/(56-91) 133/90     Weight: 94.9 kg (209 lb 3.5 oz)  Body mass index is 36.48 kg/m².  No intake or output data in the 24 hours ending 07/25/19 0806   Physical Exam   Constitutional: She is oriented to person, place, and time. She appears well-developed and well-nourished.   HENT:   Head: Normocephalic and atraumatic.   Cardiovascular: Normal rate and regular rhythm.   Pulmonary/Chest: Effort normal and breath sounds normal.   Neurological: She is alert and oriented to person, place, and time.   Psychiatric: She has a normal mood and affect. Her behavior is normal.   Nursing note and vitals reviewed.      Significant Labs:   BMP:   Recent Labs   Lab 07/25/19 0257   *      K 4.1      CO2 29   BUN 21   CREATININE 1.4   CALCIUM 10.8*     CBC:   Recent Labs   Lab 07/24/19 2014 07/25/19 0257   WBC 7.76 8.92   HGB 13.2 13.2   HCT 41.2 41.6    298       Significant Imaging:

## 2019-07-25 NOTE — ED PROVIDER NOTES
Encounter Date: 7/24/2019    SCRIBE #1 NOTE: I, Kimberly Dung, am scribing for, and in the presence of, Hank Bianchi MD.       History     Chief Complaint   Patient presents with    Chest Pain     Pt reports center chest pain radiating to her L shoulder and into the jaw. Pt denies cardiac hx. Pt seen here last month for same and dx with anxiety.      79-year-old female with PMHx of arthritis, HTN, and CKD presents to ED with complaints of constant mid-sternal chest pain that radiates to left side and up to jaw that stated 4 hours ago and has decreased in severity upon arrival to ED. Patient states she was holding her great grand child when she felt gradual onset of symptoms.  Associated symptoms include SOB, nausea, eructation, diaphoresis, and dyspnea on exertion. Patient denies vomiting or diarrhea. Patient reports pain is worse when sitting up. Patient denies any alleviating factors. Patient reports visiting the ED on June 29 for similar symptoms.  Patient denies alcohol or tobacco use. She denies Hx of DM or HLD. She is allergic to aspirin, codeine, Keflex, Moxifloxacin, and penicillin            Review of patient's allergies indicates:   Allergen Reactions    Aspirin Anaphylaxis    Codeine Itching    Keflex [cephalexin] Itching    Moxifloxacin Itching    Penicillins Hives     Past Medical History:   Diagnosis Date    Chronic kidney disease     stage 3     Hypertension     Thyroid disease      Past Surgical History:   Procedure Laterality Date    ABDOMINAL SURGERY      removal scar tissue abdomen    BACK SURGERY      x3    CHOLECYSTECTOMY      HYSTERECTOMY      JOINT REPLACEMENT      Rt total knee    REVISION OF SCAR TISSUE RECTUS MUSCLE      after gall bladder surgery    REVISION-ARTHROPLASTY-KNEE-TOTAL Right 1/9/2017    Performed by Sav Green MD at [facility] OR     History reviewed. No pertinent family history.  Social History     Tobacco Use    Smoking status: Former Smoker      Last attempt to quit:      Years since quittin.5    Smokeless tobacco: Never Used   Substance Use Topics    Alcohol use: No    Drug use: No     Review of Systems   Constitutional: Positive for diaphoresis.   HENT: Negative for congestion.    Eyes: Negative for discharge.   Respiratory: Positive for shortness of breath.         (+) dyspnea on exertion   Cardiovascular: Positive for chest pain and leg swelling (to left leg secondary to knee surgery).   Gastrointestinal: Positive for nausea. Negative for diarrhea and vomiting.        (+)eructation    Genitourinary: Negative for difficulty urinating.   Skin: Negative for rash.   Neurological: Negative for syncope.   Psychiatric/Behavioral: Negative for confusion.       Physical Exam     Initial Vitals [19]   BP Pulse Resp Temp SpO2   (!) 137/90 99 20 97.6 °F (36.4 °C) 98 %      MAP       --         Physical Exam    Nursing note and vitals reviewed.  Constitutional: She appears well-developed and well-nourished. She is not diaphoretic. No distress.   HENT:   Head: Normocephalic and atraumatic.   Mouth/Throat: Oropharynx is clear and moist.   Eyes: EOM are normal. Pupils are equal, round, and reactive to light.   Neck: Normal range of motion. Neck supple.   Cardiovascular: Normal rate, regular rhythm, normal heart sounds and intact distal pulses.   Pulmonary/Chest: Breath sounds normal. No respiratory distress. She has no wheezes. She has no rhonchi. She has no rales.   Abdominal: Soft. Bowel sounds are normal. There is no tenderness.   Musculoskeletal: Normal range of motion. She exhibits edema (trace pitting edema bilaterally).   Edema chronic per patient   Neurological: She is alert and oriented to person, place, and time. She has normal strength. No cranial nerve deficit.   Skin: Skin is warm and dry.   Psychiatric: She has a normal mood and affect. Her behavior is normal.         ED Course   Procedures  Labs Reviewed   COMPREHENSIVE METABOLIC  PANEL - Abnormal; Notable for the following components:       Result Value    Glucose 220 (*)     Creatinine 1.5 (*)     Calcium 10.6 (*)     eGFR if  38 (*)     eGFR if non  33 (*)     All other components within normal limits   TROPONIN I - Abnormal; Notable for the following components:    Troponin I 0.425 (*)     All other components within normal limits   TROPONIN I - Abnormal; Notable for the following components:    Troponin I 0.406 (*)     All other components within normal limits   CBC W/ AUTO DIFFERENTIAL   B-TYPE NATRIURETIC PEPTIDE   TYPE & SCREEN     EKG Readings: (Independently Interpreted)   Initial Reading: No STEMI. Rhythm: Normal Sinus Rhythm. Heart Rate: 88.   No ST segment elevation or depression concerning for acute ischemia. Relatively unchanged from prior, V2 changes may be lead placment       Imaging Results          X-Ray Chest AP Portable (Final result)  Result time 07/24/19 20:21:12    Final result by Jose Sandy MD (07/24/19 20:21:12)                 Impression:      No acute process.      Electronically signed by: Jose Sandy MD  Date:    07/24/2019  Time:    20:21             Narrative:    EXAMINATION:  XR CHEST AP PORTABLE    CLINICAL HISTORY:  Chest Pain;    TECHNIQUE:  Single frontal view of the chest was performed.    COMPARISON:  06/29/2019.    FINDINGS:  Monitoring EKG leads are present.  The trachea is unremarkable.  The cardiomediastinal silhouette is within normal limits.  The hilar structures are unremarkable.  The hemidiaphragms are within normal limits.  There is no evidence of free air beneath the hemidiaphragms.  There are no pleural effusions.  There is no evidence of a pneumothorax.  There is no evidence of pneumomediastinum.  No airspace opacity is present.  The osseous structures are unremarkable.                                 Medical Decision Making:   Initial Assessment:   79-year-old female with history of hypertension presenting  with midsternal left-sided chest pain associated with nausea, diaphoresis, shortness of breath.  Patient also notes decreased exercise tolerance pain unable to walk from her bedroom to the kitchen without stopping the liver room.  Pain was constant, occurred at rest today 3:00 a.m., though has since subsided.  On exam, patient well-appearing in no acute distress. Vitals normal.  EKG essentially unchanged from prior with possible T-wave inversion T-wave in V2, though could be lead placement.  Differential includes ACS, GERD, anxiety, less likely pneumonia, PE, pneumothorax.  Well's score 0.  Denies hemoptysis or lower extremity edema.  No risk factors for PE.  We will get labs, chest x-ray, and reassess.  Clinical Tests:   Lab Tests: Ordered and Reviewed  Radiological Study: Ordered and Reviewed  Medical Tests: Ordered and Reviewed  ED Management:  Chest x-ray normal.    Troponin elevated.  Will admit for NSTEMI, patient given full-dose Lovenox as well as Plavix and lobe aspirin given anaphylaxis allergy.            Scribe Attestation:   Scribe #1: I performed the above scribed service and the documentation accurately describes the services I performed. I attest to the accuracy of the note.            ED Course as of Jul 25 0017 Wed Jul 24, 2019 2120 Trop 0.4, c/w NSTEMI. Patient chest pain free. Allergic to ASA with severe anaphylaxis, confirmed by allergist. Will treat with 300mg plavix.    [GS]      ED Course User Index  [GS] Hank Bianchi MD     Clinical Impression:       ICD-10-CM ICD-9-CM   1. Chest pain R07.9 786.50         Disposition:   Disposition: Admitted  Condition: Stable           Scribe attestation: I, Hank Bianchi  , personally performed the services described in this documentation. All medical record entries made by the scribe were at my direction and in my presence.  I have reviewed the chart and agree that the record reflects my personal performance and is accurate and  complete.             Hank Bianchi MD  07/25/19 0018

## 2019-07-25 NOTE — HPI
79 y.o. female that (in part)  has a past medical history of Chronic kidney disease, Hypertension, and Thyroid disease.  has a past surgical history that includes Hysterectomy; Joint replacement; Abdominal surgery; Cholecystectomy; Revision of scar tissue rectus muscle; and Back surgery. Presents to [facility] Emergency Department complaining of chest pain.  Radiation to left shoulder and jaw.  Worsened with exertion.  Started 4 hr prior to arrival.  Sxs since include nausea, diaphoresis, shortness of breath, dyspnea with exertion.  She has been evaluated previously for similar symptoms.  In the emergency department routine laboratory studies, chest x-ray, EKG, cardiac enzymes were obtained.  Elevated troponin level.  ACS protocol initiated.  Being treated for non ST elevation MI.  Cardiology consulted.    The patient is a very pleasant 79-year-old woman with no prior cardiac history but does have a history of CKD 3.  She also has a history of aspirin allergy causing anaphylaxis.  The patient states that she spent a day and half in the intensive care unit previously after being given aspirin.  She now presents to the emergency room with complaints of chest discomfort which radiated up to her jaw.  Her troponin is elevated up to 0.5.  This on a background of creatinine of 1.4.  Her symptoms are persistent.  She denies any prior cardiac catheterization or stent placement.

## 2019-07-25 NOTE — ED TRIAGE NOTES
Pt is reporting intermittent chest pain since June 29th. Pain runs across the chest and into both shoulders and her jaw. Pt is crying and admits to having an anxiety problem. Pt is also reporting SOB on exertion

## 2019-07-25 NOTE — HPI
Tyra Soliz is a 79 y.o. female that (in part)  has a past medical history of Chronic kidney disease, Hypertension, and Thyroid disease.  has a past surgical history that includes Hysterectomy; Joint replacement; Abdominal surgery; Cholecystectomy; Revision of scar tissue rectus muscle; and Back surgery. Presents to [facility] Emergency Department complaining of chest pain.  Radiation to left shoulder and jaw.  Worsened with exertion.  Started March 4 hr prior to arrival.  Social since include nausea, diaphoresis, shortness of breath, dyspnea with exertion.  She has been evaluated previously for similar symptoms.    In the emergency department routine laboratory studies, chest x-ray, EKG, cardiac enzymes were obtained.  Elevated troponin level.  ACS protocol initiated.  Being treated for non ST elevation MI.  Cardiology consulted..    Hospital medicine has been asked to admit for further evaluation and treatment.

## 2019-07-26 VITALS
RESPIRATION RATE: 22 BRPM | SYSTOLIC BLOOD PRESSURE: 119 MMHG | OXYGEN SATURATION: 97 % | TEMPERATURE: 98 F | BODY MASS INDEX: 35.68 KG/M2 | WEIGHT: 209 LBS | HEIGHT: 64 IN | HEART RATE: 71 BPM | DIASTOLIC BLOOD PRESSURE: 59 MMHG

## 2019-07-26 PROBLEM — R07.9 CHEST PAIN: Status: RESOLVED | Noted: 2019-07-24 | Resolved: 2019-07-26

## 2019-07-26 PROBLEM — Z88.8 ASPIRIN ALLERGY: Status: RESOLVED | Noted: 2019-07-25 | Resolved: 2019-07-26

## 2019-07-26 LAB
ANION GAP SERPL CALC-SCNC: 8 MMOL/L (ref 8–16)
BASOPHILS # BLD AUTO: 0.02 K/UL (ref 0–0.2)
BASOPHILS NFR BLD: 0.2 % (ref 0–1.9)
BUN SERPL-MCNC: 22 MG/DL (ref 8–23)
CALCIUM SERPL-MCNC: 8.7 MG/DL (ref 8.7–10.5)
CHLORIDE SERPL-SCNC: 110 MMOL/L (ref 95–110)
CO2 SERPL-SCNC: 22 MMOL/L (ref 23–29)
CREAT SERPL-MCNC: 1.3 MG/DL (ref 0.5–1.4)
DIFFERENTIAL METHOD: ABNORMAL
EOSINOPHIL # BLD AUTO: 0.2 K/UL (ref 0–0.5)
EOSINOPHIL NFR BLD: 2.4 % (ref 0–8)
ERYTHROCYTE [DISTWIDTH] IN BLOOD BY AUTOMATED COUNT: 13.8 % (ref 11.5–14.5)
EST. GFR  (AFRICAN AMERICAN): 45 ML/MIN/1.73 M^2
EST. GFR  (NON AFRICAN AMERICAN): 39 ML/MIN/1.73 M^2
GLUCOSE SERPL-MCNC: 117 MG/DL (ref 70–110)
HCT VFR BLD AUTO: 38.8 % (ref 37–48.5)
HGB BLD-MCNC: 12.1 G/DL (ref 12–16)
LYMPHOCYTES # BLD AUTO: 1.7 K/UL (ref 1–4.8)
LYMPHOCYTES NFR BLD: 20.5 % (ref 18–48)
MCH RBC QN AUTO: 30.8 PG (ref 27–31)
MCHC RBC AUTO-ENTMCNC: 31.2 G/DL (ref 32–36)
MCV RBC AUTO: 99 FL (ref 82–98)
MONOCYTES # BLD AUTO: 0.8 K/UL (ref 0.3–1)
MONOCYTES NFR BLD: 9.1 % (ref 4–15)
NEUTROPHILS # BLD AUTO: 5.7 K/UL (ref 1.8–7.7)
NEUTROPHILS NFR BLD: 68 % (ref 38–73)
PLATELET # BLD AUTO: 224 K/UL (ref 150–350)
PMV BLD AUTO: 11.2 FL (ref 9.2–12.9)
POTASSIUM SERPL-SCNC: 3.8 MMOL/L (ref 3.5–5.1)
PTH-INTACT SERPL-MCNC: 74.3 PG/ML (ref 9–77)
RBC # BLD AUTO: 3.93 M/UL (ref 4–5.4)
SODIUM SERPL-SCNC: 140 MMOL/L (ref 136–145)
WBC # BLD AUTO: 8.34 K/UL (ref 3.9–12.7)

## 2019-07-26 PROCEDURE — 93010 ELECTROCARDIOGRAM REPORT: CPT | Mod: ,,, | Performed by: INTERNAL MEDICINE

## 2019-07-26 PROCEDURE — 25000003 PHARM REV CODE 250: Performed by: HOSPITALIST

## 2019-07-26 PROCEDURE — 25000003 PHARM REV CODE 250: Performed by: INTERNAL MEDICINE

## 2019-07-26 PROCEDURE — 93010 EKG 12-LEAD: ICD-10-PCS | Mod: ,,, | Performed by: INTERNAL MEDICINE

## 2019-07-26 PROCEDURE — 93005 ELECTROCARDIOGRAM TRACING: CPT

## 2019-07-26 PROCEDURE — 80048 BASIC METABOLIC PNL TOTAL CA: CPT

## 2019-07-26 PROCEDURE — 85025 COMPLETE CBC W/AUTO DIFF WBC: CPT

## 2019-07-26 PROCEDURE — 94761 N-INVAS EAR/PLS OXIMETRY MLT: CPT

## 2019-07-26 PROCEDURE — 36415 COLL VENOUS BLD VENIPUNCTURE: CPT

## 2019-07-26 RX ORDER — ASPIRIN 81 MG/1
81 TABLET ORAL DAILY
Refills: 0 | COMMUNITY
Start: 2019-07-26 | End: 2020-12-18

## 2019-07-26 RX ORDER — LISINOPRIL 5 MG/1
5 TABLET ORAL DAILY
Qty: 90 TABLET | Refills: 3 | Status: SHIPPED | OUTPATIENT
Start: 2019-07-27 | End: 2019-08-26

## 2019-07-26 RX ORDER — NITROGLYCERIN 0.4 MG/1
0.4 TABLET SUBLINGUAL EVERY 5 MIN PRN
Qty: 20 TABLET | Refills: 3 | Status: SHIPPED | OUTPATIENT
Start: 2019-07-26 | End: 2020-09-23

## 2019-07-26 RX ORDER — METOPROLOL TARTRATE 25 MG/1
12.5 TABLET, FILM COATED ORAL 2 TIMES DAILY
Qty: 30 TABLET | Refills: 11 | Status: SHIPPED | OUTPATIENT
Start: 2019-07-26 | End: 2020-12-18

## 2019-07-26 RX ORDER — DIPHENHYDRAMINE HCL 25 MG
25 CAPSULE ORAL ONCE
Status: COMPLETED | OUTPATIENT
Start: 2019-07-26 | End: 2019-07-26

## 2019-07-26 RX ORDER — ATORVASTATIN CALCIUM 80 MG/1
80 TABLET, FILM COATED ORAL DAILY
Qty: 90 TABLET | Refills: 3 | Status: SHIPPED | OUTPATIENT
Start: 2019-07-27 | End: 2020-09-23

## 2019-07-26 RX ORDER — CLOPIDOGREL BISULFATE 75 MG/1
75 TABLET ORAL DAILY
Qty: 30 TABLET | Refills: 11 | Status: SHIPPED | OUTPATIENT
Start: 2019-07-27 | End: 2019-08-26 | Stop reason: SDUPTHER

## 2019-07-26 RX ADMIN — METOPROLOL TARTRATE 12.5 MG: 25 TABLET, FILM COATED ORAL at 08:07

## 2019-07-26 RX ADMIN — LEVOTHYROXINE SODIUM 100 MCG: 100 TABLET ORAL at 05:07

## 2019-07-26 RX ADMIN — ATORVASTATIN CALCIUM 80 MG: 40 TABLET, FILM COATED ORAL at 08:07

## 2019-07-26 RX ADMIN — DIPHENHYDRAMINE HYDROCHLORIDE 25 MG: 25 CAPSULE ORAL at 07:07

## 2019-07-26 RX ADMIN — SERTRALINE HYDROCHLORIDE 100 MG: 50 TABLET ORAL at 08:07

## 2019-07-26 RX ADMIN — DOCUSATE SODIUM AND SENNOSIDES 1 TABLET: 8.6; 5 TABLET, FILM COATED ORAL at 08:07

## 2019-07-26 RX ADMIN — CLOPIDOGREL BISULFATE 75 MG: 75 TABLET ORAL at 08:07

## 2019-07-26 RX ADMIN — LISINOPRIL 5 MG: 5 TABLET ORAL at 08:07

## 2019-07-26 NOTE — PLAN OF CARE
07/26/19 0919   Discharge Assessment   Assessment Type Discharge Planning Assessment   Confirmed/corrected address and phone number on facesheet? Yes   Assessment information obtained from? Patient   Prior to hospitilization cognitive status: Alert/Oriented   Prior to hospitalization functional status: Independent   Current cognitive status: Alert/Oriented   Current Functional Status: Independent   Facility Arrived From: home   Lives With spouse   Able to Return to Prior Arrangements yes   Is patient able to care for self after discharge? Yes   Who are your caregiver(s) and their phone number(s)? erlinda [phone]   Patient's perception of discharge disposition home or selfcare   Readmission Within the Last 30 Days no previous admission in last 30 days   Patient currently being followed by outpatient case management? No   Patient currently receives any other outside agency services? No   Equipment Currently Used at Home walker, rolling   Do you have any problems affording any of your prescribed medications? No   Is the patient taking medications as prescribed? yes   Does the patient have transportation home? Yes   Transportation Anticipated car, drives self;family or friend will provide   Dialysis Name and Scheduled days N/A   Does the patient receive services at the [facility]? No   Discharge Plan A Home with family   Discharge Plan B Home with family   DME Needed Upon Discharge  none   Patient/Family in Agreement with Plan yes       PETER met with pt and pt's family at bedside. SW explained her role in Care Management. Pt voiced understanding. SW inquired about HELP AT HOME. Pt stated that she will have Erlinda at home to help for support. SW voiced understanding. SW inquired about responsibilities when it comes to  MANAGING HER/HIS HEALTH at home and what it entails. Pt inquired about details. SW informed pt of RESPONSIBILITIES of:    1. Follow up appointments  2. Getting Prescriptions filled  3. Taking  "medications as prescribed.     Pt voiced understanding.  SW explained "My Health Packet" blue folder and the pink and green tabs that are on the folder as well. Discharge Brochure given to pt with Care Team information. Pt voiced understanding.     Pt's pharmacy:   [id number] - [place] - [address]  [id number].  [place] [id number]  Phone: [phone] Fax: [phone]      Pt's preference for appointments:afternoon       "

## 2019-07-26 NOTE — DISCHARGE SUMMARY
[facility] Hospital Medicine  Discharge Summary      Patient Name: Tyra Soliz  MRN: [id number]  Admission Date: 7/24/2019  Hospital Length of Stay: 2 days  Discharge Date and Time:  07/26/2019 9:32 AM  Attending Physician: Dionisio Hart MD   Discharging Provider: Dionisio Hart MD  Primary Care Provider: Marii Bauer MD      HPI:     Tyra Soliz is a 79 y.o. female that (in part)  has a past medical history of Chronic kidney disease, Hypertension, and Thyroid disease.  has a past surgical history that includes Hysterectomy; Joint replacement; Abdominal surgery; Cholecystectomy; Revision of scar tissue rectus muscle; and Back surgery. Presents to [facility] Emergency Department complaining of chest pain.  Radiation to left shoulder and jaw.  Worsened with exertion.  Started March 4 hr prior to arrival.  Social since include nausea, diaphoresis, shortness of breath, dyspnea with exertion.  She has been evaluated previously for similar symptoms.    In the emergency department routine laboratory studies, chest x-ray, EKG, cardiac enzymes were obtained.  Elevated troponin level.  ACS protocol initiated.  Being treated for non ST elevation MI.  Cardiology consulted..    Hospital medicine has been asked to admit for further evaluation and treatment.     Procedure(s) (LRB):  Left heart cath R rad access (Left)  Percutaneous coronary intervention (N/A)      Hospital Course:   78 y/o female presented with chest pain.  In the emergency department routine laboratory studies, chest x-ray, EKG, cardiac enzymes were obtained.  Elevated troponin level.  ACS protocol initiated.  Being treated for non ST elevation MI.  Cardiology consulted.  Patient was taken to Cath lab on 7/25. Note that patient has serious ASA allergy with a history of anaphylaxis.  Patient underwent LHC showing 2 vessel CAD.  She underwent successful PCI of proximal LAD.  Patient was started on  Plavix and Statin.  Home Cardizem and Maxzide have been stopped and patient has been started on ACEI and B blocker.  Patient underwent aspirin desensitization protocol during hospital stay with no complications.  She will be discharged on ASA 81 mg.  Patient has remained afebrile and hemodynamically stable.  She is currently chest pain free.  She will be discharged home to follow up with PCP and Cardiology.     Consults:   Consults (From admission, onward)        Status Ordering Provider     Inpatient consult to Cardiology  Once     Provider:  Fidelia Yadav MD    Completed FIDELIA SPENCER          No new Assessment & Plan notes have been filed under this hospital service since the last note was generated.  Service: Hospital Medicine    Final Active Diagnoses:    Diagnosis Date Noted POA    PRINCIPAL PROBLEM:  NSTEMI (non-ST elevated myocardial infarction) [I21.4] 07/24/2019 Yes    Essential hypertension [I10] 07/24/2019 Yes    Hypothyroidism due to acquired atrophy of thyroid [E03.4] 03/20/2017 Yes    Chronic kidney disease, stage III (moderate) [N18.3] 03/20/2017 Yes      Problems Resolved During this Admission:    Diagnosis Date Noted Date Resolved POA    Aspirin allergy [Z88.8] 07/25/2019 07/26/2019 Not Applicable    Chest pain [R07.9] 07/24/2019 07/26/2019 Yes       Discharged Condition: stable    Disposition: Home or Self Care    Follow Up:  Follow-up Information     Marii Bauer MD In 1 week.    Specialty:  Internal Medicine  Contact information:  [address]  [facility] [address]  [phone]             Fidelia Yadav MD In 2 weeks.    Specialties:  Cardiology, INTERVENTIONAL CARDIOLOGY  Contact information:  [address] [facility] [address]  [address]  [facility] [id number] [phone]                 Patient Instructions:      Diet Cardiac     Notify your health care provider if you experience any of the following:  temperature >100.4     Notify your health care provider if you  experience any of the following:  persistent nausea and vomiting or diarrhea     Notify your health care provider if you experience any of the following:  severe uncontrolled pain     Notify your health care provider if you experience any of the following:  difficulty breathing or increased cough     Notify your health care provider if you experience any of the following:  persistent dizziness, light-headedness, or visual disturbances     Notify your health care provider if you experience any of the following:  increased confusion or weakness     Activity as tolerated         Pending Diagnostic Studies:     Procedure Component Value Units Date/Time    EKG 12-LEAD starting tomorrow [[id number]] Collected:  07/26/19 0156    Order Status:  Sent Lab Status:  In process Updated:  07/26/19 0622    Narrative:       Test Reason : I21.4,    Vent. Rate : 057 BPM     Atrial Rate : 057 BPM     P-R Int : 188 ms          QRS Dur : 078 ms      QT Int : 446 ms       P-R-T Axes : 061 054 092 degrees     QTc Int : 434 ms    Sinus bradycardia  Nonspecific T wave abnormality  Abnormal ECG  When compared with ECG of 25-JUL-2019 10:44,  No significant change was found    Referred By: [email or web address]   SELF           Confirmed By:          Medications:  Reconciled Home Medications:      Medication List      START taking these medications    aspirin 81 MG EC tablet  Commonly known as:  ECOTRIN  Take 1 tablet (81 mg total) by mouth once daily.     atorvastatin 80 MG tablet  Commonly known as:  LIPITOR  Take 1 tablet (80 mg total) by mouth once daily.  Start taking on:  7/27/2019     clopidogrel 75 mg tablet  Commonly known as:  PLAVIX  Take 1 tablet (75 mg total) by mouth once daily.  Start taking on:  7/27/2019     lisinopril 5 MG tablet  Commonly known as:  PRINIVIL,ZESTRIL  Take 1 tablet (5 mg total) by mouth once daily.  Start taking on:  7/27/2019     metoprolol tartrate 25 MG tablet  Commonly known as:  LOPRESSOR  Take 0.5 tablets (12.5 mg  total) by mouth 2 (two) times daily.     nitroGLYCERIN 0.4 MG SL tablet  Commonly known as:  NITROSTAT  Place 1 tablet (0.4 mg total) under the tongue every 5 (five) minutes as needed for Chest pain.        CONTINUE taking these medications    diphenhydrAMINE 25 mg capsule  Commonly known as:  BENADRYL  Take 1 each (25 mg total) by mouth every 6 (six) hours as needed for Itching.     furosemide 20 MG tablet  Commonly known as:  LASIX  Take 20 mg by mouth 2 (two) times daily.     HYDROcodone-acetaminophen 5-325 mg per tablet  Commonly known as:  NORCO  Take 1 tablet by mouth every 6 (six) hours as needed for Pain.     levothyroxine 100 MCG tablet  Commonly known as:  SYNTHROID  Take 100 mcg by mouth once daily.     meclizine 25 mg tablet  Commonly known as:  ANTIVERT  Take 1 tablet (25 mg total) by mouth 3 (three) times daily as needed for Dizziness.     oxyCODONE-acetaminophen 7.5-325 mg per tablet  Commonly known as:  PERCOCET  Take 1 tablet by mouth every 4 (four) hours as needed.     sertraline 100 MG tablet  Commonly known as:  ZOLOFT  Take 100 mg by mouth once daily.     VITAMIN D2 50,000 unit Cap  Generic drug:  ergocalciferol  Take 50,000 Units by mouth every 7 days. Taking on Monday's        STOP taking these medications    diltiaZEM 180 MG 24 hr capsule  Commonly known as:  CARDIZEM CD     triamterene-hydrochlorothiazide 37.5-25 mg 37.5-25 mg per tablet  Commonly known as:  MAXZIDE-25            Indwelling Lines/Drains at time of discharge:   Lines/Drains/Airways          None          Time spent on the discharge of patient: >30 minutes  Patient was seen and examined on the date of discharge and determined to be suitable for discharge.      Dionisio Hart MD  Department of Hospital Medicine  [facility]

## 2019-07-26 NOTE — PLAN OF CARE
"   07/26/19 1118   Final Note   Assessment Type Final Discharge Note   Anticipated Discharge Disposition Home   Hospital Follow Up  Appt(s) scheduled? Yes   Discharge plans and expectations educations in teach back method with documentation complete? Yes       EDUCATION:  Pt provided with educational information on " chest pains ".  Information reviewed and placed in :My Healthcare Packet" to be brought home for pt to use as resource after discharge.  Information included:  signs and symptoms to look for and call the doctor if experiencing, and symptoms that may indicate a medical emergency: CALL 911.      All questions answered.  Teach back method used.  Pt  verbalized understanding of all information by stating, "  That he is aware of the signs and symptoms to watch for and when to contact MD and when to report to the ED immediately. Also SW took this time to ask pt to provide at least 3 symptoms. Pt stated she knows the signs.       PETER informed Nurse Aragon that the pt was cleared from case management.   "

## 2019-07-26 NOTE — PLAN OF CARE
Problem: Adult Inpatient Plan of Care  Goal: Plan of Care Review  Outcome: Ongoing (interventions implemented as appropriate)  Pt remains free from falls and injuries. Aggrostat gtts stopped. Pt repositioned independently. Plan of care discussed with pt. VSS, no acute issues overnight.

## 2019-07-26 NOTE — PROGRESS NOTES
[facility] CASE MANAGEMENT                  WRITTEN DISCHARGE INFORMATION      APPOINTMENTS AND RESOURCES TO HELP YOU MANAGE YOUR CARE AT HOME BASED ON YOUR PREFERENCES:  (If an appointment is not scheduled for you when you leave the hospital, call your doctor to schedule a follow up visit within a week)    Follow-up Information     Marii Bauer MD On 8/6/2019.    Specialty:  Internal Medicine  Why:  Outpatient Services @11:30am   Contact information:  [address]  [facility] [address]  [phone]             Carlos Alberto Yadav MD On 8/26/2019.    Specialties:  Cardiology, INTERVENTIONAL CARDIOLOGY  Why:  Outpatient Services 8:40am Nurse will add pt to the waiting list for earlier appt.  Contact information:  [address] [facility] [address]  [address]  [place] [address]  [phone]                   Healthy Living Instructions to HELP MANAGE YOUR CARE AT HOME:  Things You are responsible for:  1.    Getting your prescriptions filled   2.    Taking your medications as directed, DO NOT MISS ANY DOSES!  3.    Following the diet and exercise recommended by your doctor  4.    Going to your follow-up doctor appointment. This is important because it allows the doctor to monitor your progress and determine if any changes need to made to your treatment plan.  5. If you have any questions about MANAGING YOUR CARE AT HOME Call the Nurse Care Line for 24/7 Assistance [phone]       Please answer any calls you may receive from [facility]. We want to continue to support you as you manage your healthcare needs. [facility] is happy to have the opportunity to serve you.      Thank you for choosing [facility] for your healthcare needs!  Your [facility] Case Management Team,

## 2019-07-26 NOTE — NURSING
All discharge instructions given, pt verbalized understanding, VSS, RA,afebrile, pt denied any pain at present time, pt ready to be transported home.

## 2019-07-29 ENCOUNTER — PATIENT OUTREACH (OUTPATIENT)
Dept: ADMINISTRATIVE | Facility: CLINIC | Age: 79
End: 2019-07-29

## 2019-07-29 NOTE — PATIENT INSTRUCTIONS
Discharge Instructions for Heart Attack  You have had a heart attack. Also known as acute myocardial infarction, or AMI, a heart attack occurs when a vessel supplying the heart with blood suddenly becomes blocked. Follow these guidelines for home care and lifestyle changes.  Home Care  Take your medications exactly as directed. Dont skip doses.  Remember that recovery after a heart attack takes time. Plan to rest for at least 4-8 weeks while you recover. Then return to normal activity when your doctor says its okay.  Ask your doctor about joining a heart rehabilitation program.  Tell your doctor if you are feeling depressed. Feelings of sadness are common after a heart attack, but it is important that you speak to someone if you are feeling overwhelmed by these feelings.  If you are having chest pain, call 911 for an ambulance. Do NOT drive yourself to the hospital.  Ask your family members to learn CPR.  Learn to take your own blood pressure and pulse. Keep a record of your results. Ask your doctor when you should seek emergency medical attention. He or she will tell you which blood pressure reading is dangerous.  Lifestyle Changes  Maintain a healthy weight. Get help to lose any extra pounds.  Cut back on salt.  Limit canned, dried, packaged, and fast foods.  Dont add salt to your food.  Season foods with herbs instead of salt when you cook.  Break the smoking habit. Enroll in a stop-smoking program to improve your chances of success.  Limit fatty foods.  Check your lipid levels regularly. (Your doctor can show you how to do this.)  Build up your activity according to your doctors recommendation.  Ask your doctor when its okay to resume sexual activity.  Tell your doctor about any erectile dysfunction (ED) medication you are taking. Some ED medications are not safe if you take certain heart medications.  Try to manage stress.  Follow-Up  Make a follow-up appointment as directed by our staff.    When to Seek  Medical Attention  Call 911 right away if you have:  Chest pain that is not relieved by medication.  Shortness of breath.  Otherwise, call your doctor immediately if you have:  Lightheadedness, dizziness, or fainting.  Feeling of irregular heartbeat or fast pulse.   © 2329-2979 [employer], [address] [place] [address]. All rights reserved. This information is not intended as a substitute for professional medical care. Always follow your healthcare professional's instructions.

## 2019-08-26 ENCOUNTER — OFFICE VISIT (OUTPATIENT)
Dept: CARDIOLOGY | Facility: CLINIC | Age: 79
End: 2019-08-26
Payer: MEDICARE

## 2019-08-26 VITALS
RESPIRATION RATE: 15 BRPM | HEART RATE: 56 BPM | OXYGEN SATURATION: 99 % | HEIGHT: 64 IN | WEIGHT: 216.06 LBS | DIASTOLIC BLOOD PRESSURE: 64 MMHG | SYSTOLIC BLOOD PRESSURE: 112 MMHG | BODY MASS INDEX: 36.89 KG/M2

## 2019-08-26 DIAGNOSIS — I10 ESSENTIAL HYPERTENSION: ICD-10-CM

## 2019-08-26 DIAGNOSIS — I25.119 CORONARY ARTERY DISEASE INVOLVING NATIVE CORONARY ARTERY OF NATIVE HEART WITH ANGINA PECTORIS: Primary | ICD-10-CM

## 2019-08-26 DIAGNOSIS — N18.30 CHRONIC KIDNEY DISEASE, STAGE III (MODERATE): ICD-10-CM

## 2019-08-26 DIAGNOSIS — I73.9 CLAUDICATION OF BOTH LOWER EXTREMITIES: ICD-10-CM

## 2019-08-26 DIAGNOSIS — E66.9 NON MORBID OBESITY, UNSPECIFIED OBESITY TYPE: ICD-10-CM

## 2019-08-26 DIAGNOSIS — I21.4 NSTEMI (NON-ST ELEVATED MYOCARDIAL INFARCTION): ICD-10-CM

## 2019-08-26 PROCEDURE — 99999 PR PBB SHADOW E&M-EST. PATIENT-LVL III: ICD-10-PCS | Mod: PBBFAC,,, | Performed by: INTERNAL MEDICINE

## 2019-08-26 PROCEDURE — 99214 OFFICE O/P EST MOD 30 MIN: CPT | Mod: S$GLB,,, | Performed by: INTERNAL MEDICINE

## 2019-08-26 PROCEDURE — 99999 PR PBB SHADOW E&M-EST. PATIENT-LVL III: CPT | Mod: PBBFAC,,, | Performed by: INTERNAL MEDICINE

## 2019-08-26 PROCEDURE — 1101F PR PT FALLS ASSESS DOC 0-1 FALLS W/OUT INJ PAST YR: ICD-10-PCS | Mod: CPTII,S$GLB,, | Performed by: INTERNAL MEDICINE

## 2019-08-26 PROCEDURE — 99214 PR OFFICE/OUTPT VISIT, EST, LEVL IV, 30-39 MIN: ICD-10-PCS | Mod: S$GLB,,, | Performed by: INTERNAL MEDICINE

## 2019-08-26 PROCEDURE — 1101F PT FALLS ASSESS-DOCD LE1/YR: CPT | Mod: CPTII,S$GLB,, | Performed by: INTERNAL MEDICINE

## 2019-08-26 RX ORDER — CLOPIDOGREL BISULFATE 75 MG/1
75 TABLET ORAL DAILY
Qty: 90 TABLET | Refills: 3 | Status: SHIPPED | OUTPATIENT
Start: 2019-08-26 | End: 2020-09-10 | Stop reason: SDUPTHER

## 2019-08-26 NOTE — PROGRESS NOTES
CARDIOVASCULAR PROGRESS NOTE    REASON FOR CONSULT:   Tyra Soliz is a 79 y.o. female who presents for follow up of CAD/NSTEMI/PCI.    PCP: Nilesh  HISTORY OF PRESENT ILLNESS:   The patient returns for follow-up of her recent hospitalization for non STEMI and LAD PCI.  She tells me she has been tolerating her aspirin without any recurrent anaphylactic symptoms after her desensitization stay in the ICU.  She has also been taking her Plavix without any complications.  She does continue to describe exertional angina as well as dyspnea exertion.  She also complains of bilateral calf claudication of approximately 1/2 block duration.  There has been no palpitations, lightheadedness, dizziness, or syncope.  She denies PND, orthopnea, melena, or hematuria.    CARDIOVASCULAR HISTORY:   CAD 7/25/19 NSTEMI PCI prox LAD 2.75x26 Resolute [place] ROBERTO    Hx ASA allergy s/p desensitization    PAST MEDICAL HISTORY:     Past Medical History:   Diagnosis Date    Chronic kidney disease     stage 3     Coronary artery disease     Hypertension     Thyroid disease        PAST SURGICAL HISTORY:     Past Surgical History:   Procedure Laterality Date    ABDOMINAL SURGERY      removal scar tissue abdomen    BACK SURGERY      x3    CHOLECYSTECTOMY      CORONARY ANGIOPLASTY  07/25/2019    prox LAD 2.75x26 Resolute [place] ROBERTO    HYSTERECTOMY      JOINT REPLACEMENT      Rt total knee    Left heart cath R rad access Left 7/25/2019    Performed by [facility] Alber[facility] Yadav MD at [facility] CATH LAB    Percutaneous coronary intervention N/A 7/25/2019    Performed by [facility]av MD at [facility] CATH LAB    REVISION OF SCAR TISSUE RECTUS MUSCLE      after gall bladder surgery    REVISION-ARTHROPLASTY-KNEE-TOTAL Right 1/9/2017    Performed by Sav Green MD at [facility] OR       ALLERGIES AND MEDICATION:     Review of patient's allergies indicates:   Allergen Reactions    Aspirin Anaphylaxis    Keflex [cephalexin] Itching     Moxifloxacin Itching    Penicillins Hives    Codeine Nausea Only        Medication List           Accurate as of 8/26/19  9:30 AM. If you have any questions, ask your nurse or doctor.               CONTINUE taking these medications    aspirin 81 MG EC tablet  Commonly known as:  ECOTRIN  Take 1 tablet (81 mg total) by mouth once daily.     atorvastatin 80 MG tablet  Commonly known as:  LIPITOR  Take 1 tablet (80 mg total) by mouth once daily.     clopidogrel 75 mg tablet  Commonly known as:  PLAVIX  Take 1 tablet (75 mg total) by mouth once daily.     diphenhydrAMINE 25 mg capsule  Commonly known as:  BENADRYL  Take 1 each (25 mg total) by mouth every 6 (six) hours as needed for Itching.     furosemide 20 MG tablet  Commonly known as:  LASIX     HYDROcodone-acetaminophen 5-325 mg per tablet  Commonly known as:  NORCO     levothyroxine 100 MCG tablet  Commonly known as:  SYNTHROID     lisinopril 5 MG tablet  Commonly known as:  PRINIVIL,ZESTRIL  Take 1 tablet (5 mg total) by mouth once daily.     meclizine 25 mg tablet  Commonly known as:  ANTIVERT  Take 1 tablet (25 mg total) by mouth 3 (three) times daily as needed for Dizziness.     metoprolol tartrate 25 MG tablet  Commonly known as:  LOPRESSOR  Take 0.5 tablets (12.5 mg total) by mouth 2 (two) times daily.     nitroGLYCERIN 0.4 MG SL tablet  Commonly known as:  NITROSTAT  Place 1 tablet (0.4 mg total) under the tongue every 5 (five) minutes as needed for Chest pain.     sertraline 100 MG tablet  Commonly known as:  ZOLOFT     VITAMIN D2 50,000 unit Cap  Generic drug:  ergocalciferol            SOCIAL HISTORY:     Social History     Socioeconomic History    Marital status:      Spouse name: Not on file    Number of children: Not on file    Years of education: Not on file    Highest education level: Not on file   Occupational History    Not on file   Social Needs    Financial resource strain: Not on file    Food insecurity:     Worry: Not on file      Inability: Not on file    Transportation needs:     Medical: Not on file     Non-medical: Not on file   Tobacco Use    Smoking status: Former Smoker     Last attempt to quit:      Years since quittin.6    Smokeless tobacco: Never Used   Substance and Sexual Activity    Alcohol use: No    Drug use: No    Sexual activity: Never     Partners: Male   Lifestyle    Physical activity:     Days per week: Not on file     Minutes per session: Not on file    Stress: Not on file   Relationships    Social connections:     Talks on phone: Not on file     Gets together: Not on file     Attends Scientologist service: Not on file     Active member of club or organization: Not on file     Attends meetings of clubs or organizations: Not on file     Relationship status: Not on file   Other Topics Concern    Not on file   Social History Narrative    Not on file       FAMILY HISTORY:   History reviewed. No pertinent family history.    REVIEW OF SYSTEMS:   Review of Systems   Constitutional: Negative for chills, diaphoresis and fever.   HENT: Negative for nosebleeds.    Eyes: Negative for blurred vision, double vision and photophobia.   Respiratory: Positive for shortness of breath. Negative for hemoptysis and wheezing.    Cardiovascular: Positive for chest pain and claudication. Negative for palpitations, orthopnea, leg swelling and PND.   Gastrointestinal: Negative for abdominal pain, blood in stool, heartburn, melena, nausea and vomiting.   Genitourinary: Negative for flank pain and hematuria.   Musculoskeletal: Negative for falls, myalgias and neck pain.   Skin: Negative for rash.   Neurological: Negative for dizziness, seizures, loss of consciousness, weakness and headaches.   Endo/Heme/Allergies: Negative for polydipsia. Does not bruise/bleed easily.   Psychiatric/Behavioral: Negative for depression and memory loss. The patient is not nervous/anxious.        PHYSICAL EXAM:     Vitals:    19 0855   BP:  "112/64   Pulse: (!) 56   Resp: 15    Body mass index is 37.09 kg/m².  Weight: 98 kg (216 lb 0.8 oz)   Height: 5' 4" (162.6 cm)     Physical Exam   Constitutional: She is oriented to person, place, and time. She appears well-developed and well-nourished. She is cooperative.  Non-toxic appearance. No distress.   HENT:   Head: Normocephalic and atraumatic.   Eyes: Pupils are equal, round, and reactive to light. Conjunctivae and EOM are normal. No scleral icterus.   Neck: Trachea normal and normal range of motion. Neck supple. Normal carotid pulses and no JVD present. Carotid bruit is not present. No neck rigidity. No tracheal deviation and no edema present. No thyromegaly present.   Cardiovascular: Normal rate, regular rhythm, S1 normal and S2 normal. PMI is not displaced. Exam reveals distant heart sounds. Exam reveals no gallop and no friction rub.   No murmur heard.  Pulses:       Carotid pulses are 2+ on the right side, and 2+ on the left side.  R rad access site c/d/i   Pulmonary/Chest: Effort normal and breath sounds normal. No stridor. No respiratory distress. She has no wheezes. She has no rales. She exhibits no tenderness.   Abdominal: Soft. She exhibits no distension. There is no hepatosplenomegaly.   obese   Musculoskeletal: Normal range of motion. She exhibits no edema or tenderness.   Feet:   Right Foot:   Skin Integrity: Negative for ulcer.   Left Foot:   Skin Integrity: Negative for ulcer.   Neurological: She is alert and oriented to person, place, and time. No cranial nerve deficit.   Skin: Skin is warm and dry. No rash noted. No erythema.   Psychiatric: She has a normal mood and affect. Her speech is normal and behavior is normal.   Vitals reviewed.      DATA:   EKG: (personally reviewed tracing)  7/24/19 SR 88, PRWP, sept TWI (new), inflat ST abnl (new) vs 6/29/19    Laboratory:  CBC:  Recent Labs   Lab 07/24/19 2014 07/25/19  0257 07/26/19  0151   WBC 7.76 8.92 8.34   Hemoglobin 13.2 13.2 12.1 "   Hematocrit 41.2 41.6 38.8   Platelets 274 298 224       CHEMISTRIES:  Recent Labs   Lab 07/24/19 2014 07/25/19  0257 07/26/19  0151   Glucose 220 H 111 H 117 H   Sodium 141 141 140   Potassium 3.7 4.1 3.8   BUN, Bld 22 21 22   Creatinine 1.5 H 1.4 1.3   eGFR if  38 A 41 A 45 A   eGFR if non  33 A 36 A 39 A   Calcium 10.6 H 10.8 H 8.7       CARDIAC BIOMARKERS:  Recent Labs   Lab 07/24/19 2014 07/24/19  2255 07/25/19  0257   Troponin I 0.425 H 0.406 H 0.542 H       COAGS:  Recent Labs   Lab 04/16/18  1730   INR 1.0       LIPIDS/LFTS:  Recent Labs   Lab 04/16/18  1730 06/29/19  1801 07/24/19 2014 07/25/19  0257   Cholesterol  --   --   --  178   Triglycerides  --   --   --  71   HDL  --   --   --  54   LDL Cholesterol  --   --   --  109.8   Non-HDL Cholesterol  --   --   --  124   AST 16 21 21  --    ALT 13 12 17  --        Cardiovascular Testing:  Cath 7/25/19  LVEDP: 10mmHg  LVEF: prelim normal by echo  Dominance: Right  LM: normal  LAD: prox 90%  LCx: normal  RCA: prox-mid 40%  PCI prox LAD:  Preop Plavix/enox, intraop heparin/aggrastat (ASA allegry noted)  Predil 2.5x15  Stent 2.75x26 Resolute [place] ROBERTO  Postdil prox 3.0x12 NC 20 janelle  Excellent result, T3 flow, 0% residual stenosis  Hemostasis:  R Radial band  Impression:  NSTEMI  2V CAD, normal LV fxn  ASA allergy noted  Successful PCI prox LAD 2.75x26 Resolute [place] ROBERTO  R rad vasband for hemostasis  Plan:  Observe in ICU  Aggrastat gtt x12 hrs  ASA desentization protocol  Cont ASA 81mg qd indefinitely thereafter  Plavix 75mg qd for 1 year (thru 7/2020)  BBl/ACE/Statin  Home in am  Follow up with Dr. Yadav 2 weeks  Outpat cardiac rehab referral  Outpat MPI for assessment of RCA stenosis    Echo: 7/25/19  · Normal left ventricular systolic function. The estimated ejection fraction is 60%  · No wall motion abnormalities.  · Normal right ventricular systolic function.        ASSESSMENT:   # CAD/NSTEMI 7/25/19 s/p PCI prox LAD  2.75x26 Resolute [place] ROBERTO, residual RCA stenosis noted.  Pt with persistent angina and NDIAYE.  # Hx ASA allergy s/p successful desensitization 7/25/19  # HLP on atorva 80mg  # HTN, controlled, actually complaining of LH  # BMI 37  # CKD3  # bilat calf claudication.    PLAN:   Cont med rx  ASA 81mg qd indefinitely (pt warned to avoid stopping ASA as she may resensitize)  Plavix 75mg qd for 1 year (thru 7/2020)  BBl/Statin  Stop lisinopril  LE art US/BEE  Ex MPI for assessment of RCA stenosis and persistent anginal sxs  RTC 1 month  Eventual cardiac rehab referral  Check lipids/LFT 2 months (late Oct 2019)      Carlos Alberto Yadav MD, FACC

## 2019-09-12 ENCOUNTER — HOSPITAL ENCOUNTER (OUTPATIENT)
Dept: CARDIOLOGY | Facility: HOSPITAL | Age: 79
Discharge: HOME | End: 2019-09-12
Attending: INTERNAL MEDICINE
Payer: MEDICARE

## 2019-09-12 ENCOUNTER — HOSPITAL ENCOUNTER (OUTPATIENT)
Dept: RADIOLOGY | Facility: HOSPITAL | Age: 79
Discharge: HOME | End: 2019-09-12
Attending: INTERNAL MEDICINE
Payer: MEDICARE

## 2019-09-12 DIAGNOSIS — I73.9 CLAUDICATION OF BOTH LOWER EXTREMITIES: ICD-10-CM

## 2019-09-12 DIAGNOSIS — I21.4 NSTEMI (NON-ST ELEVATED MYOCARDIAL INFARCTION): ICD-10-CM

## 2019-09-12 DIAGNOSIS — I25.119 CORONARY ARTERY DISEASE INVOLVING NATIVE CORONARY ARTERY OF NATIVE HEART WITH ANGINA PECTORIS: ICD-10-CM

## 2019-09-12 LAB
CV STRESS BASE HR: 54 BPM
DIASTOLIC BLOOD PRESSURE: 65 MMHG
LEFT ANT TIBIAL SYS PSV: 97 CM/S
LEFT CFA PSV: 150 CM/S
LEFT EXTERNAL ILIAC PSV: 225 CM/S
LEFT PERONEAL SYS PSV: 72 CM/S
LEFT POPLITEAL PSV: 100 CM/S
LEFT POST TIBIAL SYS PSV: 122 CM/S
LEFT PROFUNDA SYS PSV: 97 CM/S
LEFT SUPER FEMORAL DIST SYS PSV: 131 CM/S
LEFT SUPER FEMORAL MID SYS PSV: 88 CM/S
LEFT SUPER FEMORAL OSTIAL SYS PSV: 138 CM/S
LEFT SUPER FEMORAL PROX SYS PSV: 106 CM/S
LEFT TIB/PER TRUNK SYS PSV: 103 CM/S
NUC STRESS EJECTION FRACTION: 72 %
OHS CV CPX 85 PERCENT MAX PREDICTED HEART RATE MALE: 115
OHS CV CPX MAX PREDICTED HEART RATE: 136
OHS CV CPX PATIENT IS FEMALE: 1
OHS CV CPX PATIENT IS MALE: 0
OHS CV CPX PEAK DIASTOLIC BLOOD PRESSURE: 62 MMHG
OHS CV CPX PEAK HEAR RATE: 74 BPM
OHS CV CPX PEAK RATE PRESSURE PRODUCT: 8140
OHS CV CPX PEAK SYSTOLIC BLOOD PRESSURE: 110 MMHG
OHS CV CPX PERCENT MAX PREDICTED HEART RATE ACHIEVED: 55
OHS CV CPX RATE PRESSURE PRODUCT PRESENTING: 7128
OHS CV LEFT LOWER EXTREMITY ABI (NO CALC): 1.06
OHS CV RIGHT ABI LOWER EXTREMITY (NO CALC): 1.06
RIGHT ANT TIBIAL SYS PSV: 94 CM/S
RIGHT CFA PSV: 197 CM/S
RIGHT EXTERNAL ILLIAC PSV: 179 CM/S
RIGHT PERONEAL SYS PSV: 75 CM/S
RIGHT POPLITEAL PSV: 106 CM/S
RIGHT POST TIBIAL SYS PSV: 91 CM/S
RIGHT PROFUNDA SYS PSV: 84 CM/S
RIGHT SUPER FEMORAL DIST SYS PSV: 88 CM/S
RIGHT SUPER FEMORAL MID SYS PSV: 141 CM/S
RIGHT SUPER FEMORAL OSTIAL SYS PSV: 138 CM/S
RIGHT SUPER FEMORAL PROX SYS PSV: 141 CM/S
RIGHT TIB/PER TRUNK SYS PSV: 111 CM/S
STRESS ECHO TARGET HR: 119 BPM
SYSTOLIC BLOOD PRESSURE: 132 MMHG

## 2019-09-12 PROCEDURE — 93925 LOWER EXTREMITY STUDY: CPT | Mod: 26,,, | Performed by: INTERNAL MEDICINE

## 2019-09-12 PROCEDURE — 93018 CV STRESS TEST I&R ONLY: CPT | Mod: ,,, | Performed by: INTERNAL MEDICINE

## 2019-09-12 PROCEDURE — 78452 HT MUSCLE IMAGE SPECT MULT: CPT | Mod: 26,,, | Performed by: INTERNAL MEDICINE

## 2019-09-12 PROCEDURE — 93016 STRESS TEST WITH MYOCARDIAL PERFUSION (CUPID ONLY): ICD-10-PCS | Mod: ,,, | Performed by: INTERNAL MEDICINE

## 2019-09-12 PROCEDURE — 93018 STRESS TEST WITH MYOCARDIAL PERFUSION (CUPID ONLY): ICD-10-PCS | Mod: ,,, | Performed by: INTERNAL MEDICINE

## 2019-09-12 PROCEDURE — 93016 CV STRESS TEST SUPVJ ONLY: CPT | Mod: ,,, | Performed by: INTERNAL MEDICINE

## 2019-09-12 PROCEDURE — 93017 CV STRESS TEST TRACING ONLY: CPT

## 2019-09-12 PROCEDURE — A9502 TC99M TETROFOSMIN: HCPCS

## 2019-09-12 PROCEDURE — 78452 STRESS TEST WITH MYOCARDIAL PERFUSION (CUPID ONLY): ICD-10-PCS | Mod: 26,,, | Performed by: INTERNAL MEDICINE

## 2019-09-12 PROCEDURE — 93925 LOWER EXTREMITY STUDY: CPT | Mod: 50

## 2019-09-12 PROCEDURE — 93925 CV US DOPPLER ARTERIAL LEGS BILATERAL (CUPID ONLY): ICD-10-PCS | Mod: 26,,, | Performed by: INTERNAL MEDICINE

## 2019-09-12 PROCEDURE — 63600175 PHARM REV CODE 636 W HCPCS: Performed by: INTERNAL MEDICINE

## 2019-09-12 RX ORDER — REGADENOSON 0.08 MG/ML
0.4 INJECTION, SOLUTION INTRAVENOUS ONCE
Status: COMPLETED | OUTPATIENT
Start: 2019-09-12 | End: 2019-09-12

## 2019-09-12 RX ADMIN — REGADENOSON 0.4 MG: 0.08 INJECTION, SOLUTION INTRAVENOUS at 08:09

## 2019-09-17 ENCOUNTER — TELEPHONE (OUTPATIENT)
Dept: CARDIOLOGY | Facility: CLINIC | Age: 79
End: 2019-09-17

## 2019-09-23 ENCOUNTER — TELEPHONE (OUTPATIENT)
Dept: CARDIOLOGY | Facility: CLINIC | Age: 79
End: 2019-09-23

## 2019-10-08 ENCOUNTER — OFFICE VISIT (OUTPATIENT)
Dept: CARDIOLOGY | Facility: CLINIC | Age: 79
End: 2019-10-08
Payer: MEDICARE

## 2019-10-08 VITALS
HEART RATE: 58 BPM | DIASTOLIC BLOOD PRESSURE: 74 MMHG | WEIGHT: 212.06 LBS | RESPIRATION RATE: 15 BRPM | BODY MASS INDEX: 36.2 KG/M2 | HEIGHT: 64 IN | OXYGEN SATURATION: 97 % | SYSTOLIC BLOOD PRESSURE: 116 MMHG

## 2019-10-08 DIAGNOSIS — R94.39 ABNORMAL NUCLEAR STRESS TEST: ICD-10-CM

## 2019-10-08 DIAGNOSIS — N18.30 STAGE 3 CHRONIC KIDNEY DISEASE: ICD-10-CM

## 2019-10-08 DIAGNOSIS — E78.2 MIXED HYPERLIPIDEMIA: ICD-10-CM

## 2019-10-08 DIAGNOSIS — R06.09 DOE (DYSPNEA ON EXERTION): ICD-10-CM

## 2019-10-08 DIAGNOSIS — I25.119 CORONARY ARTERY DISEASE INVOLVING NATIVE CORONARY ARTERY OF NATIVE HEART WITH ANGINA PECTORIS: Primary | ICD-10-CM

## 2019-10-08 DIAGNOSIS — I10 ESSENTIAL HYPERTENSION: ICD-10-CM

## 2019-10-08 PROCEDURE — 99215 OFFICE O/P EST HI 40 MIN: CPT | Mod: S$GLB,,, | Performed by: INTERNAL MEDICINE

## 2019-10-08 PROCEDURE — 1101F PT FALLS ASSESS-DOCD LE1/YR: CPT | Mod: CPTII,S$GLB,, | Performed by: INTERNAL MEDICINE

## 2019-10-08 PROCEDURE — 99215 PR OFFICE/OUTPT VISIT, EST, LEVL V, 40-54 MIN: ICD-10-PCS | Mod: S$GLB,,, | Performed by: INTERNAL MEDICINE

## 2019-10-08 PROCEDURE — 99999 PR PBB SHADOW E&M-EST. PATIENT-LVL III: ICD-10-PCS | Mod: PBBFAC,,, | Performed by: INTERNAL MEDICINE

## 2019-10-08 PROCEDURE — 1101F PR PT FALLS ASSESS DOC 0-1 FALLS W/OUT INJ PAST YR: ICD-10-PCS | Mod: CPTII,S$GLB,, | Performed by: INTERNAL MEDICINE

## 2019-10-08 PROCEDURE — 99499 RISK ADDL DX/OHS AUDIT: ICD-10-PCS | Mod: S$GLB,,, | Performed by: INTERNAL MEDICINE

## 2019-10-08 PROCEDURE — 99499 UNLISTED E&M SERVICE: CPT | Mod: S$GLB,,, | Performed by: INTERNAL MEDICINE

## 2019-10-08 PROCEDURE — 99999 PR PBB SHADOW E&M-EST. PATIENT-LVL III: CPT | Mod: PBBFAC,,, | Performed by: INTERNAL MEDICINE

## 2019-10-08 RX ORDER — SODIUM CHLORIDE 9 MG/ML
3 INJECTION, SOLUTION INTRAVENOUS CONTINUOUS
Status: CANCELLED | OUTPATIENT
Start: 2019-10-15 | End: 2019-10-15

## 2019-10-08 NOTE — H&P (VIEW-ONLY)
CARDIOVASCULAR PROGRESS NOTE    REASON FOR CONSULT:   Tyra Soliz is a 79 y.o. female who presents for follow up of CAD/NSTEMI/PCI, testing.    PCP: Nilesh  HISTORY OF PRESENT ILLNESS:   The patient returns for follow-up.  In the interim since her last office visit, she underwent nuclear stress testing noting normal LV function, and possible anterior ischemia on my personal review of the scintigraphic images.  She continues to complain of dyspnea on exertion and associated angina of a class 3 nature.  She has had no symptoms at rest.  She denies any palpitations, lightheadedness, dizziness, or syncope.  There has been no PND, orthopnea, or lower extremity edema.  She denies melena, hematuria, or claudicant symptoms.  She appears to be tolerating her dual antiplatelet therapy without complications.    CARDIOVASCULAR HISTORY:   CAD 7/25/19 NSTEMI PCI prox LAD 2.75x26 Resolute [place] ROBERTO    Hx ASA allergy s/p desensitization    PAST MEDICAL HISTORY:     Past Medical History:   Diagnosis Date    Chronic kidney disease     stage 3     Coronary artery disease     Hypertension     Thyroid disease        PAST SURGICAL HISTORY:     Past Surgical History:   Procedure Laterality Date    ABDOMINAL SURGERY      removal scar tissue abdomen    BACK SURGERY      x3    CHOLECYSTECTOMY      CORONARY ANGIOPLASTY  07/25/2019    prox LAD 2.75x26 Resolute [place] ROBERTO    HYSTERECTOMY      JOINT REPLACEMENT      Rt total knee    LEFT HEART CATHETERIZATION Left 7/25/2019    Procedure: Left heart cath R rad access;  Surgeon: Carlos Alberto Yadav MD;  Location: [facility] CATH LAB;  Service: Cardiology;  Laterality: Left;    REVISION OF SCAR TISSUE RECTUS MUSCLE      after gall bladder surgery       ALLERGIES AND MEDICATION:     Review of patient's allergies indicates:   Allergen Reactions    Keflex [cephalexin] Itching    Moxifloxacin Itching    Penicillins Hives    Codeine Nausea Only        Medication List           Accurate  as of 2019  3:08 PM. If you have any questions, ask your nurse or doctor.               CONTINUE taking these medications    aspirin 81 MG EC tablet  Commonly known as:  ECOTRIN  Take 1 tablet (81 mg total) by mouth once daily.     atorvastatin 80 MG tablet  Commonly known as:  LIPITOR  Take 1 tablet (80 mg total) by mouth once daily.     clopidogrel 75 mg tablet  Commonly known as:  PLAVIX  Take 1 tablet (75 mg total) by mouth once daily.     diphenhydrAMINE 25 mg capsule  Commonly known as:  BENADRYL  Take 1 each (25 mg total) by mouth every 6 (six) hours as needed for Itching.     furosemide 20 MG tablet  Commonly known as:  LASIX     HYDROcodone-acetaminophen 5-325 mg per tablet  Commonly known as:  NORCO     levothyroxine 100 MCG tablet  Commonly known as:  SYNTHROID     meclizine 25 mg tablet  Commonly known as:  ANTIVERT  Take 1 tablet (25 mg total) by mouth 3 (three) times daily as needed for Dizziness.     metoprolol tartrate 25 MG tablet  Commonly known as:  LOPRESSOR  Take 0.5 tablets (12.5 mg total) by mouth 2 (two) times daily.     nitroGLYCERIN 0.4 MG SL tablet  Commonly known as:  NITROSTAT  Place 1 tablet (0.4 mg total) under the tongue every 5 (five) minutes as needed for Chest pain.     sertraline 100 MG tablet  Commonly known as:  ZOLOFT     VITAMIN D2 50,000 unit Cap  Generic drug:  ergocalciferol            SOCIAL HISTORY:     Social History     Socioeconomic History    Marital status:      Spouse name: Not on file    Number of children: Not on file    Years of education: Not on file    Highest education level: Not on file   Occupational History    Not on file   Social Needs    Financial resource strain: Not on file    Food insecurity:     Worry: Not on file     Inability: Not on file    Transportation needs:     Medical: Not on file     Non-medical: Not on file   Tobacco Use    Smoking status: Former Smoker     Last attempt to quit:      Years since quittin.7  "   Smokeless tobacco: Never Used   Substance and Sexual Activity    Alcohol use: No    Drug use: No    Sexual activity: Never     Partners: Male   Lifestyle    Physical activity:     Days per week: Not on file     Minutes per session: Not on file    Stress: Not on file   Relationships    Social connections:     Talks on phone: Not on file     Gets together: Not on file     Attends Mandaeism service: Not on file     Active member of club or organization: Not on file     Attends meetings of clubs or organizations: Not on file     Relationship status: Not on file   Other Topics Concern    Not on file   Social History Narrative    Not on file       FAMILY HISTORY:   History reviewed. No pertinent family history.    REVIEW OF SYSTEMS:   Review of Systems   Constitutional: Negative for chills, diaphoresis and fever.   HENT: Negative for nosebleeds.    Eyes: Negative for blurred vision, double vision and photophobia.   Respiratory: Positive for shortness of breath. Negative for hemoptysis and wheezing.    Cardiovascular: Positive for chest pain. Negative for palpitations, orthopnea, claudication, leg swelling and PND.   Gastrointestinal: Negative for abdominal pain, blood in stool, heartburn, melena, nausea and vomiting.   Genitourinary: Negative for flank pain and hematuria.   Musculoskeletal: Negative for falls, myalgias and neck pain.   Skin: Negative for rash.   Neurological: Negative for dizziness, seizures, loss of consciousness, weakness and headaches.   Endo/Heme/Allergies: Negative for polydipsia. Does not bruise/bleed easily.   Psychiatric/Behavioral: Negative for depression and memory loss. The patient is not nervous/anxious.        PHYSICAL EXAM:     Vitals:    10/08/19 1443   BP: 116/74   Pulse: (!) 58   Resp: 15    Body mass index is 36.4 kg/m².  Weight: 96.2 kg (212 lb 1.3 oz)   Height: 5' 4" (162.6 cm)     Physical Exam   Constitutional: She is oriented to person, place, and time. She appears " well-developed and well-nourished. She is cooperative.  Non-toxic appearance. No distress.   HENT:   Head: Normocephalic and atraumatic.   Eyes: Pupils are equal, round, and reactive to light. Conjunctivae and EOM are normal. No scleral icterus.   Neck: Trachea normal and normal range of motion. Neck supple. Normal carotid pulses and no JVD present. Carotid bruit is not present. No neck rigidity. No tracheal deviation and no edema present. No thyromegaly present.   Cardiovascular: Normal rate, regular rhythm, S1 normal and S2 normal. PMI is not displaced. Exam reveals distant heart sounds. Exam reveals no gallop and no friction rub.   No murmur heard.  Pulses:       Carotid pulses are 2+ on the right side, and 2+ on the left side.       Radial pulses are 2+ on the right side.   Pulmonary/Chest: Effort normal and breath sounds normal. No stridor. No respiratory distress. She has no wheezes. She has no rales. She exhibits no tenderness.   Abdominal: Soft. She exhibits no distension. There is no hepatosplenomegaly.   obese   Musculoskeletal: Normal range of motion. She exhibits no edema or tenderness.   Feet:   Right Foot:   Skin Integrity: Negative for ulcer.   Left Foot:   Skin Integrity: Negative for ulcer.   Neurological: She is alert and oriented to person, place, and time. No cranial nerve deficit.   Skin: Skin is warm and dry. No rash noted. No erythema.   Psychiatric: She has a normal mood and affect. Her speech is normal and behavior is normal.   Vitals reviewed.      DATA:   EKG: (personally reviewed tracing)  7/24/19 SR 88, PRWP, sept TWI (new), inflat ST abnl (new) vs 6/29/19    Laboratory:  CBC:  Recent Labs   Lab 07/24/19 2014 07/25/19 0257 07/26/19  0151   WBC 7.76 8.92 8.34   Hemoglobin 13.2 13.2 12.1   Hematocrit 41.2 41.6 38.8   Platelets 274 298 224       CHEMISTRIES:  Recent Labs   Lab 07/24/19 2014 07/25/19 0257 07/26/19  0151   Glucose 220 H 111 H 117 H   Sodium 141 141 140   Potassium 3.7 4.1  3.8   BUN, Bld 22 21 22   Creatinine 1.5 H 1.4 1.3   eGFR if  38 A 41 A 45 A   eGFR if non  33 A 36 A 39 A   Calcium 10.6 H 10.8 H 8.7       CARDIAC BIOMARKERS:  Recent Labs   Lab 07/24/19 2014 07/24/19  2255 07/25/19  0257   Troponin I 0.425 H 0.406 H 0.542 H       COAGS:  Recent Labs   Lab 04/16/18  1730   INR 1.0       LIPIDS/LFTS:  Recent Labs   Lab 04/16/18  1730 06/29/19  1801 07/24/19 2014 07/25/19  0257   Cholesterol  --   --   --  178   Triglycerides  --   --   --  71   HDL  --   --   --  54   LDL Cholesterol  --   --   --  109.8   Non-HDL Cholesterol  --   --   --  124   AST 16 21 21  --    ALT 13 12 17  --        Cardiovascular Testing:  L MPI 9/12/19 (?ant isch on my pers review of images)    The perfusion scan is free of evidence from myocardial ischemia or injury.    There is a mild intensity defect in the anteroapical wall of the left ventricle, secondary to breast attenuation.    Gated perfusion images showed an ejection fraction of 72 % post stress.    The EKG portion of this study is negative for ischemia.    There were no arrhythmias during stress.    The patient reported no chest pain during the stress test.    LE art US/BEE 9/12/19  No evidence of hemodynamically significant infrainguinal PAD bilaterally.  Biphasic waveforms throughout.  Normal BEE bilaterally.    Cath 7/25/19 (images personally reviewed and interpreted)  LVEDP: 10mmHg  LVEF: prelim normal by echo  Dominance: Right  LM: normal  LAD: prox 90%  LCx: normal  RCA: prox-mid 40%  PCI prox LAD:  Preop Plavix/enox, intraop heparin/aggrastat (ASA allegry noted)  Predil 2.5x15  Stent 2.75x26 Resolute [place] ROBERTO  Postdil prox 3.0x12 NC 20 janelle  Excellent result, T3 flow, 0% residual stenosis  Hemostasis:  R Radial band  Impression:  NSTEMI  2V CAD, normal LV fxn  ASA allergy noted  Successful PCI prox LAD 2.75x26 Resolute [place] ROBERTO  R rad vasband for hemostasis  Plan:  Observe in ICU  Aggrastat gtt x12  hrs  ASA desentization protocol  Cont ASA 81mg qd indefinitely thereafter  Plavix 75mg qd for 1 year (thru 7/2020)  BBl/ACE/Statin  Home in am  Follow up with Dr. Yadav 2 weeks  Outpat cardiac rehab referral  Outpat MPI for assessment of RCA stenosis    Echo: 7/25/19  · Normal left ventricular systolic function. The estimated ejection fraction is 60%  · No wall motion abnormalities.  · Normal right ventricular systolic function.    ASSESSMENT:   # CAD/NSTEMI 7/25/19 s/p PCI prox LAD 2.75x26 Resolute [place] ROBERTO, residual RCA stenosis noted.  Pt with persistent angina and NDIAYE, class III.  MPI 9/2019 with anterior ischemia.  # Hx ASA allergy s/p successful desensitization 7/25/19  # HLP on atorva 80mg  # HTN, controlled   # BMI 36, down 1 unit since last OV  # CKD3  # bilat calf claudication, LE art US/BEE 9/2019 normal.    PLAN:   Cont med rx  ASA 81mg qd indefinitely (pt warned to avoid stopping ASA as she may resensitize)  Plavix 75mg qd for 1 year (thru 7/2020)  Cath 10/15/19 10am, R rad access  Eventual cardiac rehab referral  Check lipids/LFT with precath labs  RTC post cath    Risks, benefits and alternatives of the catheterization procedure were discussed with the patient which include but are not limited to: bleeding, infection, death, heart attack, arrhythmia, kidney failure, stroke, need for emergency surgery, etc.  Patient understands and and agrees to proceed.  Consent was placed on the chart.        Carlos Alberto Yadav MD, FACC

## 2019-10-08 NOTE — PROGRESS NOTES
CARDIOVASCULAR PROGRESS NOTE    REASON FOR CONSULT:   Tyra Soliz is a 79 y.o. female who presents for follow up of CAD/NSTEMI/PCI, testing.    PCP: Nilesh  HISTORY OF PRESENT ILLNESS:   The patient returns for follow-up.  In the interim since her last office visit, she underwent nuclear stress testing noting normal LV function, and possible anterior ischemia on my personal review of the scintigraphic images.  She continues to complain of dyspnea on exertion and associated angina of a class 3 nature.  She has had no symptoms at rest.  She denies any palpitations, lightheadedness, dizziness, or syncope.  There has been no PND, orthopnea, or lower extremity edema.  She denies melena, hematuria, or claudicant symptoms.  She appears to be tolerating her dual antiplatelet therapy without complications.    CARDIOVASCULAR HISTORY:   CAD 7/25/19 NSTEMI PCI prox LAD 2.75x26 Resolute [place] ROBERTO    Hx ASA allergy s/p desensitization    PAST MEDICAL HISTORY:     Past Medical History:   Diagnosis Date    Chronic kidney disease     stage 3     Coronary artery disease     Hypertension     Thyroid disease        PAST SURGICAL HISTORY:     Past Surgical History:   Procedure Laterality Date    ABDOMINAL SURGERY      removal scar tissue abdomen    BACK SURGERY      x3    CHOLECYSTECTOMY      CORONARY ANGIOPLASTY  07/25/2019    prox LAD 2.75x26 Resolute [place] ROBERTO    HYSTERECTOMY      JOINT REPLACEMENT      Rt total knee    LEFT HEART CATHETERIZATION Left 7/25/2019    Procedure: Left heart cath R rad access;  Surgeon: Carlos Alberto Yadav MD;  Location: [facility] CATH LAB;  Service: Cardiology;  Laterality: Left;    REVISION OF SCAR TISSUE RECTUS MUSCLE      after gall bladder surgery       ALLERGIES AND MEDICATION:     Review of patient's allergies indicates:   Allergen Reactions    Keflex [cephalexin] Itching    Moxifloxacin Itching    Penicillins Hives    Codeine Nausea Only        Medication List           Accurate  as of 2019  3:08 PM. If you have any questions, ask your nurse or doctor.               CONTINUE taking these medications    aspirin 81 MG EC tablet  Commonly known as:  ECOTRIN  Take 1 tablet (81 mg total) by mouth once daily.     atorvastatin 80 MG tablet  Commonly known as:  LIPITOR  Take 1 tablet (80 mg total) by mouth once daily.     clopidogrel 75 mg tablet  Commonly known as:  PLAVIX  Take 1 tablet (75 mg total) by mouth once daily.     diphenhydrAMINE 25 mg capsule  Commonly known as:  BENADRYL  Take 1 each (25 mg total) by mouth every 6 (six) hours as needed for Itching.     furosemide 20 MG tablet  Commonly known as:  LASIX     HYDROcodone-acetaminophen 5-325 mg per tablet  Commonly known as:  NORCO     levothyroxine 100 MCG tablet  Commonly known as:  SYNTHROID     meclizine 25 mg tablet  Commonly known as:  ANTIVERT  Take 1 tablet (25 mg total) by mouth 3 (three) times daily as needed for Dizziness.     metoprolol tartrate 25 MG tablet  Commonly known as:  LOPRESSOR  Take 0.5 tablets (12.5 mg total) by mouth 2 (two) times daily.     nitroGLYCERIN 0.4 MG SL tablet  Commonly known as:  NITROSTAT  Place 1 tablet (0.4 mg total) under the tongue every 5 (five) minutes as needed for Chest pain.     sertraline 100 MG tablet  Commonly known as:  ZOLOFT     VITAMIN D2 50,000 unit Cap  Generic drug:  ergocalciferol            SOCIAL HISTORY:     Social History     Socioeconomic History    Marital status:      Spouse name: Not on file    Number of children: Not on file    Years of education: Not on file    Highest education level: Not on file   Occupational History    Not on file   Social Needs    Financial resource strain: Not on file    Food insecurity:     Worry: Not on file     Inability: Not on file    Transportation needs:     Medical: Not on file     Non-medical: Not on file   Tobacco Use    Smoking status: Former Smoker     Last attempt to quit:      Years since quittin.7  "   Smokeless tobacco: Never Used   Substance and Sexual Activity    Alcohol use: No    Drug use: No    Sexual activity: Never     Partners: Male   Lifestyle    Physical activity:     Days per week: Not on file     Minutes per session: Not on file    Stress: Not on file   Relationships    Social connections:     Talks on phone: Not on file     Gets together: Not on file     Attends Taoist service: Not on file     Active member of club or organization: Not on file     Attends meetings of clubs or organizations: Not on file     Relationship status: Not on file   Other Topics Concern    Not on file   Social History Narrative    Not on file       FAMILY HISTORY:   History reviewed. No pertinent family history.    REVIEW OF SYSTEMS:   Review of Systems   Constitutional: Negative for chills, diaphoresis and fever.   HENT: Negative for nosebleeds.    Eyes: Negative for blurred vision, double vision and photophobia.   Respiratory: Positive for shortness of breath. Negative for hemoptysis and wheezing.    Cardiovascular: Positive for chest pain. Negative for palpitations, orthopnea, claudication, leg swelling and PND.   Gastrointestinal: Negative for abdominal pain, blood in stool, heartburn, melena, nausea and vomiting.   Genitourinary: Negative for flank pain and hematuria.   Musculoskeletal: Negative for falls, myalgias and neck pain.   Skin: Negative for rash.   Neurological: Negative for dizziness, seizures, loss of consciousness, weakness and headaches.   Endo/Heme/Allergies: Negative for polydipsia. Does not bruise/bleed easily.   Psychiatric/Behavioral: Negative for depression and memory loss. The patient is not nervous/anxious.        PHYSICAL EXAM:     Vitals:    10/08/19 1443   BP: 116/74   Pulse: (!) 58   Resp: 15    Body mass index is 36.4 kg/m².  Weight: 96.2 kg (212 lb 1.3 oz)   Height: 5' 4" (162.6 cm)     Physical Exam   Constitutional: She is oriented to person, place, and time. She appears " well-developed and well-nourished. She is cooperative.  Non-toxic appearance. No distress.   HENT:   Head: Normocephalic and atraumatic.   Eyes: Pupils are equal, round, and reactive to light. Conjunctivae and EOM are normal. No scleral icterus.   Neck: Trachea normal and normal range of motion. Neck supple. Normal carotid pulses and no JVD present. Carotid bruit is not present. No neck rigidity. No tracheal deviation and no edema present. No thyromegaly present.   Cardiovascular: Normal rate, regular rhythm, S1 normal and S2 normal. PMI is not displaced. Exam reveals distant heart sounds. Exam reveals no gallop and no friction rub.   No murmur heard.  Pulses:       Carotid pulses are 2+ on the right side, and 2+ on the left side.       Radial pulses are 2+ on the right side.   Pulmonary/Chest: Effort normal and breath sounds normal. No stridor. No respiratory distress. She has no wheezes. She has no rales. She exhibits no tenderness.   Abdominal: Soft. She exhibits no distension. There is no hepatosplenomegaly.   obese   Musculoskeletal: Normal range of motion. She exhibits no edema or tenderness.   Feet:   Right Foot:   Skin Integrity: Negative for ulcer.   Left Foot:   Skin Integrity: Negative for ulcer.   Neurological: She is alert and oriented to person, place, and time. No cranial nerve deficit.   Skin: Skin is warm and dry. No rash noted. No erythema.   Psychiatric: She has a normal mood and affect. Her speech is normal and behavior is normal.   Vitals reviewed.      DATA:   EKG: (personally reviewed tracing)  7/24/19 SR 88, PRWP, sept TWI (new), inflat ST abnl (new) vs 6/29/19    Laboratory:  CBC:  Recent Labs   Lab 07/24/19 2014 07/25/19 0257 07/26/19  0151   WBC 7.76 8.92 8.34   Hemoglobin 13.2 13.2 12.1   Hematocrit 41.2 41.6 38.8   Platelets 274 298 224       CHEMISTRIES:  Recent Labs   Lab 07/24/19 2014 07/25/19 0257 07/26/19  0151   Glucose 220 H 111 H 117 H   Sodium 141 141 140   Potassium 3.7 4.1  3.8   BUN, Bld 22 21 22   Creatinine 1.5 H 1.4 1.3   eGFR if  38 A 41 A 45 A   eGFR if non  33 A 36 A 39 A   Calcium 10.6 H 10.8 H 8.7       CARDIAC BIOMARKERS:  Recent Labs   Lab 07/24/19 2014 07/24/19  2255 07/25/19  0257   Troponin I 0.425 H 0.406 H 0.542 H       COAGS:  Recent Labs   Lab 04/16/18  1730   INR 1.0       LIPIDS/LFTS:  Recent Labs   Lab 04/16/18  1730 06/29/19  1801 07/24/19 2014 07/25/19  0257   Cholesterol  --   --   --  178   Triglycerides  --   --   --  71   HDL  --   --   --  54   LDL Cholesterol  --   --   --  109.8   Non-HDL Cholesterol  --   --   --  124   AST 16 21 21  --    ALT 13 12 17  --        Cardiovascular Testing:  L MPI 9/12/19 (?ant isch on my pers review of images)    The perfusion scan is free of evidence from myocardial ischemia or injury.    There is a mild intensity defect in the anteroapical wall of the left ventricle, secondary to breast attenuation.    Gated perfusion images showed an ejection fraction of 72 % post stress.    The EKG portion of this study is negative for ischemia.    There were no arrhythmias during stress.    The patient reported no chest pain during the stress test.    LE art US/BEE 9/12/19  No evidence of hemodynamically significant infrainguinal PAD bilaterally.  Biphasic waveforms throughout.  Normal BEE bilaterally.    Cath 7/25/19 (images personally reviewed and interpreted)  LVEDP: 10mmHg  LVEF: prelim normal by echo  Dominance: Right  LM: normal  LAD: prox 90%  LCx: normal  RCA: prox-mid 40%  PCI prox LAD:  Preop Plavix/enox, intraop heparin/aggrastat (ASA allegry noted)  Predil 2.5x15  Stent 2.75x26 Resolute [place] ROBERTO  Postdil prox 3.0x12 NC 20 janelle  Excellent result, T3 flow, 0% residual stenosis  Hemostasis:  R Radial band  Impression:  NSTEMI  2V CAD, normal LV fxn  ASA allergy noted  Successful PCI prox LAD 2.75x26 Resolute [place] ROBERTO  R rad vasband for hemostasis  Plan:  Observe in ICU  Aggrastat gtt x12  hrs  ASA desentization protocol  Cont ASA 81mg qd indefinitely thereafter  Plavix 75mg qd for 1 year (thru 7/2020)  BBl/ACE/Statin  Home in am  Follow up with Dr. Yadav 2 weeks  Outpat cardiac rehab referral  Outpat MPI for assessment of RCA stenosis    Echo: 7/25/19  · Normal left ventricular systolic function. The estimated ejection fraction is 60%  · No wall motion abnormalities.  · Normal right ventricular systolic function.    ASSESSMENT:   # CAD/NSTEMI 7/25/19 s/p PCI prox LAD 2.75x26 Resolute [place] ROBERTO, residual RCA stenosis noted.  Pt with persistent angina and NDIAYE, class III.  MPI 9/2019 with anterior ischemia.  # Hx ASA allergy s/p successful desensitization 7/25/19  # HLP on atorva 80mg  # HTN, controlled   # BMI 36, down 1 unit since last OV  # CKD3  # bilat calf claudication, LE art US/BEE 9/2019 normal.    PLAN:   Cont med rx  ASA 81mg qd indefinitely (pt warned to avoid stopping ASA as she may resensitize)  Plavix 75mg qd for 1 year (thru 7/2020)  Cath 10/15/19 10am, R rad access  Eventual cardiac rehab referral  Check lipids/LFT with precath labs  RTC post cath    Risks, benefits and alternatives of the catheterization procedure were discussed with the patient which include but are not limited to: bleeding, infection, death, heart attack, arrhythmia, kidney failure, stroke, need for emergency surgery, etc.  Patient understands and and agrees to proceed.  Consent was placed on the chart.        Carlos Alberto Yadav MD, FACC

## 2019-10-09 NOTE — PROGRESS NOTES
Patient, Tyra Soliz (MRN #[id number]), presented with a recorded BMI of 36.4 kg/m^2 and a documented comorbidity(s):  - Hypertension  - Hyperlipidemia  to which the severe obesity is a contributing factor. This is consistent with the definition of severe obesity (BMI 35.0-39.9) with comorbidity (ICD-10 E66.01, Z68.35). The patient's severe obesity was monitored, evaluated, addressed and/or treated. This addendum to the medical record is made on 10/09/2019.

## 2019-10-11 ENCOUNTER — HOSPITAL ENCOUNTER (OUTPATIENT)
Dept: PREADMISSION TESTING | Facility: HOSPITAL | Age: 79
Discharge: HOME | End: 2019-10-11
Attending: INTERNAL MEDICINE
Payer: MEDICARE

## 2019-10-11 VITALS
SYSTOLIC BLOOD PRESSURE: 120 MMHG | HEART RATE: 54 BPM | OXYGEN SATURATION: 97 % | DIASTOLIC BLOOD PRESSURE: 41 MMHG | HEIGHT: 64 IN | WEIGHT: 211 LBS | RESPIRATION RATE: 18 BRPM | TEMPERATURE: 97 F | BODY MASS INDEX: 36.02 KG/M2

## 2019-10-11 DIAGNOSIS — I25.119 CORONARY ARTERY DISEASE INVOLVING NATIVE CORONARY ARTERY OF NATIVE HEART WITH ANGINA PECTORIS: ICD-10-CM

## 2019-10-11 DIAGNOSIS — R94.39 ABNORMAL NUCLEAR STRESS TEST: ICD-10-CM

## 2019-10-11 DIAGNOSIS — R06.09 DOE (DYSPNEA ON EXERTION): ICD-10-CM

## 2019-10-11 LAB
ALBUMIN SERPL BCP-MCNC: 4 G/DL (ref 3.5–5.2)
ALP SERPL-CCNC: 95 U/L (ref 55–135)
ALT SERPL W/O P-5'-P-CCNC: 15 U/L (ref 10–44)
ANION GAP SERPL CALC-SCNC: 9 MMOL/L (ref 8–16)
AST SERPL-CCNC: 17 U/L (ref 10–40)
BASOPHILS # BLD AUTO: 0.04 K/UL (ref 0–0.2)
BASOPHILS NFR BLD: 0.5 % (ref 0–1.9)
BILIRUB DIRECT SERPL-MCNC: 0.3 MG/DL (ref 0.1–0.3)
BILIRUB SERPL-MCNC: 0.5 MG/DL (ref 0.1–1)
BUN SERPL-MCNC: 34 MG/DL (ref 8–23)
CALCIUM SERPL-MCNC: 10.6 MG/DL (ref 8.7–10.5)
CHLORIDE SERPL-SCNC: 106 MMOL/L (ref 95–110)
CHOLEST SERPL-MCNC: 120 MG/DL (ref 120–199)
CHOLEST/HDLC SERPL: 2 {RATIO} (ref 2–5)
CO2 SERPL-SCNC: 26 MMOL/L (ref 23–29)
CREAT SERPL-MCNC: 1.5 MG/DL (ref 0.5–1.4)
DIFFERENTIAL METHOD: ABNORMAL
EOSINOPHIL # BLD AUTO: 0.2 K/UL (ref 0–0.5)
EOSINOPHIL NFR BLD: 2.2 % (ref 0–8)
ERYTHROCYTE [DISTWIDTH] IN BLOOD BY AUTOMATED COUNT: 14.6 % (ref 11.5–14.5)
EST. GFR  (AFRICAN AMERICAN): 38 ML/MIN/1.73 M^2
EST. GFR  (NON AFRICAN AMERICAN): 33 ML/MIN/1.73 M^2
GLUCOSE SERPL-MCNC: 99 MG/DL (ref 70–110)
HCT VFR BLD AUTO: 41 % (ref 37–48.5)
HDLC SERPL-MCNC: 60 MG/DL (ref 40–75)
HDLC SERPL: 50 % (ref 20–50)
HGB BLD-MCNC: 12.8 G/DL (ref 12–16)
IMM GRANULOCYTES # BLD AUTO: 0.02 K/UL (ref 0–0.04)
IMM GRANULOCYTES NFR BLD AUTO: 0.3 % (ref 0–0.5)
INR PPP: 1 (ref 0.8–1.2)
LDLC SERPL CALC-MCNC: 50 MG/DL (ref 63–159)
LYMPHOCYTES # BLD AUTO: 2 K/UL (ref 1–4.8)
LYMPHOCYTES NFR BLD: 25.1 % (ref 18–48)
MCH RBC QN AUTO: 30 PG (ref 27–31)
MCHC RBC AUTO-ENTMCNC: 31.2 G/DL (ref 32–36)
MCV RBC AUTO: 96 FL (ref 82–98)
MONOCYTES # BLD AUTO: 0.6 K/UL (ref 0.3–1)
MONOCYTES NFR BLD: 7.8 % (ref 4–15)
NEUTROPHILS # BLD AUTO: 5 K/UL (ref 1.8–7.7)
NEUTROPHILS NFR BLD: 64.1 % (ref 38–73)
NONHDLC SERPL-MCNC: 60 MG/DL
NRBC BLD-RTO: 0 /100 WBC
PLATELET # BLD AUTO: 238 K/UL (ref 150–350)
PMV BLD AUTO: 10.9 FL (ref 9.2–12.9)
POTASSIUM SERPL-SCNC: 4.8 MMOL/L (ref 3.5–5.1)
PROT SERPL-MCNC: 7.5 G/DL (ref 6–8.4)
PROTHROMBIN TIME: 10 SEC (ref 9–12.5)
RBC # BLD AUTO: 4.26 M/UL (ref 4–5.4)
SODIUM SERPL-SCNC: 141 MMOL/L (ref 136–145)
TRIGL SERPL-MCNC: 50 MG/DL (ref 30–150)
WBC # BLD AUTO: 7.81 K/UL (ref 3.9–12.7)

## 2019-10-11 PROCEDURE — 80061 LIPID PANEL: CPT

## 2019-10-11 PROCEDURE — 85025 COMPLETE CBC W/AUTO DIFF WBC: CPT

## 2019-10-11 PROCEDURE — 80076 HEPATIC FUNCTION PANEL: CPT

## 2019-10-11 PROCEDURE — 85610 PROTHROMBIN TIME: CPT

## 2019-10-11 PROCEDURE — 80048 BASIC METABOLIC PNL TOTAL CA: CPT

## 2019-10-11 NOTE — DISCHARGE INSTRUCTIONS
"Your angiogram is scheduled for__10/15/2019_____________    Call [phone] between 2pm and 5pm on __10/14/2019__________to find out your arrival time for the day of your procedure.    Report to Same Day Surgery Unit at ____ AM on the 2nd floor of the hospital.  Use the front entrance of the hospital.  The front doors of the hospital open promptly at 5:30am.  If you need wheelchair assistance, call [phone] from your cell phone, or call "0" from the courtesy phone in the lobby.    IMPORTANT INSTRUCTIONS:  Do not eat or drink anything after midnight.  This includes water and coffee.  It is okay to brush your teeth.  Do not chew gum or have hard candy or mints.    Take your morning medications as instructed with small sips of water.       Shower the night before your procedure and the morning of your procedure with Hibiclens as directed.     Do not wear make-up.     You may wear deodorant, but do not wear body lotion, powder or perfume/cologne       Do not wear jewelry.     You will be asked to remove any dentures or partials for the procedure.     You do not need money or valuables.  You may bring your cell phone.     If you are going home the same day, you must arrange for someone to drive you home.     Wear comfortable, loose fitting clothes.      Call your doctor if you have sickness or fever before your angiogram.     Our number in [facility] is [phone] if you need to contact us.     If you are going home the same day, you must arrange for a family member or a friend to drive you home.  Public transportation is prohibited.  "

## 2019-10-15 ENCOUNTER — HOSPITAL ENCOUNTER (OUTPATIENT)
Facility: HOSPITAL | Age: 79
Discharge: HOME | End: 2019-10-15
Attending: INTERNAL MEDICINE | Admitting: INTERNAL MEDICINE
Payer: MEDICARE

## 2019-10-15 VITALS
HEIGHT: 64 IN | RESPIRATION RATE: 14 BRPM | DIASTOLIC BLOOD PRESSURE: 57 MMHG | SYSTOLIC BLOOD PRESSURE: 115 MMHG | HEART RATE: 50 BPM | BODY MASS INDEX: 36.02 KG/M2 | WEIGHT: 211 LBS | OXYGEN SATURATION: 93 % | TEMPERATURE: 98 F

## 2019-10-15 DIAGNOSIS — R06.09 DOE (DYSPNEA ON EXERTION): ICD-10-CM

## 2019-10-15 DIAGNOSIS — R94.39 ABNORMAL NUCLEAR STRESS TEST: ICD-10-CM

## 2019-10-15 DIAGNOSIS — I25.119 CORONARY ARTERY DISEASE INVOLVING NATIVE CORONARY ARTERY OF NATIVE HEART WITH ANGINA PECTORIS: Primary | ICD-10-CM

## 2019-10-15 PROCEDURE — 93571 PR HEART FLOW RESERV MEASURE,INIT VESSL: ICD-10-PCS | Mod: 26,52,RC, | Performed by: INTERNAL MEDICINE

## 2019-10-15 PROCEDURE — 93458 L HRT ARTERY/VENTRICLE ANGIO: CPT | Mod: 26,,, | Performed by: INTERNAL MEDICINE

## 2019-10-15 PROCEDURE — 93571 IV DOP VEL&/PRESS C FLO 1ST: CPT | Mod: 74,RC | Performed by: INTERNAL MEDICINE

## 2019-10-15 PROCEDURE — 25000003 PHARM REV CODE 250: Performed by: INTERNAL MEDICINE

## 2019-10-15 PROCEDURE — 63600175 PHARM REV CODE 636 W HCPCS: Performed by: INTERNAL MEDICINE

## 2019-10-15 PROCEDURE — 99152 MOD SED SAME PHYS/QHP 5/>YRS: CPT | Performed by: INTERNAL MEDICINE

## 2019-10-15 PROCEDURE — 99152 MOD SED SAME PHYS/QHP 5/>YRS: CPT | Mod: ,,, | Performed by: INTERNAL MEDICINE

## 2019-10-15 PROCEDURE — 99152 PR MOD CONSCIOUS SEDATION, SAME PHYS, 5+ YRS, FIRST 15 MIN: ICD-10-PCS | Mod: ,,, | Performed by: INTERNAL MEDICINE

## 2019-10-15 PROCEDURE — C1894 INTRO/SHEATH, NON-LASER: HCPCS | Performed by: INTERNAL MEDICINE

## 2019-10-15 PROCEDURE — 93571 IV DOP VEL&/PRESS C FLO 1ST: CPT | Mod: 26,52,RC, | Performed by: INTERNAL MEDICINE

## 2019-10-15 PROCEDURE — 93458 L HRT ARTERY/VENTRICLE ANGIO: CPT | Performed by: INTERNAL MEDICINE

## 2019-10-15 PROCEDURE — 93458 PR CATH PLACE/CORON ANGIO, IMG SUPER/INTERP,W LEFT HEART VENTRICULOGRAPHY: ICD-10-PCS | Mod: 26,,, | Performed by: INTERNAL MEDICINE

## 2019-10-15 PROCEDURE — C1769 GUIDE WIRE: HCPCS | Performed by: INTERNAL MEDICINE

## 2019-10-15 PROCEDURE — 99153 MOD SED SAME PHYS/QHP EA: CPT | Performed by: INTERNAL MEDICINE

## 2019-10-15 PROCEDURE — C1887 CATHETER, GUIDING: HCPCS | Performed by: INTERNAL MEDICINE

## 2019-10-15 PROCEDURE — 25500020 PHARM REV CODE 255: Performed by: INTERNAL MEDICINE

## 2019-10-15 RX ORDER — ATROPINE SULFATE 0.1 MG/ML
0.5 INJECTION INTRAVENOUS
Status: DISCONTINUED | OUTPATIENT
Start: 2019-10-15 | End: 2019-10-15 | Stop reason: HOSPADM

## 2019-10-15 RX ORDER — ONDANSETRON 2 MG/ML
4 INJECTION INTRAMUSCULAR; INTRAVENOUS EVERY 12 HOURS PRN
Status: DISCONTINUED | OUTPATIENT
Start: 2019-10-15 | End: 2019-10-15 | Stop reason: HOSPADM

## 2019-10-15 RX ORDER — VERAPAMIL HYDROCHLORIDE 2.5 MG/ML
INJECTION, SOLUTION INTRAVENOUS
Status: DISCONTINUED | OUTPATIENT
Start: 2019-10-15 | End: 2019-10-15 | Stop reason: HOSPADM

## 2019-10-15 RX ORDER — MIDAZOLAM HYDROCHLORIDE 1 MG/ML
INJECTION, SOLUTION INTRAMUSCULAR; INTRAVENOUS
Status: DISCONTINUED | OUTPATIENT
Start: 2019-10-15 | End: 2019-10-15 | Stop reason: HOSPADM

## 2019-10-15 RX ORDER — NITROGLYCERIN 20 MG/100ML
INJECTION INTRAVENOUS
Status: DISCONTINUED | OUTPATIENT
Start: 2019-10-15 | End: 2019-10-15 | Stop reason: HOSPADM

## 2019-10-15 RX ORDER — LIDOCAINE HYDROCHLORIDE 10 MG/ML
INJECTION, SOLUTION EPIDURAL; INFILTRATION; INTRACAUDAL; PERINEURAL
Status: DISCONTINUED | OUTPATIENT
Start: 2019-10-15 | End: 2019-10-15 | Stop reason: HOSPADM

## 2019-10-15 RX ORDER — FENTANYL CITRATE 50 UG/ML
INJECTION, SOLUTION INTRAMUSCULAR; INTRAVENOUS
Status: DISCONTINUED | OUTPATIENT
Start: 2019-10-15 | End: 2019-10-15 | Stop reason: HOSPADM

## 2019-10-15 RX ORDER — ACETAMINOPHEN 325 MG/1
650 TABLET ORAL EVERY 4 HOURS PRN
Status: DISCONTINUED | OUTPATIENT
Start: 2019-10-15 | End: 2019-10-15 | Stop reason: HOSPADM

## 2019-10-15 RX ORDER — SODIUM CHLORIDE 9 MG/ML
3 INJECTION, SOLUTION INTRAVENOUS CONTINUOUS
Status: ACTIVE | OUTPATIENT
Start: 2019-10-15 | End: 2019-10-15

## 2019-10-15 RX ORDER — HEPARIN SODIUM 1000 [USP'U]/ML
INJECTION, SOLUTION INTRAVENOUS; SUBCUTANEOUS
Status: DISCONTINUED | OUTPATIENT
Start: 2019-10-15 | End: 2019-10-15 | Stop reason: HOSPADM

## 2019-10-15 RX ADMIN — SODIUM CHLORIDE 1800 ML: 0.9 INJECTION, SOLUTION INTRAVENOUS at 11:10

## 2019-10-15 RX ADMIN — SODIUM CHLORIDE 3 ML/KG/HR: 0.9 INJECTION, SOLUTION INTRAVENOUS at 09:10

## 2019-10-15 NOTE — DISCHARGE INSTRUCTIONS
Limit movement of the wrist.  Do not bend wrist or perform heavy lifting for 24 hours.      NO blood pressure cuff or venipuncture to affected arm for 24 hours.    If bleeding occurs, apply pressure to site for 20 minutes. Apply band aid once bleeding stops.  Call 911 if unable to stop bleeding with pressure.     Keep your follow up appointment.      Do not drive, drink alcohol, or sign legal documents for 24 hours, or if taking narcotic pain medication.    Fall Prevention  Millions of people fall every year and injure themselves. You may have had anesthesia or sedation which may increase your risk of falling. You may have health issues that put you at an increased risk of falling.     Here are ways to reduce your risk of falling.  ·   · Make your home safe by keeping walkways clear of objects you may trip over.  · Use non-slip pads under rugs. Do not use area rugs or small throw rugs.  · Use non-slip mats in bathtubs and showers.  · Install handrails and lights on staircases.  · Do not walk in poorly lit areas.  · Do not stand on chairs or wobbly ladders.  · Use caution when reaching overhead or looking upward. This position can cause a loss of balance.  · Be sure your shoes fit properly, have non-slip bottoms and are in good condition.   · Wear shoes both inside and out. Avoid going barefoot or wearing slippers.  · Be cautious when going up and down stairs, curbs, and when walking on uneven sidewalks.  · If your balance is poor, consider using a cane or walker.  · If your fall was related to alcohol use, stop or limit alcohol intake.   · If your fall was related to use of sleeping medicines, talk to your doctor about this. You may need to reduce your dosage at bedtime if you awaken during the night to go to the bathroom.    · To reduce the need for nighttime bathroom trips:  ¨ Avoid drinking fluids for several hours before going to bed  ¨ Empty your bladder before going to bed  ¨ Men can keep a urinal at the  bedside  · Stay as active as you can. Balance, flexibility, strength, and endurance all come from exercise. They all play a role in preventing falls. Ask your healthcare provider which types of activity are right for you.  · Get your vision checked on a regular basis.  · If you have pets, know where they are before you stand up or walk so you don't trip over them.  Use night lights.

## 2019-10-15 NOTE — INTERVAL H&P NOTE
The patient has been examined and the H&P has been reviewed:    I concur with the findings and no changes have occurred since H&P was written.    Anesthesia/Surgery risks, benefits and alternative options discussed and understood by patient/family.    Lab Results   Component Value Date    WBC 7.81 10/11/2019    HGB 12.8 10/11/2019    HCT 41.0 10/11/2019    MCV 96 10/11/2019     10/11/2019     Lab Results   Component Value Date    INR 1.0 10/11/2019    INR 1.0 04/16/2018    INR 1.0 06/24/2016     BMP  Lab Results   Component Value Date     10/11/2019    K 4.8 10/11/2019     10/11/2019    CO2 26 10/11/2019    BUN 34 (H) 10/11/2019    CREATININE 1.5 (H) 10/11/2019    CALCIUM 10.6 (H) 10/11/2019    ANIONGAP 9 10/11/2019    ESTGFRAFRICA 38 (A) 10/11/2019    EGFRNONAA 33 (A) 10/11/2019           Active Hospital Problems    Diagnosis  POA    Coronary artery disease involving native coronary artery of native heart with angina pectoris [I25.119]  Yes      Resolved Hospital Problems   No resolved problems to display.

## 2019-10-15 NOTE — BRIEF OP NOTE
[facility]  Brief Operative Note     SUMMARY     Surgery Date: 10/15/2019     Surgeon(s) and Role:     * Carlos Alberto Yadav MD - Primary    Assisting Surgeon: None    Pre-op Diagnosis:  Coronary artery disease involving native coronary artery of native heart with angina pectoris [I25.119]Abnormal nuclear stress test [R94.39]NDIAYE (dyspnea on exertion) [R06.09]    Post-op Diagnosis:  Post-Op Diagnosis Codes:     * Coronary artery disease involving native coronary artery of native heart with angina pectoris [I25.119]     * Abnormal nuclear stress test [R94.39]     * NDIAYE (dyspnea on exertion) [R06.09]    Procedure(s) (LRB):  Left heart cath 10am start, R rad access (Left)  Catheterization, Coronary Artery, With Instantaneous Wave-Free Ratio Determination    Anesthesia: RN IV Sedation    Description of the findings of the procedure: see below    Findings/Key Components:   LVEDP 10mmHg  LVEF 72% by MPI    LM: normal  LAD: prox stent widely patent (7/25/19 2.75x26 Resolute [place] ROBERTO)  LCx: normal  RCA: dom, prox 40%, iFR 0.97    Imp:  NDIAYE with abnl MPI  2V CAD, normal LV fxn  Normal LVEDP  Widely patent prox LAD ROBERTO (7/25/19 2.75x26 Resolute [place] ROBERTO)  Neg iFR prox RCA  R rad vasband for hemostasis    Plan:  Cont med rx  Cont ASA 81mg qd indefinitely  Cont Plavix thru 7/2020  Cont Statin  Home today  Follow up with Dr. Yadav as planned  May need GI/Pulm eval for further assessment of SOB/NDIAYE    Estimated Blood Loss: <50cc         Specimens: None    Diet: cardiac    Activity: ad kriss.    Discharge Note    SUMMARY     Admit Date: 10/15/2019    Discharge Date and Time:  10/15/2019 4PM    Hospital Course (synopsis of major diagnoses, care, treatment, and services provided during the course of the hospital stay): Uneventful LHC/cor/iFR RCA via R radial.  Med rx planned    Final Diagnosis: Post-Op Diagnosis Codes:     * Coronary artery disease involving native coronary artery of native heart with angina  pectoris [I25.119]     * Abnormal nuclear stress test [R94.39]     * NDIAYE (dyspnea on exertion) [R06.09]    Disposition: Home or Self Care    Follow Up/Patient Instructions:     Medications:  Reconciled Home Medications:      Medication List      CHANGE how you take these medications    clopidogrel 75 mg tablet  Commonly known as:  PLAVIX  Take 1 tablet (75 mg total) by mouth once daily.  What changed:  how much to take        CONTINUE taking these medications    aspirin 81 MG EC tablet  Commonly known as:  ECOTRIN  Take 1 tablet (81 mg total) by mouth once daily.     atorvastatin 80 MG tablet  Commonly known as:  LIPITOR  Take 1 tablet (80 mg total) by mouth once daily.     diphenhydrAMINE 25 mg capsule  Commonly known as:  BENADRYL  Take 1 each (25 mg total) by mouth every 6 (six) hours as needed for Itching.     furosemide 20 MG tablet  Commonly known as:  LASIX  Take 20 mg by mouth 2 (two) times daily.     HYDROcodone-acetaminophen 5-325 mg per tablet  Commonly known as:  NORCO  Take 1 tablet by mouth every 6 (six) hours as needed for Pain.     levothyroxine 100 MCG tablet  Commonly known as:  SYNTHROID  Take 100 mcg by mouth once daily.     meclizine 25 mg tablet  Commonly known as:  ANTIVERT  Take 1 tablet (25 mg total) by mouth 3 (three) times daily as needed for Dizziness.     metoprolol tartrate 25 MG tablet  Commonly known as:  LOPRESSOR  Take 0.5 tablets (12.5 mg total) by mouth 2 (two) times daily.     nitroGLYCERIN 0.4 MG SL tablet  Commonly known as:  NITROSTAT  Place 1 tablet (0.4 mg total) under the tongue every 5 (five) minutes as needed for Chest pain.     sertraline 100 MG tablet  Commonly known as:  ZOLOFT  Take 100 mg by mouth once daily.     VITAMIN D2 50,000 unit Cap  Generic drug:  ergocalciferol  Take 50,000 Units by mouth every 7 days. Taking on Monday's          Discharge Procedure Orders   Diet Cardiac     Call MD for:  temperature >100.4     Call MD for:  persistent nausea and vomiting      Call MD for:  severe uncontrolled pain     Call MD for:  difficulty breathing, headache or visual disturbances     Call MD for:  redness, tenderness, or signs of infection (pain, swelling, redness, odor or green/yellow discharge around incision site)     Call MD for:  hives     Call MD for:  persistent dizziness or light-headedness     Call MD for:  extreme fatigue     Remove dressing in 24 hours     Activity as tolerated     Follow-up Information     Go to Carlos Alberto Yadav MD.    Specialties:  Cardiology, INTERVENTIONAL CARDIOLOGY  Why:  10/29/19 120pm, For wound re-check  Contact information:  [address]  [address]  [facility] [address]  [phone]                     Diet: cardiac    Activity: ad kriss.

## 2019-10-15 NOTE — Clinical Note
48 ml injected throughout the case. 102 mL total wasted during the case. 150 mL total used in the case.

## 2019-10-29 ENCOUNTER — OFFICE VISIT (OUTPATIENT)
Dept: CARDIOLOGY | Facility: CLINIC | Age: 79
End: 2019-10-29
Payer: MEDICARE

## 2019-10-29 VITALS
HEART RATE: 67 BPM | OXYGEN SATURATION: 99 % | WEIGHT: 211.44 LBS | BODY MASS INDEX: 36.1 KG/M2 | DIASTOLIC BLOOD PRESSURE: 64 MMHG | SYSTOLIC BLOOD PRESSURE: 132 MMHG | HEIGHT: 64 IN | RESPIRATION RATE: 15 BRPM

## 2019-10-29 DIAGNOSIS — N18.30 STAGE 3 CHRONIC KIDNEY DISEASE: ICD-10-CM

## 2019-10-29 DIAGNOSIS — E78.2 MIXED HYPERLIPIDEMIA: ICD-10-CM

## 2019-10-29 DIAGNOSIS — I25.119 CORONARY ARTERY DISEASE INVOLVING NATIVE CORONARY ARTERY OF NATIVE HEART WITH ANGINA PECTORIS: Primary | ICD-10-CM

## 2019-10-29 DIAGNOSIS — E66.9 NON MORBID OBESITY, UNSPECIFIED OBESITY TYPE: ICD-10-CM

## 2019-10-29 DIAGNOSIS — I10 ESSENTIAL HYPERTENSION: ICD-10-CM

## 2019-10-29 PROCEDURE — 99999 PR PBB SHADOW E&M-EST. PATIENT-LVL III: ICD-10-PCS | Mod: PBBFAC,,, | Performed by: INTERNAL MEDICINE

## 2019-10-29 PROCEDURE — 1101F PR PT FALLS ASSESS DOC 0-1 FALLS W/OUT INJ PAST YR: ICD-10-PCS | Mod: CPTII,S$GLB,, | Performed by: INTERNAL MEDICINE

## 2019-10-29 PROCEDURE — 1101F PT FALLS ASSESS-DOCD LE1/YR: CPT | Mod: CPTII,S$GLB,, | Performed by: INTERNAL MEDICINE

## 2019-10-29 PROCEDURE — 99214 PR OFFICE/OUTPT VISIT, EST, LEVL IV, 30-39 MIN: ICD-10-PCS | Mod: S$GLB,,, | Performed by: INTERNAL MEDICINE

## 2019-10-29 PROCEDURE — 99999 PR PBB SHADOW E&M-EST. PATIENT-LVL III: CPT | Mod: PBBFAC,,, | Performed by: INTERNAL MEDICINE

## 2019-10-29 PROCEDURE — 99214 OFFICE O/P EST MOD 30 MIN: CPT | Mod: S$GLB,,, | Performed by: INTERNAL MEDICINE

## 2019-10-29 NOTE — PROGRESS NOTES
CARDIOVASCULAR PROGRESS NOTE    REASON FOR CONSULT:   Tyra Soliz is a 79 y.o. female who presents for follow up of CAD/NSTEMI/PCI, cath.    PCP: Nilesh  HISTORY OF PRESENT ILLNESS:   The patient returns for follow-up of her heart catheterization.  She continues to complain of episodic dyspnea and chest discomfort.  The symptoms appeared to dena on their own.  She has had no palpitations, lightheadedness, dizziness, or syncope.  There has been no PND, orthopnea, lower extremity edema.  She denies melena, hematuria, or claudicant symptoms.  She appears to be tolerating her dual antiplatelet therapy without complications.  She has had no complication related to the right radial access site for her heart catheterization.  At this point, given the absence of a coronary explanation for her chest discomfort, I suggested the patient try an over-the-counter proton pump inhibitor and follow up with her primary care physician to consider GI evaluation.  I have also suggested that she enroll in outpatient cardiac rehabilitation.    CARDIOVASCULAR HISTORY:   CAD 7/25/19 NSTEMI PCI prox LAD 2.75x26 [facility] [place] [facility]ERTO    Hx ASA allergy s/p desensitization    PAST MEDICAL HISTORY:     Past Medical History:   Diagnosis Date    Arthritis     Chronic kidney disease     stage 3     Coronary artery disease     Hypertension     Obesity (BMI 30-39.9)     Thyroid disease        PAST SURGICAL HISTORY:     Past Surgical History:   Procedure Laterality Date    ABDOMINAL SURGERY      removal scar tissue abdomen    BACK SURGERY      x3    CHOLECYSTECTOMY      CORONARY ANGIOPLASTY  07/25/2019    prox LAD 2.75x26 [facility] [place] [facility]    HYSTERECTOMY      JOINT REPLACEMENT      Rt total knee    LEFT HEART CATHETERIZATION Left 7/25/2019    Procedure: Left heart cath R rad access;  Surgeon: Carlos Alberto Yadav MD;  Location: [facility] CATH LAB;  Service: Cardiology;  Laterality: Left;    LEFT HEART CATHETERIZATION Left 10/15/2019     Procedure: Left heart cath 10am start, R rad access;  Surgeon: Carlos Alberto Yadav MD;  Location: [facility] CATH LAB;  Service: Cardiology;  Laterality: Left;  RN PREOP 10/11/2019    REVISION OF SCAR TISSUE RECTUS MUSCLE      after gall bladder surgery       ALLERGIES AND MEDICATION:     Review of patient's allergies indicates:   Allergen Reactions    Keflex [cephalexin] Itching    Moxifloxacin Itching    Penicillins Hives    Codeine Nausea Only        Medication List           Accurate as of October 29, 2019  1:22 PM. If you have any questions, ask your nurse or doctor.               CHANGE how you take these medications    clopidogrel 75 mg tablet  Commonly known as:  PLAVIX  Take 1 tablet (75 mg total) by mouth once daily.  What changed:  how much to take        CONTINUE taking these medications    aspirin 81 MG EC tablet  Commonly known as:  ECOTRIN  Take 1 tablet (81 mg total) by mouth once daily.     atorvastatin 80 MG tablet  Commonly known as:  LIPITOR  Take 1 tablet (80 mg total) by mouth once daily.     diphenhydrAMINE 25 mg capsule  Commonly known as:  BENADRYL  Take 1 each (25 mg total) by mouth every 6 (six) hours as needed for Itching.     furosemide 20 MG tablet  Commonly known as:  LASIX     HYDROcodone-acetaminophen 5-325 mg per tablet  Commonly known as:  NORCO     levothyroxine 100 MCG tablet  Commonly known as:  SYNTHROID     meclizine 25 mg tablet  Commonly known as:  ANTIVERT  Take 1 tablet (25 mg total) by mouth 3 (three) times daily as needed for Dizziness.     metoprolol tartrate 25 MG tablet  Commonly known as:  LOPRESSOR  Take 0.5 tablets (12.5 mg total) by mouth 2 (two) times daily.     nitroGLYCERIN 0.4 MG SL tablet  Commonly known as:  NITROSTAT  Place 1 tablet (0.4 mg total) under the tongue every 5 (five) minutes as needed for Chest pain.     sertraline 100 MG tablet  Commonly known as:  ZOLOFT     VITAMIN D2 50,000 unit Cap  Generic drug:  ergocalciferol            SOCIAL HISTORY:      Social History     Socioeconomic History    Marital status:      Spouse name: Not on file    Number of children: Not on file    Years of education: Not on file    Highest education level: Not on file   Occupational History    Not on file   Social Needs    Financial resource strain: Not on file    Food insecurity:     Worry: Not on file     Inability: Not on file    Transportation needs:     Medical: Not on file     Non-medical: Not on file   Tobacco Use    Smoking status: Former Smoker     Last attempt to quit:      Years since quittin.8    Smokeless tobacco: Never Used   Substance and Sexual Activity    Alcohol use: No    Drug use: No    Sexual activity: Never     Partners: Male   Lifestyle    Physical activity:     Days per week: Not on file     Minutes per session: Not on file    Stress: Not on file   Relationships    Social connections:     Talks on phone: Not on file     Gets together: Not on file     Attends Restorationism service: Not on file     Active member of club or organization: Not on file     Attends meetings of clubs or organizations: Not on file     Relationship status: Not on file   Other Topics Concern    Not on file   Social History Narrative    Not on file       FAMILY HISTORY:   History reviewed. No pertinent family history.    REVIEW OF SYSTEMS:   Review of Systems   Constitutional: Negative for chills, diaphoresis and fever.   HENT: Negative for nosebleeds.    Eyes: Negative for blurred vision, double vision and photophobia.   Respiratory: Positive for shortness of breath. Negative for hemoptysis and wheezing.    Cardiovascular: Positive for chest pain. Negative for palpitations, orthopnea, claudication, leg swelling and PND.   Gastrointestinal: Negative for abdominal pain, blood in stool, heartburn, melena, nausea and vomiting.   Genitourinary: Negative for flank pain and hematuria.   Musculoskeletal: Negative for falls, myalgias and neck pain.   Skin:  "Negative for rash.   Neurological: Negative for dizziness, seizures, loss of consciousness, weakness and headaches.   Endo/Heme/Allergies: Negative for polydipsia. Does not bruise/bleed easily.   Psychiatric/Behavioral: Negative for depression and memory loss. The patient is not nervous/anxious.        PHYSICAL EXAM:     Vitals:    10/29/19 1259   BP: 132/64   Pulse: 67   Resp: 15    Body mass index is 36.86 kg/m².  Weight: 95.9 kg (211 lb 6.7 oz)   Height: 5' 3.5" (161.3 cm)     Physical Exam   Constitutional: She is oriented to person, place, and time. She appears well-developed and well-nourished. She is cooperative.  Non-toxic appearance. No distress.   HENT:   Head: Normocephalic and atraumatic.   Eyes: Pupils are equal, round, and reactive to light. Conjunctivae and EOM are normal. No scleral icterus.   Neck: Trachea normal and normal range of motion. Neck supple. Normal carotid pulses and no JVD present. Carotid bruit is not present. No neck rigidity. No tracheal deviation and no edema present. No thyromegaly present.   Cardiovascular: Normal rate, regular rhythm, S1 normal and S2 normal. PMI is not displaced. Exam reveals distant heart sounds. Exam reveals no gallop and no friction rub.   No murmur heard.  Pulses:       Carotid pulses are 2+ on the right side, and 2+ on the left side.  R rad access site c/d/i.   Pulmonary/Chest: Effort normal and breath sounds normal. No stridor. No respiratory distress. She has no wheezes. She has no rales. She exhibits no tenderness.   Abdominal: Soft. She exhibits no distension. There is no hepatosplenomegaly.   obese   Musculoskeletal: Normal range of motion. She exhibits no edema or tenderness.   Feet:   Right Foot:   Skin Integrity: Negative for ulcer.   Left Foot:   Skin Integrity: Negative for ulcer.   Neurological: She is alert and oriented to person, place, and time. No cranial nerve deficit.   Skin: Skin is warm and dry. No rash noted. No erythema.   Psychiatric: " She has a normal mood and affect. Her speech is normal and behavior is normal.   Vitals reviewed.      DATA:   EKG: (personally reviewed tracing)  7/24/19 SR 88, PRWP, sept TWI (new), inflat ST abnl (new) vs 6/29/19    Laboratory:  CBC:  Recent Labs   Lab 07/25/19 0257 07/26/19  0151 10/11/19  1020   WBC 8.92 8.34 7.81   Hemoglobin 13.2 12.1 12.8   Hematocrit 41.6 38.8 41.0   Platelets 298 224 238       CHEMISTRIES:  Recent Labs   Lab 07/25/19  0257 07/26/19  0151 10/11/19  1020   Glucose 111 H 117 H 99   Sodium 141 140 141   Potassium 4.1 3.8 4.8   BUN, Bld 21 22 34 H   Creatinine 1.4 1.3 1.5 H   eGFR if  41 A 45 A 38 A   eGFR if non  36 A 39 A 33 A   Calcium 10.8 H 8.7 10.6 H       CARDIAC BIOMARKERS:  Recent Labs   Lab 07/24/19 2014 07/24/19  2255 07/25/19  0257   Troponin I 0.425 H 0.406 H 0.542 H       COAGS:  Recent Labs   Lab 04/16/18  1730 10/11/19  1020   INR 1.0 1.0       LIPIDS/LFTS:  Recent Labs   Lab 06/29/19  1801 07/24/19 2014 07/25/19  0257 10/11/19  1020   Cholesterol  --   --  178 120   Triglycerides  --   --  71 50   HDL  --   --  54 60   LDL Cholesterol  --   --  109.8 50.0 L   Non-HDL Cholesterol  --   --  124 60   AST 21 21  --  17   ALT 12 17  --  15       Cardiovascular Testing:  Cath 10/15/19 (images personally reviewed and interpreted)  LVEDP 10mmHg  LVEF 72% by MPI  LM: normal  LAD: prox stent widely patent (7/25/19 2.75x26 [facility] [place] ROBERTO)  LCx: normal  RCA: dom, prox 40%, iFR 0.97  Imp:  NDIAYE with abnl MPI  2V CAD, normal LV fxn  Normal LVEDP  Widely patent prox LAD ROBERTO (7/25/19 2.75x26 [facility] [place] [facility])  Neg iFR prox RCA  R rad vasband for hemostasis  Plan:  Cont med rx  Cont ASA 81mg qd indefinitely  Cont Plavix thru 7/2020  Cont Statin  Home today  Follow up with Dr. Yadav as planned  May need GI/Pulm eval for further assessment of SOB/NDIAYE    L MPI 9/12/19 (?ant isch on my pers review of images)    The perfusion scan is free of evidence from  myocardial ischemia or injury.    There is a mild intensity defect in the anteroapical wall of the left ventricle, secondary to breast attenuation.    Gated perfusion images showed an ejection fraction of 72 % post stress.    The EKG portion of this study is negative for ischemia.    There were no arrhythmias during stress.    The patient reported no chest pain during the stress test.    LE art US/BEE 9/12/19  No evidence of hemodynamically significant infrainguinal PAD bilaterally.  Biphasic waveforms throughout.  Normal BEE bilaterally.    Cath 7/25/19 (images personally reviewed and interpreted)  LVEDP: 10mmHg  LVEF: prelim normal by echo  Dominance: Right  LM: normal  LAD: prox 90%  LCx: normal  RCA: prox-mid 40%  PCI prox LAD:  Preop Plavix/enox, intraop heparin/aggrastat (ASA allegry noted)  Predil 2.5x15  Stent 2.75x26 [facility] [place] [facility]  Postdil prox 3.0x12 NC 20 janelle  Excellent result, T3 flow, 0% residual stenosis  Hemostasis:  R Radial band  Impression:  NSTEMI  2V CAD, normal LV fxn  ASA allergy noted  Successful PCI prox LAD 2.75x26 [facility] [place] [facility]ERTO  R rad vasband for hemostasis  Plan:  Observe in ICU  Aggrastat gtt x12 hrs  ASA desentization protocol  Cont ASA 81mg qd indefinitely thereafter  Plavix 75mg qd for 1 year (thru 7/2020)  BBl/ACE/Statin  Home in am  Follow up with Dr. Yadav 2 weeks  Outpat cardiac rehab referral  Outpat MPI for assessment of RCA stenosis    Echo: 7/25/19  · Normal left ventricular systolic function. The estimated ejection fraction is 60%  · No wall motion abnormalities.  · Normal right ventricular systolic function.    ASSESSMENT:   # CAD/NSTEMI 7/25/19 s/p PCI prox LAD 2.75x26 [facility] [place] [facility]O, residual RCA stenosis noted.  Pt with persistent angina and NDIAYE, class III.  MPI 9/2019 with anterior ischemia.  Cath 10/15/2019 with patent prox LAD stent and neg RCA iFR.  # Hx ASA allergy s/p successful desensitization 7/25/19  # HLP on atorva 80mg  # HTN, controlled   #  BMI 37, up 1 unit since last OV  # CKD3  # bilat calf claudication, LE art US/BEE 9/2019 normal.    PLAN:   Cont med rx  ASA 81mg qd indefinitely (pt warned to avoid stopping ASA as she may resensitize)  Plavix 75mg qd for 1 year (thru 7/2020)  [facility] cardiac rehab referral  Diet/exercise/weight loss  OTC PPI suggested +/- GI eval, I will defer to PCP  RTC 3 months      Carlos Alberto Yadav MD, FACC

## 2019-12-23 ENCOUNTER — HOSPITAL ENCOUNTER (EMERGENCY)
Facility: HOSPITAL | Age: 79
Discharge: HOME | End: 2019-12-23
Attending: EMERGENCY MEDICINE
Payer: MEDICARE

## 2019-12-23 VITALS
WEIGHT: 209 LBS | OXYGEN SATURATION: 96 % | RESPIRATION RATE: 20 BRPM | SYSTOLIC BLOOD PRESSURE: 150 MMHG | BODY MASS INDEX: 37.03 KG/M2 | HEIGHT: 63 IN | TEMPERATURE: 99 F | HEART RATE: 65 BPM | DIASTOLIC BLOOD PRESSURE: 67 MMHG

## 2019-12-23 DIAGNOSIS — L03.221 CELLULITIS OF NECK: Primary | ICD-10-CM

## 2019-12-23 DIAGNOSIS — L30.9 DERMATITIS: ICD-10-CM

## 2019-12-23 PROCEDURE — 96372 THER/PROPH/DIAG INJ SC/IM: CPT

## 2019-12-23 PROCEDURE — 25000003 PHARM REV CODE 250: Performed by: PHYSICIAN ASSISTANT

## 2019-12-23 PROCEDURE — 63600175 PHARM REV CODE 636 W HCPCS: Performed by: PHYSICIAN ASSISTANT

## 2019-12-23 PROCEDURE — 99284 EMERGENCY DEPT VISIT MOD MDM: CPT | Mod: 25

## 2019-12-23 RX ORDER — TRIAMCINOLONE ACETONIDE 1 MG/G
OINTMENT TOPICAL 2 TIMES DAILY
Qty: 15 G | Refills: 0 | Status: SHIPPED | OUTPATIENT
Start: 2019-12-23 | End: 2020-09-21 | Stop reason: CLARIF

## 2019-12-23 RX ORDER — ONDANSETRON 4 MG/1
4 TABLET, ORALLY DISINTEGRATING ORAL
Status: COMPLETED | OUTPATIENT
Start: 2019-12-23 | End: 2019-12-23

## 2019-12-23 RX ORDER — BACITRACIN ZINC 500 UNIT/G
OINTMENT (GRAM) TOPICAL 2 TIMES DAILY
Qty: 14 G | Refills: 0 | Status: SHIPPED | OUTPATIENT
Start: 2019-12-23 | End: 2020-09-21 | Stop reason: CLARIF

## 2019-12-23 RX ORDER — MORPHINE SULFATE 10 MG/ML
2 INJECTION INTRAMUSCULAR; INTRAVENOUS; SUBCUTANEOUS ONCE
Status: COMPLETED | OUTPATIENT
Start: 2019-12-23 | End: 2019-12-23

## 2019-12-23 RX ORDER — CLINDAMYCIN HYDROCHLORIDE 150 MG/1
300 CAPSULE ORAL
Status: COMPLETED | OUTPATIENT
Start: 2019-12-23 | End: 2019-12-23

## 2019-12-23 RX ORDER — CLINDAMYCIN HYDROCHLORIDE 300 MG/1
300 CAPSULE ORAL 4 TIMES DAILY
Qty: 28 CAPSULE | Refills: 0 | Status: SHIPPED | OUTPATIENT
Start: 2019-12-23 | End: 2019-12-30

## 2019-12-23 RX ADMIN — CLINDAMYCIN HYDROCHLORIDE 300 MG: 150 CAPSULE ORAL at 06:12

## 2019-12-23 RX ADMIN — MORPHINE SULFATE 2 MG: 10 INJECTION INTRAVENOUS at 06:12

## 2019-12-23 RX ADMIN — ONDANSETRON 4 MG: 4 TABLET, ORALLY DISINTEGRATING ORAL at 06:12

## 2019-12-24 ENCOUNTER — TELEPHONE (OUTPATIENT)
Dept: EMERGENCY MEDICINE | Facility: HOSPITAL | Age: 79
End: 2019-12-24

## 2019-12-24 NOTE — DISCHARGE INSTRUCTIONS
Take all antibiotics as prescribed.  Try to take with meals to limit nausea.    Apply the bacitracin ointment to the open wounds, twice daily.    Apply the Kenalog steroid ointment to the itchy rash to the back of your neck.    Follow-up with primary care provider later this week for re-evaluation.  Please return to this ED if any worsening, if you begin with fever, if any other problems occur.

## 2019-12-24 NOTE — ED PROVIDER NOTES
Encounter Date: 12/23/2019    SCRIBE #1 NOTE: I, Yasmani Olmedo, am scribing for, and in the presence of,  Maximo Lam PA-C. I have scribed the following portions of the note - Other sections scribed: HPI, ROS.       History     Chief Complaint   Patient presents with    Rash     pt c/o rash and bumps and sores to her (R) arm and the back of her neck x 1 month; + redness, burning, and itching; says she took benadryl with no relief    Sore     CC: Rash    HPI: This 79 y.o. Female with a past medical history of arthritis, chronic kidney disease, coronary artery disease, hypertension and thyroid disease presents to the ED for an evaluation of a worsening middle upper back bump with associated itching burning rashes to her posterior neck, bilateral upper arm and L hand for the past month. Pt reports she recently had a fall which caused her L hand rash to worsen. She believes her rashes are blisters and exist on both arms but worse on the R upper side. Pt denies fever, nausea, dysuria or numbness. She complies with Zoloft for depression and Synthroid for thyroid disease. Pt attempted Benadryl with no relief. She is concerned she may have shingles.    The history is provided by the patient. No  was used.     Review of patient's allergies indicates:   Allergen Reactions    Keflex [cephalexin] Itching    Moxifloxacin Itching    Penicillins Hives    Codeine Nausea Only     Past Medical History:   Diagnosis Date    Arthritis     Chronic kidney disease     stage 3     Coronary artery disease     Hypertension     Obesity (BMI 30-39.9)     Thyroid disease      Past Surgical History:   Procedure Laterality Date    ABDOMINAL SURGERY      removal scar tissue abdomen    BACK SURGERY      x3    CHOLECYSTECTOMY      CORONARY ANGIOPLASTY  07/25/2019    prox LAD 2.75x26 [facility] [place] [facility]    HYSTERECTOMY      JOINT REPLACEMENT      Rt total knee    LEFT HEART CATHETERIZATION Left 7/25/2019     Procedure: Left heart cath R rad access;  Surgeon: Carlos Alberto Yadav MD;  Location: [facility] CATH LAB;  Service: Cardiology;  Laterality: Left;    LEFT HEART CATHETERIZATION Left 10/15/2019    Procedure: Left heart cath 10am start, R rad access;  Surgeon: Carlos Alberto Yadav MD;  Location: [facility] CATH LAB;  Service: Cardiology;  Laterality: Left;  RN PREOP 10/11/2019    REVISION OF SCAR TISSUE RECTUS MUSCLE      after gall bladder surgery     History reviewed. No pertinent family history.  Social History     Tobacco Use    Smoking status: Former Smoker     Last attempt to quit:      Years since quittin.9    Smokeless tobacco: Never Used   Substance Use Topics    Alcohol use: No    Drug use: No     Review of Systems   Constitutional: Negative for fever.   HENT: Negative for sore throat.    Respiratory: Negative for shortness of breath.    Cardiovascular: Negative for chest pain.   Gastrointestinal: Negative for nausea.   Genitourinary: Negative for dysuria.   Musculoskeletal: Negative for back pain.   Skin: Positive for rash.        (+) mid upper back bump   Neurological: Negative for weakness and numbness.   Hematological: Does not bruise/bleed easily.       Physical Exam     Initial Vitals [19 1735]   BP Pulse Resp Temp SpO2   (!) 154/91 73 20 97.6 °F (36.4 °C) 99 %      MAP       --         Physical Exam    Nursing note and vitals reviewed.  Constitutional: She appears well-developed and well-nourished. She is not diaphoretic. No distress.   HENT:   Head: Normocephalic and atraumatic.   Eyes: Conjunctivae and EOM are normal. Pupils are equal, round, and reactive to light.   Neck: Normal range of motion. Neck supple. No tracheal deviation present.   Pulmonary/Chest: No stridor. No respiratory distress.   Musculoskeletal:   Posterior R-sided neck with firm area of induration without fluctuance; erythematous, warm, tender.    Lymphadenopathy:     She has no cervical adenopathy.   Neurological: She is  alert and oriented to person, place, and time.   Skin: Skin is warm and dry. Capillary refill takes less than 2 seconds.   Pt with multiple crusted wounds to R upper arm with faint surrounding erythema.    There is a macular, slightly crusted rash to the posterior neck without tenderness, without induration    Similar rash to right AC region, also to left dorsal medial hand   Psychiatric: She has a normal mood and affect. Her behavior is normal. Judgment and thought content normal.         ED Course   Procedures  Labs Reviewed - No data to display       Imaging Results    None          Medical Decision Making:   Differential Diagnosis:   Abscess, cellulitis, secondary infection, shingles, dermatitis  ED Management:  She states that the macular lesions are pruritic, but not necessarily painful; they are nontender. I think we are dealing with 2 different rashes. One appears to be more dermatitis, the other appears to be infectious or bacterial.      Patient states the open wounds were initially blisters. If the wounds were in fact vesicular, then perhaps this is zoster; however, she states that lesions were initially macular, like the lesions to her R AC region and L hand; these lesions are bilateral, therefore not shingles. Regardless, there appears to be superficial infection with possibly developing abscess to R posterior neck. No fluctuance. Will advise warm compresses, place on abx, have her return if any worsening.  I have prescribed a short course of clindamycin.  Patient states she has tolerated in the past.  We did discuss discontinuing if she begins with severe diarrhea.  Return precautions discussed.    Allergy to Keflex. Allergy to Penicillins. I do want to cover for MRSA. I will trial Clindamycin. Spoke with pt regarding likelihood of possible GI upset, need for prompt PCP f/u, need for contacting ED should any problems arise.            Scribe Attestation:   Scribe #1: I performed the above scribed  service and the documentation accurately describes the services I performed. I attest to the accuracy of the note.                          Clinical Impression:       ICD-10-CM ICD-9-CM   1. Cellulitis of neck L03.221 682.1         Disposition:   Disposition: Discharged  Condition: Stable    Scribe attestation: I, Maximo Lam, personally performed the services described in this documentation. All medical record entries made by the scribe were at my direction and in my presence.  I have reviewed the chart and agree that the record reflects my personal performance and is accurate and complete.                               Maximo Lam PA-C  12/24/19 0904

## 2020-09-10 ENCOUNTER — OFFICE VISIT (OUTPATIENT)
Dept: CARDIOLOGY | Facility: CLINIC | Age: 80
End: 2020-09-10
Payer: MEDICARE

## 2020-09-10 VITALS
SYSTOLIC BLOOD PRESSURE: 130 MMHG | WEIGHT: 214.31 LBS | DIASTOLIC BLOOD PRESSURE: 76 MMHG | BODY MASS INDEX: 37.97 KG/M2 | HEART RATE: 55 BPM | OXYGEN SATURATION: 96 % | RESPIRATION RATE: 15 BRPM | HEIGHT: 63 IN

## 2020-09-10 DIAGNOSIS — I25.110 CORONARY ARTERY DISEASE INVOLVING NATIVE CORONARY ARTERY OF NATIVE HEART WITH UNSTABLE ANGINA PECTORIS: Primary | ICD-10-CM

## 2020-09-10 DIAGNOSIS — N18.30 STAGE 3 CHRONIC KIDNEY DISEASE: ICD-10-CM

## 2020-09-10 DIAGNOSIS — R94.39 ABNORMAL NUCLEAR STRESS TEST: ICD-10-CM

## 2020-09-10 DIAGNOSIS — I10 ESSENTIAL HYPERTENSION: ICD-10-CM

## 2020-09-10 DIAGNOSIS — E66.9 NON MORBID OBESITY, UNSPECIFIED OBESITY TYPE: ICD-10-CM

## 2020-09-10 DIAGNOSIS — R06.09 DOE (DYSPNEA ON EXERTION): ICD-10-CM

## 2020-09-10 DIAGNOSIS — I25.2 HISTORY OF NON-ST ELEVATION MYOCARDIAL INFARCTION (NSTEMI): ICD-10-CM

## 2020-09-10 DIAGNOSIS — E78.2 MIXED HYPERLIPIDEMIA: ICD-10-CM

## 2020-09-10 PROCEDURE — 99999 PR PBB SHADOW E&M-EST. PATIENT-LVL IV: CPT | Mod: PBBFAC,,, | Performed by: INTERNAL MEDICINE

## 2020-09-10 PROCEDURE — 3078F PR MOST RECENT DIASTOLIC BLOOD PRESSURE < 80 MM HG: ICD-10-PCS | Mod: CPTII,S$GLB,, | Performed by: INTERNAL MEDICINE

## 2020-09-10 PROCEDURE — 1101F PR PT FALLS ASSESS DOC 0-1 FALLS W/OUT INJ PAST YR: ICD-10-PCS | Mod: CPTII,S$GLB,, | Performed by: INTERNAL MEDICINE

## 2020-09-10 PROCEDURE — 99215 PR OFFICE/OUTPT VISIT, EST, LEVL V, 40-54 MIN: ICD-10-PCS | Mod: 25,S$GLB,, | Performed by: INTERNAL MEDICINE

## 2020-09-10 PROCEDURE — 3078F DIAST BP <80 MM HG: CPT | Mod: CPTII,S$GLB,, | Performed by: INTERNAL MEDICINE

## 2020-09-10 PROCEDURE — 1125F AMNT PAIN NOTED PAIN PRSNT: CPT | Mod: S$GLB,,, | Performed by: INTERNAL MEDICINE

## 2020-09-10 PROCEDURE — 93000 EKG 12-LEAD: ICD-10-PCS | Mod: S$GLB,,, | Performed by: INTERNAL MEDICINE

## 2020-09-10 PROCEDURE — 1101F PT FALLS ASSESS-DOCD LE1/YR: CPT | Mod: CPTII,S$GLB,, | Performed by: INTERNAL MEDICINE

## 2020-09-10 PROCEDURE — 99999 PR PBB SHADOW E&M-EST. PATIENT-LVL IV: ICD-10-PCS | Mod: PBBFAC,,, | Performed by: INTERNAL MEDICINE

## 2020-09-10 PROCEDURE — 93000 ELECTROCARDIOGRAM COMPLETE: CPT | Mod: S$GLB,,, | Performed by: INTERNAL MEDICINE

## 2020-09-10 PROCEDURE — 3075F PR MOST RECENT SYSTOLIC BLOOD PRESS GE 130-139MM HG: ICD-10-PCS | Mod: CPTII,S$GLB,, | Performed by: INTERNAL MEDICINE

## 2020-09-10 PROCEDURE — 3075F SYST BP GE 130 - 139MM HG: CPT | Mod: CPTII,S$GLB,, | Performed by: INTERNAL MEDICINE

## 2020-09-10 PROCEDURE — 1125F PR PAIN SEVERITY QUANTIFIED, PAIN PRESENT: ICD-10-PCS | Mod: S$GLB,,, | Performed by: INTERNAL MEDICINE

## 2020-09-10 PROCEDURE — 1159F PR MEDICATION LIST DOCUMENTED IN MEDICAL RECORD: ICD-10-PCS | Mod: S$GLB,,, | Performed by: INTERNAL MEDICINE

## 2020-09-10 PROCEDURE — 1159F MED LIST DOCD IN RCRD: CPT | Mod: S$GLB,,, | Performed by: INTERNAL MEDICINE

## 2020-09-10 PROCEDURE — 99215 OFFICE O/P EST HI 40 MIN: CPT | Mod: 25,S$GLB,, | Performed by: INTERNAL MEDICINE

## 2020-09-10 RX ORDER — SODIUM CHLORIDE 9 MG/ML
3 INJECTION, SOLUTION INTRAVENOUS CONTINUOUS
Status: CANCELLED | OUTPATIENT
Start: 2020-09-23 | End: 2020-09-23

## 2020-09-10 RX ORDER — CLOPIDOGREL BISULFATE 75 MG/1
75 TABLET ORAL DAILY
Qty: 90 TABLET | Refills: 3 | Status: ON HOLD | OUTPATIENT
Start: 2020-09-10 | End: 2020-09-23 | Stop reason: HOSPADM

## 2020-09-10 NOTE — PROGRESS NOTES
CARDIOVASCULAR PROGRESS NOTE    REASON FOR CONSULT:   Tyra Soliz is a 80 y.o. female who presents for follow up of CAD.    PCP: Nilesh  HISTORY OF PRESENT ILLNESS:   Last seen October 2019.    The patient returns for follow-up, after nearly 1 year hiatus.  She reports the recurrence of her anginal symptoms describes as a chest tightness, which will occur both with exertion and occasionally at rest.  She never has to use more than 1-2 nitroglycerin.  She does have class 3 dyspnea, and tells me that the angina limits her ability to get around.  She has had no cris syncope although she does describe occasional dizziness.  There has been no palpitations.  She denies PND, orthopnea, melena, hematuria, or claudicant symptoms.  She does continue to take both aspirin and Plavix.  Of note, the patient did not enter cardiac rehabilitation after her procedure back in 2019, and was never seen by either pulmonology nor Gastroenterology, which was suggested after her last catheterization.    We discussed the pros and cons of continued medical therapy, verses repeat noninvasive testing, verses direct angiography.  Given her prior nuclear stress test abnormalities, and her current symptomatology, I suggested we move directly to catheterization.  We did discuss the risks of the procedure, particularly the risks of contrast nephropathy given her underlying renal insufficiency.  She is agreeable to proceeding.    CARDIOVASCULAR HISTORY:   CAD 7/25/19 NSTEMI PCI prox LAD 2.75x26 Resolute [place] ROBERTO    Hx ASA allergy s/p desensitization    PAST MEDICAL HISTORY:     Past Medical History:   Diagnosis Date    Arthritis     Chronic kidney disease     stage 3     Coronary artery disease     Hypertension     Obesity (BMI 30-39.9)     Thyroid disease        PAST SURGICAL HISTORY:     Past Surgical History:   Procedure Laterality Date    ABDOMINAL SURGERY      removal scar tissue abdomen    BACK SURGERY      x3     CHOLECYSTECTOMY      CORONARY ANGIOPLASTY  07/25/2019    prox LAD 2.75x26 Resolute [place] ROBERTO    HYSTERECTOMY      JOINT REPLACEMENT      Rt total knee    LEFT HEART CATHETERIZATION Left 7/25/2019    Procedure: Left heart cath R rad access;  Surgeon: Carlos Alberto Yadav MD;  Location: [facility] CATH LAB;  Service: Cardiology;  Laterality: Left;    LEFT HEART CATHETERIZATION Left 10/15/2019    Procedure: Left heart cath 10am start, R rad access;  Surgeon: Carlos Alberto Yadav MD;  Location: [facility] CATH LAB;  Service: Cardiology;  Laterality: Left;  RN PREOP 10/11/2019    REVISION OF SCAR TISSUE RECTUS MUSCLE      after gall bladder surgery       ALLERGIES AND MEDICATION:     Review of patient's allergies indicates:   Allergen Reactions    Keflex [cephalexin] Itching    Moxifloxacin Itching    Penicillins Hives    Codeine Nausea Only        Medication List          Accurate as of September 10, 2020  9:11 AM. If you have any questions, ask your nurse or doctor.            CHANGE how you take these medications    clopidogreL 75 mg tablet  Commonly known as: PLAVIX  Take 1 tablet (75 mg total) by mouth once daily.  What changed: how much to take        CONTINUE taking these medications    aspirin 81 MG EC tablet  Commonly known as: ECOTRIN  Take 1 tablet (81 mg total) by mouth once daily.     atorvastatin 80 MG tablet  Commonly known as: LIPITOR  Take 1 tablet (80 mg total) by mouth once daily.     bacitracin 500 unit/gram Oint  Apply topically 2 (two) times daily.     diphenhydrAMINE 25 mg capsule  Commonly known as: BENADRYL  Take 1 each (25 mg total) by mouth every 6 (six) hours as needed for Itching.     furosemide 20 MG tablet  Commonly known as: LASIX     HYDROcodone-acetaminophen 5-325 mg per tablet  Commonly known as: NORCO     levothyroxine 100 MCG tablet  Commonly known as: SYNTHROID     meclizine 25 mg tablet  Commonly known as: ANTIVERT  Take 1 tablet (25 mg total) by mouth 3 (three) times daily as needed for  Dizziness.     metoprolol tartrate 25 MG tablet  Commonly known as: LOPRESSOR  Take 0.5 tablets (12.5 mg total) by mouth 2 (two) times daily.     nitroGLYCERIN 0.4 MG SL tablet  Commonly known as: NITROSTAT  Place 1 tablet (0.4 mg total) under the tongue every 5 (five) minutes as needed for Chest pain.     sertraline 100 MG tablet  Commonly known as: ZOLOFT     triamcinolone acetonide 0.1% 0.1 % ointment  Commonly known as: KENALOG  Apply topically 2 (two) times daily.     VITAMIN D2 50,000 unit Cap  Generic drug: ergocalciferol            SOCIAL HISTORY:     Social History     Socioeconomic History    Marital status:      Spouse name: Not on file    Number of children: Not on file    Years of education: Not on file    Highest education level: Not on file   Occupational History    Not on file   Social Needs    Financial resource strain: Not on file    Food insecurity     Worry: Not on file     Inability: Not on file    Transportation needs     Medical: Not on file     Non-medical: Not on file   Tobacco Use    Smoking status: Former Smoker     Quit date:      Years since quittin.7    Smokeless tobacco: Never Used   Substance and Sexual Activity    Alcohol use: No    Drug use: No    Sexual activity: Never     Partners: Male   Lifestyle    Physical activity     Days per week: Not on file     Minutes per session: Not on file    Stress: Not on file   Relationships    Social connections     Talks on phone: Not on file     Gets together: Not on file     Attends Jewish service: Not on file     Active member of club or organization: Not on file     Attends meetings of clubs or organizations: Not on file     Relationship status: Not on file   Other Topics Concern    Not on file   Social History Narrative    Not on file       FAMILY HISTORY:   History reviewed. No pertinent family history.    REVIEW OF SYSTEMS:   Review of Systems   Constitutional: Negative for chills, diaphoresis and  "fever.   HENT: Negative for nosebleeds.    Eyes: Negative for blurred vision, double vision and photophobia.   Respiratory: Positive for shortness of breath. Negative for hemoptysis and wheezing.    Cardiovascular: Positive for chest pain. Negative for palpitations, orthopnea, claudication, leg swelling and PND.   Gastrointestinal: Negative for abdominal pain, blood in stool, heartburn, melena, nausea and vomiting.   Genitourinary: Negative for flank pain and hematuria.   Musculoskeletal: Negative for falls, myalgias and neck pain.   Skin: Negative for rash.   Neurological: Negative for dizziness, seizures, loss of consciousness, weakness and headaches.   Endo/Heme/Allergies: Negative for polydipsia. Does not bruise/bleed easily.   Psychiatric/Behavioral: Negative for depression and memory loss. The patient is not nervous/anxious.        PHYSICAL EXAM:     Vitals:    09/10/20 0903   BP: 130/76   Pulse: (!) 55   Resp: 15    Body mass index is 37.96 kg/m².  Weight: 97.2 kg (214 lb 4.6 oz)   Height: 5' 3" (160 cm)     Physical Exam  Vitals signs reviewed.   Constitutional:       General: She is not in acute distress.     Appearance: She is well-developed. She is obese. She is not ill-appearing, toxic-appearing or diaphoretic.   HENT:      Head: Normocephalic and atraumatic.   Eyes:      General: No scleral icterus.     Conjunctiva/sclera: Conjunctivae normal.      Pupils: Pupils are equal, round, and reactive to light.   Neck:      Musculoskeletal: Normal range of motion and neck supple. No edema or neck rigidity.      Thyroid: No thyromegaly.      Vascular: Normal carotid pulses. No carotid bruit or JVD.      Trachea: Trachea normal. No tracheal deviation.   Cardiovascular:      Rate and Rhythm: Normal rate and regular rhythm.      Pulses:           Carotid pulses are 2+ on the right side and 2+ on the left side.       Radial pulses are 2+ on the right side.      Heart sounds: S1 normal and S2 normal. Heart sounds not " distant. No murmur. No friction rub. No gallop.    Pulmonary:      Effort: Pulmonary effort is normal. No respiratory distress.      Breath sounds: Normal breath sounds. No stridor. No wheezing, rhonchi or rales.   Chest:      Chest wall: No tenderness.   Abdominal:      General: There is no distension.      Palpations: Abdomen is soft.   Musculoskeletal: Normal range of motion.         General: No swelling or tenderness.   Feet:      Right foot:      Skin integrity: No ulcer.      Left foot:      Skin integrity: No ulcer.   Skin:     General: Skin is warm and dry.      Findings: No erythema or rash.   Neurological:      Mental Status: She is alert and oriented to person, place, and time.      Cranial Nerves: No cranial nerve deficit.   Psychiatric:         Speech: Speech normal.         Behavior: Behavior normal. Behavior is cooperative.         DATA:   EKG: (personally reviewed tracing(s))  9/10/20 SR 55, low volt, similar to 9/12/19    Laboratory:  CBC:  Recent Labs   Lab 07/25/19 0257 07/26/19  0151 10/11/19  1020   WBC 8.92 8.34 7.81   Hemoglobin 13.2 12.1 12.8   Hematocrit 41.6 38.8 41.0   Platelets 298 224 238       CHEMISTRIES:  Recent Labs   Lab 07/25/19  0257 07/26/19  0151 10/11/19  1020   Glucose 111 H 117 H 99   Sodium 141 140 141   Potassium 4.1 3.8 4.8   BUN, Bld 21 22 34 H   Creatinine 1.4 1.3 1.5 H   eGFR if  41 A 45 A 38 A   eGFR if non  36 A 39 A 33 A   Calcium 10.8 H 8.7 10.6 H       CARDIAC BIOMARKERS:  Recent Labs   Lab 07/24/19 2014 07/24/19  2255 07/25/19  0257   Troponin I 0.425 H 0.406 H 0.542 H       COAGS:  Recent Labs   Lab 04/16/18  1730 10/11/19  1020   INR 1.0 1.0       LIPIDS/LFTS:  Recent Labs   Lab 06/29/19  1801 07/24/19 2014 07/25/19  0257 10/11/19  1020   Cholesterol  --   --  178 120   Triglycerides  --   --  71 50   HDL  --   --  54 60   LDL Cholesterol  --   --  109.8 50.0 L   Non-HDL Cholesterol  --   --  124 60   AST 21 21  --  17   ALT 12  17  --  15       Labs 06/15/2020 ([facility])  Creatinine 1.46  Potassium 4.0  AST 12  ALT 9  Hemoglobin 11.5  Platelets 183  Total cholesterol 127  Triglycerides 92  HDL 59  LDL 50  TSH 1.24        Cardiovascular Testing:  Cath 10/15/19   LVEDP 10mmHg  LVEF 72% by MPI  LM: normal  LAD: prox stent widely patent (7/25/19 2.75x26 Resolute [place] ROBERTO)  LCx: normal  RCA: dom, prox 40%, iFR 0.97  Imp:  NDIAYE with abnl MPI  2V CAD, normal LV fxn  Normal LVEDP  Widely patent prox LAD ROBERTO (7/25/19 2.75x26 [facility] [place] ROBERTO)  Neg iFR prox RCA  R rad vasband for hemostasis  Plan:  Cont med rx  Cont ASA 81mg qd indefinitely  Cont Plavix thru 7/2020  Cont Statin  Home today  Follow up with Dr. Yadav as planned  May need GI/Pulm eval for further assessment of SOB/NDIAYE    L MPI 9/12/19     The perfusion scan is free of evidence from myocardial ischemia or injury.    There is a mild intensity defect in the anteroapical wall of the left ventricle, secondary to breast attenuation.    Gated perfusion images showed an ejection fraction of 72 % post stress.    The EKG portion of this study is negative for ischemia.    There were no arrhythmias during stress.    The patient reported no chest pain during the stress test.    LE art US/BEE 9/12/19  No evidence of hemodynamically significant infrainguinal PAD bilaterally.  Biphasic waveforms throughout.  Normal BEE bilaterally.    Echo: 7/25/19  · Normal left ventricular systolic function. The estimated ejection fraction is 60%  · No wall motion abnormalities.  · Normal right ventricular systolic function.    ASSESSMENT:   # CAD/NSTEMI 7/25/19 s/p PCI prox LAD 2.75x26 Resolute [place] ROBERTO, residual RCA stenosis noted.  Pt with persistent angina and NDIAYE, class III.  MPI 9/2019 with anterior ischemia.  Cath 10/15/2019 with patent prox LAD stent and neg RCA iFR.  Now with progressive NDIAYE and ntg responsive CP  (class III sxs) reminiscent of sxs prior to LAD PCI.  # Hx ASA allergy s/p  successful desensitization 7/25/19  # HLP on atorva 80mg  # HTN, controlled   # BMI 37, stable vs last OV  # CKD3  # hx of bilat calf claudication, LE art US/BEE 9/2019 normal.    PLAN:   Cont med rx  Cont ASA 81mg qd indefinitely (pt prev warned to avoid stopping ASA as she may resensitize)  Cont Plavix 75mg qd pending cath  Check echo  Cath 9/23/20 12pm, R rad access  Diet/exercise/weight loss  RTC post-cath.  If no new CAD/PCI, would recommend GI/pulm evaluations.    Risks, benefits and alternatives of the catheterization procedure were discussed with the patient which include but are not limited to: bleeding, infection, death, heart attack, arrhythmia, kidney failure, stroke, need for emergency surgery, etc.  Patient understands and and agrees to proceed.  Consent was placed on the chart.        Carlos Alberto Yadav MD, FACC

## 2020-09-10 NOTE — PROGRESS NOTES
Patient, Tyra Soliz (MRN #[id number]), presented with a recorded BMI of 37.96 kg/m^2 and a documented comorbidity(s):  - Hypertension  - Hyperlipidemia  to which the severe obesity is a contributing factor. This is consistent with the definition of severe obesity (BMI 35.0-39.9) with comorbidity (ICD-10 E66.01, Z68.35). The patient's severe obesity was monitored, evaluated, addressed and/or treated. This addendum to the medical record is made on 09/10/2020.

## 2020-09-10 NOTE — H&P (VIEW-ONLY)
CARDIOVASCULAR PROGRESS NOTE    REASON FOR CONSULT:   Tyra Soliz is a 80 y.o. female who presents for follow up of CAD.    PCP: Nilesh  HISTORY OF PRESENT ILLNESS:   Last seen October 2019.    The patient returns for follow-up, after nearly 1 year hiatus.  She reports the recurrence of her anginal symptoms describes as a chest tightness, which will occur both with exertion and occasionally at rest.  She never has to use more than 1-2 nitroglycerin.  She does have class 3 dyspnea, and tells me that the angina limits her ability to get around.  She has had no cris syncope although she does describe occasional dizziness.  There has been no palpitations.  She denies PND, orthopnea, melena, hematuria, or claudicant symptoms.  She does continue to take both aspirin and Plavix.  Of note, the patient did not enter cardiac rehabilitation after her procedure back in 2019, and was never seen by either pulmonology nor Gastroenterology, which was suggested after her last catheterization.    We discussed the pros and cons of continued medical therapy, verses repeat noninvasive testing, verses direct angiography.  Given her prior nuclear stress test abnormalities, and her current symptomatology, I suggested we move directly to catheterization.  We did discuss the risks of the procedure, particularly the risks of contrast nephropathy given her underlying renal insufficiency.  She is agreeable to proceeding.    CARDIOVASCULAR HISTORY:   CAD 7/25/19 NSTEMI PCI prox LAD 2.75x26 Resolute [place] ROBERTO    Hx ASA allergy s/p desensitization    PAST MEDICAL HISTORY:     Past Medical History:   Diagnosis Date    Arthritis     Chronic kidney disease     stage 3     Coronary artery disease     Hypertension     Obesity (BMI 30-39.9)     Thyroid disease        PAST SURGICAL HISTORY:     Past Surgical History:   Procedure Laterality Date    ABDOMINAL SURGERY      removal scar tissue abdomen    BACK SURGERY      x3     CHOLECYSTECTOMY      CORONARY ANGIOPLASTY  07/25/2019    prox LAD 2.75x26 Resolute [place] ROBERTO    HYSTERECTOMY      JOINT REPLACEMENT      Rt total knee    LEFT HEART CATHETERIZATION Left 7/25/2019    Procedure: Left heart cath R rad access;  Surgeon: Carlos Alberto Yadav MD;  Location: [facility] CATH LAB;  Service: Cardiology;  Laterality: Left;    LEFT HEART CATHETERIZATION Left 10/15/2019    Procedure: Left heart cath 10am start, R rad access;  Surgeon: Carlos Alberto Yadav MD;  Location: [facility] CATH LAB;  Service: Cardiology;  Laterality: Left;  RN PREOP 10/11/2019    REVISION OF SCAR TISSUE RECTUS MUSCLE      after gall bladder surgery       ALLERGIES AND MEDICATION:     Review of patient's allergies indicates:   Allergen Reactions    Keflex [cephalexin] Itching    Moxifloxacin Itching    Penicillins Hives    Codeine Nausea Only        Medication List          Accurate as of September 10, 2020  9:11 AM. If you have any questions, ask your nurse or doctor.            CHANGE how you take these medications    clopidogreL 75 mg tablet  Commonly known as: PLAVIX  Take 1 tablet (75 mg total) by mouth once daily.  What changed: how much to take        CONTINUE taking these medications    aspirin 81 MG EC tablet  Commonly known as: ECOTRIN  Take 1 tablet (81 mg total) by mouth once daily.     atorvastatin 80 MG tablet  Commonly known as: LIPITOR  Take 1 tablet (80 mg total) by mouth once daily.     bacitracin 500 unit/gram Oint  Apply topically 2 (two) times daily.     diphenhydrAMINE 25 mg capsule  Commonly known as: BENADRYL  Take 1 each (25 mg total) by mouth every 6 (six) hours as needed for Itching.     furosemide 20 MG tablet  Commonly known as: LASIX     HYDROcodone-acetaminophen 5-325 mg per tablet  Commonly known as: NORCO     levothyroxine 100 MCG tablet  Commonly known as: SYNTHROID     meclizine 25 mg tablet  Commonly known as: ANTIVERT  Take 1 tablet (25 mg total) by mouth 3 (three) times daily as needed for  Dizziness.     metoprolol tartrate 25 MG tablet  Commonly known as: LOPRESSOR  Take 0.5 tablets (12.5 mg total) by mouth 2 (two) times daily.     nitroGLYCERIN 0.4 MG SL tablet  Commonly known as: NITROSTAT  Place 1 tablet (0.4 mg total) under the tongue every 5 (five) minutes as needed for Chest pain.     sertraline 100 MG tablet  Commonly known as: ZOLOFT     triamcinolone acetonide 0.1% 0.1 % ointment  Commonly known as: KENALOG  Apply topically 2 (two) times daily.     VITAMIN D2 50,000 unit Cap  Generic drug: ergocalciferol            SOCIAL HISTORY:     Social History     Socioeconomic History    Marital status:      Spouse name: Not on file    Number of children: Not on file    Years of education: Not on file    Highest education level: Not on file   Occupational History    Not on file   Social Needs    Financial resource strain: Not on file    Food insecurity     Worry: Not on file     Inability: Not on file    Transportation needs     Medical: Not on file     Non-medical: Not on file   Tobacco Use    Smoking status: Former Smoker     Quit date:      Years since quittin.7    Smokeless tobacco: Never Used   Substance and Sexual Activity    Alcohol use: No    Drug use: No    Sexual activity: Never     Partners: Male   Lifestyle    Physical activity     Days per week: Not on file     Minutes per session: Not on file    Stress: Not on file   Relationships    Social connections     Talks on phone: Not on file     Gets together: Not on file     Attends Mandaen service: Not on file     Active member of club or organization: Not on file     Attends meetings of clubs or organizations: Not on file     Relationship status: Not on file   Other Topics Concern    Not on file   Social History Narrative    Not on file       FAMILY HISTORY:   History reviewed. No pertinent family history.    REVIEW OF SYSTEMS:   Review of Systems   Constitutional: Negative for chills, diaphoresis and  "fever.   HENT: Negative for nosebleeds.    Eyes: Negative for blurred vision, double vision and photophobia.   Respiratory: Positive for shortness of breath. Negative for hemoptysis and wheezing.    Cardiovascular: Positive for chest pain. Negative for palpitations, orthopnea, claudication, leg swelling and PND.   Gastrointestinal: Negative for abdominal pain, blood in stool, heartburn, melena, nausea and vomiting.   Genitourinary: Negative for flank pain and hematuria.   Musculoskeletal: Negative for falls, myalgias and neck pain.   Skin: Negative for rash.   Neurological: Negative for dizziness, seizures, loss of consciousness, weakness and headaches.   Endo/Heme/Allergies: Negative for polydipsia. Does not bruise/bleed easily.   Psychiatric/Behavioral: Negative for depression and memory loss. The patient is not nervous/anxious.        PHYSICAL EXAM:     Vitals:    09/10/20 0903   BP: 130/76   Pulse: (!) 55   Resp: 15    Body mass index is 37.96 kg/m².  Weight: 97.2 kg (214 lb 4.6 oz)   Height: 5' 3" (160 cm)     Physical Exam  Vitals signs reviewed.   Constitutional:       General: She is not in acute distress.     Appearance: She is well-developed. She is obese. She is not ill-appearing, toxic-appearing or diaphoretic.   HENT:      Head: Normocephalic and atraumatic.   Eyes:      General: No scleral icterus.     Conjunctiva/sclera: Conjunctivae normal.      Pupils: Pupils are equal, round, and reactive to light.   Neck:      Musculoskeletal: Normal range of motion and neck supple. No edema or neck rigidity.      Thyroid: No thyromegaly.      Vascular: Normal carotid pulses. No carotid bruit or JVD.      Trachea: Trachea normal. No tracheal deviation.   Cardiovascular:      Rate and Rhythm: Normal rate and regular rhythm.      Pulses:           Carotid pulses are 2+ on the right side and 2+ on the left side.       Radial pulses are 2+ on the right side.      Heart sounds: S1 normal and S2 normal. Heart sounds not " distant. No murmur. No friction rub. No gallop.    Pulmonary:      Effort: Pulmonary effort is normal. No respiratory distress.      Breath sounds: Normal breath sounds. No stridor. No wheezing, rhonchi or rales.   Chest:      Chest wall: No tenderness.   Abdominal:      General: There is no distension.      Palpations: Abdomen is soft.   Musculoskeletal: Normal range of motion.         General: No swelling or tenderness.   Feet:      Right foot:      Skin integrity: No ulcer.      Left foot:      Skin integrity: No ulcer.   Skin:     General: Skin is warm and dry.      Findings: No erythema or rash.   Neurological:      Mental Status: She is alert and oriented to person, place, and time.      Cranial Nerves: No cranial nerve deficit.   Psychiatric:         Speech: Speech normal.         Behavior: Behavior normal. Behavior is cooperative.         DATA:   EKG: (personally reviewed tracing(s))  9/10/20 SR 55, low volt, similar to 9/12/19    Laboratory:  CBC:  Recent Labs   Lab 07/25/19 0257 07/26/19  0151 10/11/19  1020   WBC 8.92 8.34 7.81   Hemoglobin 13.2 12.1 12.8   Hematocrit 41.6 38.8 41.0   Platelets 298 224 238       CHEMISTRIES:  Recent Labs   Lab 07/25/19  0257 07/26/19  0151 10/11/19  1020   Glucose 111 H 117 H 99   Sodium 141 140 141   Potassium 4.1 3.8 4.8   BUN, Bld 21 22 34 H   Creatinine 1.4 1.3 1.5 H   eGFR if  41 A 45 A 38 A   eGFR if non  36 A 39 A 33 A   Calcium 10.8 H 8.7 10.6 H       CARDIAC BIOMARKERS:  Recent Labs   Lab 07/24/19 2014 07/24/19  2255 07/25/19  0257   Troponin I 0.425 H 0.406 H 0.542 H       COAGS:  Recent Labs   Lab 04/16/18  1730 10/11/19  1020   INR 1.0 1.0       LIPIDS/LFTS:  Recent Labs   Lab 06/29/19  1801 07/24/19 2014 07/25/19  0257 10/11/19  1020   Cholesterol  --   --  178 120   Triglycerides  --   --  71 50   HDL  --   --  54 60   LDL Cholesterol  --   --  109.8 50.0 L   Non-HDL Cholesterol  --   --  124 60   AST 21 21  --  17   ALT 12  17  --  15       Labs 06/15/2020 (Care everywhere)  Creatinine 1.46  Potassium 4.0  AST 12  ALT 9  Hemoglobin 11.5  Platelets 183  Total cholesterol 127  Triglycerides 92  HDL 59  LDL 50  TSH 1.24        Cardiovascular Testing:  Cath 10/15/19   LVEDP 10mmHg  LVEF 72% by MPI  LM: normal  LAD: prox stent widely patent (7/25/19 2.75x26 Resolute [place] ROBERTO)  LCx: normal  RCA: dom, prox 40%, iFR 0.97  Imp:  NDIAYE with abnl MPI  2V CAD, normal LV fxn  Normal LVEDP  Widely patent prox LAD ROBERTO (7/25/19 2.75x26 Resolute [place] ROBERTO)  Neg iFR prox RCA  R rad vasband for hemostasis  Plan:  Cont med rx  Cont ASA 81mg qd indefinitely  Cont Plavix thru 7/2020  Cont Statin  Home today  Follow up with Dr. Yadav as planned  May need GI/Pulm eval for further assessment of SOB/NDIAYE    L MPI 9/12/19     The perfusion scan is free of evidence from myocardial ischemia or injury.    There is a mild intensity defect in the anteroapical wall of the left ventricle, secondary to breast attenuation.    Gated perfusion images showed an ejection fraction of 72 % post stress.    The EKG portion of this study is negative for ischemia.    There were no arrhythmias during stress.    The patient reported no chest pain during the stress test.    LE art US/BEE 9/12/19  No evidence of hemodynamically significant infrainguinal PAD bilaterally.  Biphasic waveforms throughout.  Normal BEE bilaterally.    Echo: 7/25/19  · Normal left ventricular systolic function. The estimated ejection fraction is 60%  · No wall motion abnormalities.  · Normal right ventricular systolic function.    ASSESSMENT:   # CAD/NSTEMI 7/25/19 s/p PCI prox LAD 2.75x26 Resolute [place] ROBERTO, residual RCA stenosis noted.  Pt with persistent angina and NDIAYE, class III.  MPI 9/2019 with anterior ischemia.  Cath 10/15/2019 with patent prox LAD stent and neg RCA iFR.  Now with progressive NDIAYE and ntg responsive CP  (class III sxs) reminiscent of sxs prior to LAD PCI.  # Hx ASA allergy s/p  successful desensitization 7/25/19  # HLP on atorva 80mg  # HTN, controlled   # BMI 37, stable vs last OV  # CKD3  # hx of bilat calf claudication, LE art US/BEE 9/2019 normal.    PLAN:   Cont med rx  Cont ASA 81mg qd indefinitely (pt prev warned to avoid stopping ASA as she may resensitize)  Cont Plavix 75mg qd pending cath  Check echo  Cath 9/23/20 12pm, R rad access  Diet/exercise/weight loss  RTC post-cath.  If no new CAD/PCI, would recommend GI/pulm evaluations.    Risks, benefits and alternatives of the catheterization procedure were discussed with the patient which include but are not limited to: bleeding, infection, death, heart attack, arrhythmia, kidney failure, stroke, need for emergency surgery, etc.  Patient understands and and agrees to proceed.  Consent was placed on the chart.        Carlos Alberto Yadav MD, FACC

## 2020-09-21 ENCOUNTER — HOSPITAL ENCOUNTER (OUTPATIENT)
Dept: PREADMISSION TESTING | Facility: HOSPITAL | Age: 80
Discharge: HOME | End: 2020-09-21
Attending: INTERNAL MEDICINE
Payer: MEDICARE

## 2020-09-21 ENCOUNTER — HOSPITAL ENCOUNTER (OUTPATIENT)
Dept: CARDIOLOGY | Facility: HOSPITAL | Age: 80
Discharge: HOME | End: 2020-09-21
Attending: INTERNAL MEDICINE
Payer: MEDICARE

## 2020-09-21 VITALS
OXYGEN SATURATION: 94 % | SYSTOLIC BLOOD PRESSURE: 126 MMHG | HEART RATE: 48 BPM | HEIGHT: 63 IN | WEIGHT: 216.69 LBS | RESPIRATION RATE: 18 BRPM | DIASTOLIC BLOOD PRESSURE: 56 MMHG | BODY MASS INDEX: 38.39 KG/M2

## 2020-09-21 DIAGNOSIS — Z01.818 PREOP TESTING: ICD-10-CM

## 2020-09-21 DIAGNOSIS — I25.2 HISTORY OF NON-ST ELEVATION MYOCARDIAL INFARCTION (NSTEMI): ICD-10-CM

## 2020-09-21 DIAGNOSIS — R94.39 ABNORMAL NUCLEAR STRESS TEST: ICD-10-CM

## 2020-09-21 DIAGNOSIS — R06.09 DOE (DYSPNEA ON EXERTION): ICD-10-CM

## 2020-09-21 DIAGNOSIS — I25.110 CORONARY ARTERY DISEASE INVOLVING NATIVE CORONARY ARTERY OF NATIVE HEART WITH UNSTABLE ANGINA PECTORIS: ICD-10-CM

## 2020-09-21 LAB
ANION GAP SERPL CALC-SCNC: 11 MMOL/L (ref 8–16)
ASCENDING AORTA: 2.73 CM
AV INDEX (PROSTH): 0.85
AV MEAN GRADIENT: 5 MMHG
AV PEAK GRADIENT: 10 MMHG
AV VALVE AREA: 3.07 CM2
AV VELOCITY RATIO: 0.75
BASOPHILS # BLD AUTO: 0.02 K/UL (ref 0–0.2)
BASOPHILS NFR BLD: 0.3 % (ref 0–1.9)
BSA FOR ECHO PROCEDURE: 2.09 M2
BUN SERPL-MCNC: 17 MG/DL (ref 8–23)
CALCIUM SERPL-MCNC: 9.8 MG/DL (ref 8.7–10.5)
CHLORIDE SERPL-SCNC: 104 MMOL/L (ref 95–110)
CO2 SERPL-SCNC: 29 MMOL/L (ref 23–29)
CREAT SERPL-MCNC: 1.4 MG/DL (ref 0.5–1.4)
CV ECHO LV RWT: 0.37 CM
DIFFERENTIAL METHOD: ABNORMAL
DOP CALC AO PEAK VEL: 1.55 M/S
DOP CALC AO VTI: 38.31 CM
DOP CALC LVOT AREA: 3.6 CM2
DOP CALC LVOT DIAMETER: 2.14 CM
DOP CALC LVOT PEAK VEL: 1.17 M/S
DOP CALC LVOT STROKE VOLUME: 117.74 CM3
DOP CALCLVOT PEAK VEL VTI: 32.75 CM
E WAVE DECELERATION TIME: 221.19 MSEC
E/A RATIO: 1.17
E/E' RATIO: 13.75 M/S
ECHO LV POSTERIOR WALL: 0.89 CM (ref 0.6–1.1)
EOSINOPHIL # BLD AUTO: 0.2 K/UL (ref 0–0.5)
EOSINOPHIL NFR BLD: 3.3 % (ref 0–8)
ERYTHROCYTE [DISTWIDTH] IN BLOOD BY AUTOMATED COUNT: 13.7 % (ref 11.5–14.5)
EST. GFR  (AFRICAN AMERICAN): 41 ML/MIN/1.73 M^2
EST. GFR  (NON AFRICAN AMERICAN): 35 ML/MIN/1.73 M^2
FRACTIONAL SHORTENING: 38 % (ref 28–44)
GLUCOSE SERPL-MCNC: 97 MG/DL (ref 70–110)
HCT VFR BLD AUTO: 39.3 % (ref 37–48.5)
HGB BLD-MCNC: 12.6 G/DL (ref 12–16)
IMM GRANULOCYTES # BLD AUTO: 0.01 K/UL (ref 0–0.04)
IMM GRANULOCYTES NFR BLD AUTO: 0.1 % (ref 0–0.5)
INR PPP: 1 (ref 0.8–1.2)
INTERVENTRICULAR SEPTUM: 0.81 CM (ref 0.6–1.1)
IVRT: 87.66 MSEC
LA MAJOR: 4.94 CM
LA MINOR: 5.53 CM
LA WIDTH: 4.1 CM
LEFT ATRIUM SIZE: 3.94 CM
LEFT ATRIUM VOLUME INDEX: 35.8 ML/M2
LEFT ATRIUM VOLUME: 71.65 CM3
LEFT INTERNAL DIMENSION IN SYSTOLE: 2.98 CM (ref 2.1–4)
LEFT VENTRICLE DIASTOLIC VOLUME INDEX: 53 ML/M2
LEFT VENTRICLE DIASTOLIC VOLUME: 106.04 ML
LEFT VENTRICLE MASS INDEX: 68 G/M2
LEFT VENTRICLE SYSTOLIC VOLUME INDEX: 17.3 ML/M2
LEFT VENTRICLE SYSTOLIC VOLUME: 34.54 ML
LEFT VENTRICULAR INTERNAL DIMENSION IN DIASTOLE: 4.77 CM (ref 3.5–6)
LEFT VENTRICULAR MASS: 135.64 G
LV LATERAL E/E' RATIO: 12.22 M/S
LV SEPTAL E/E' RATIO: 15.71 M/S
LYMPHOCYTES # BLD AUTO: 1.8 K/UL (ref 1–4.8)
LYMPHOCYTES NFR BLD: 24.5 % (ref 18–48)
MCH RBC QN AUTO: 31.6 PG (ref 27–31)
MCHC RBC AUTO-ENTMCNC: 32.1 G/DL (ref 32–36)
MCV RBC AUTO: 99 FL (ref 82–98)
MONOCYTES # BLD AUTO: 0.7 K/UL (ref 0.3–1)
MONOCYTES NFR BLD: 9 % (ref 4–15)
MV PEAK A VEL: 0.94 M/S
MV PEAK E VEL: 1.1 M/S
MV STENOSIS PRESSURE HALF TIME: 121.16 MS
MV VALVE AREA P 1/2 METHOD: 1.82 CM2
NEUTROPHILS # BLD AUTO: 4.6 K/UL (ref 1.8–7.7)
NEUTROPHILS NFR BLD: 62.8 % (ref 38–73)
NRBC BLD-RTO: 0 /100 WBC
PISA TR MAX VEL: 2.89 M/S
PLATELET # BLD AUTO: 181 K/UL (ref 150–350)
PMV BLD AUTO: 11.4 FL (ref 9.2–12.9)
POTASSIUM SERPL-SCNC: 4 MMOL/L (ref 3.5–5.1)
PROTHROMBIN TIME: 10.6 SEC (ref 9–12.5)
PV PEAK VELOCITY: 1.1 CM/S
RA MAJOR: 4.82 CM
RA PRESSURE: 3 MMHG
RA WIDTH: 5.16 CM
RBC # BLD AUTO: 3.99 M/UL (ref 4–5.4)
RIGHT VENTRICULAR END-DIASTOLIC DIMENSION: 4.34 CM
RV TISSUE DOPPLER FREE WALL SYSTOLIC VELOCITY 1 (APICAL 4 CHAMBER VIEW): 12.81 CM/S
SARS-COV-2 RDRP RESP QL NAA+PROBE: NEGATIVE
SINUS: 3.09 CM
SODIUM SERPL-SCNC: 144 MMOL/L (ref 136–145)
STJ: 2.6 CM
TDI LATERAL: 0.09 M/S
TDI SEPTAL: 0.07 M/S
TDI: 0.08 M/S
TR MAX PG: 33 MMHG
TRICUSPID ANNULAR PLANE SYSTOLIC EXCURSION: 3.05 CM
TV REST PULMONARY ARTERY PRESSURE: 36 MMHG
WBC # BLD AUTO: 7.31 K/UL (ref 3.9–12.7)

## 2020-09-21 PROCEDURE — 85610 PROTHROMBIN TIME: CPT

## 2020-09-21 PROCEDURE — 85025 COMPLETE CBC W/AUTO DIFF WBC: CPT

## 2020-09-21 PROCEDURE — U0002 COVID-19 LAB TEST NON-CDC: HCPCS

## 2020-09-21 PROCEDURE — 93306 TTE W/DOPPLER COMPLETE: CPT

## 2020-09-21 PROCEDURE — 93306 TTE W/DOPPLER COMPLETE: CPT | Mod: 26,,, | Performed by: INTERNAL MEDICINE

## 2020-09-21 PROCEDURE — 80048 BASIC METABOLIC PNL TOTAL CA: CPT

## 2020-09-21 PROCEDURE — 36415 COLL VENOUS BLD VENIPUNCTURE: CPT

## 2020-09-21 PROCEDURE — 93306 ECHO (CUPID ONLY): ICD-10-PCS | Mod: 26,,, | Performed by: INTERNAL MEDICINE

## 2020-09-21 NOTE — DISCHARGE INSTRUCTIONS
"Your angiogram is scheduled for__9/23/2020_____________    Call [phone] between 2pm and 5pm on _9/22/2020___________to find out your arrival time for the day of your procedure.    Report to Same Day Surgery Unit at ____ AM on the 2nd floor of the hospital.  Use the front entrance of the hospital.  The front doors of the hospital open promptly at 5:30am.  If you need wheelchair assistance, call [phone] from your cell phone, or call "0" from the courtesy phone in the lobby.    IMPORTANT INSTRUCTIONS:  Do not eat or drink anything after midnight.  This includes water and coffee.  It is okay to brush your teeth.  Do not chew gum or have hard candy or mints.    Take your morning medications as instructed with small sips of water.       Shower the night before your procedure and the morning of your procedure with Hibiclens as directed.     Do not wear make-up.     You may wear deodorant, but do not wear body lotion, powder or perfume/cologne       Do not wear jewelry.     You will be asked to remove any dentures or partials for the procedure.     You do not need money or valuables.  You may bring your cell phone.     If you are going home the same day, you must arrange for someone to drive you home.     Wear comfortable, loose fitting clothes.      Call your doctor if you have sickness or fever before your angiogram.     Our number in [facility] is [phone] if you need to contact us.     If you are going home the same day, you must arrange for a family member or a friend to drive you home.  Public transportation is prohibited.  "

## 2020-09-23 ENCOUNTER — HOSPITAL ENCOUNTER (OUTPATIENT)
Facility: HOSPITAL | Age: 80
Discharge: HOME | End: 2020-09-23
Attending: INTERNAL MEDICINE | Admitting: INTERNAL MEDICINE
Payer: MEDICARE

## 2020-09-23 VITALS
OXYGEN SATURATION: 94 % | SYSTOLIC BLOOD PRESSURE: 153 MMHG | DIASTOLIC BLOOD PRESSURE: 70 MMHG | WEIGHT: 216.69 LBS | TEMPERATURE: 98 F | RESPIRATION RATE: 18 BRPM | BODY MASS INDEX: 38.39 KG/M2 | HEART RATE: 46 BPM

## 2020-09-23 DIAGNOSIS — Z01.818 PREOP TESTING: ICD-10-CM

## 2020-09-23 DIAGNOSIS — I25.2 HISTORY OF NON-ST ELEVATION MYOCARDIAL INFARCTION (NSTEMI): ICD-10-CM

## 2020-09-23 DIAGNOSIS — I25.119 CHEST PAIN DUE TO CAD: ICD-10-CM

## 2020-09-23 DIAGNOSIS — I25.110 CORONARY ARTERY DISEASE INVOLVING NATIVE CORONARY ARTERY OF NATIVE HEART WITH UNSTABLE ANGINA PECTORIS: Primary | ICD-10-CM

## 2020-09-23 DIAGNOSIS — R94.39 ABNORMAL NUCLEAR STRESS TEST: ICD-10-CM

## 2020-09-23 DIAGNOSIS — R06.09 DOE (DYSPNEA ON EXERTION): ICD-10-CM

## 2020-09-23 PROCEDURE — 93458 PR CATH PLACE/CORON ANGIO, IMG SUPER/INTERP,W LEFT HEART VENTRICULOGRAPHY: ICD-10-PCS | Mod: 26,,, | Performed by: INTERNAL MEDICINE

## 2020-09-23 PROCEDURE — 99152 MOD SED SAME PHYS/QHP 5/>YRS: CPT | Mod: ,,, | Performed by: INTERNAL MEDICINE

## 2020-09-23 PROCEDURE — C1887 CATHETER, GUIDING: HCPCS | Performed by: INTERNAL MEDICINE

## 2020-09-23 PROCEDURE — C1769 GUIDE WIRE: HCPCS | Performed by: INTERNAL MEDICINE

## 2020-09-23 PROCEDURE — 25500020 PHARM REV CODE 255: Performed by: INTERNAL MEDICINE

## 2020-09-23 PROCEDURE — 93458 L HRT ARTERY/VENTRICLE ANGIO: CPT | Performed by: INTERNAL MEDICINE

## 2020-09-23 PROCEDURE — 93458 L HRT ARTERY/VENTRICLE ANGIO: CPT | Mod: 26,,, | Performed by: INTERNAL MEDICINE

## 2020-09-23 PROCEDURE — C1894 INTRO/SHEATH, NON-LASER: HCPCS | Performed by: INTERNAL MEDICINE

## 2020-09-23 PROCEDURE — 25000003 PHARM REV CODE 250: Performed by: INTERNAL MEDICINE

## 2020-09-23 PROCEDURE — 99152 PR MOD CONSCIOUS SEDATION, SAME PHYS, 5+ YRS, FIRST 15 MIN: ICD-10-PCS | Mod: ,,, | Performed by: INTERNAL MEDICINE

## 2020-09-23 PROCEDURE — 63600175 PHARM REV CODE 636 W HCPCS: Performed by: INTERNAL MEDICINE

## 2020-09-23 PROCEDURE — 99152 MOD SED SAME PHYS/QHP 5/>YRS: CPT | Performed by: INTERNAL MEDICINE

## 2020-09-23 RX ORDER — LIDOCAINE HYDROCHLORIDE 10 MG/ML
INJECTION, SOLUTION EPIDURAL; INFILTRATION; INTRACAUDAL; PERINEURAL
Status: DISCONTINUED | OUTPATIENT
Start: 2020-09-23 | End: 2020-09-23 | Stop reason: HOSPADM

## 2020-09-23 RX ORDER — ACETAMINOPHEN 325 MG/1
650 TABLET ORAL EVERY 4 HOURS PRN
Status: DISCONTINUED | OUTPATIENT
Start: 2020-09-23 | End: 2020-09-23 | Stop reason: HOSPADM

## 2020-09-23 RX ORDER — NITROGLYCERIN 20 MG/100ML
INJECTION INTRAVENOUS
Status: DISCONTINUED | OUTPATIENT
Start: 2020-09-23 | End: 2020-09-23 | Stop reason: HOSPADM

## 2020-09-23 RX ORDER — ONDANSETRON 2 MG/ML
4 INJECTION INTRAMUSCULAR; INTRAVENOUS EVERY 12 HOURS PRN
Status: DISCONTINUED | OUTPATIENT
Start: 2020-09-23 | End: 2020-09-23 | Stop reason: HOSPADM

## 2020-09-23 RX ORDER — HEPARIN SODIUM 1000 [USP'U]/ML
INJECTION, SOLUTION INTRAVENOUS; SUBCUTANEOUS
Status: DISCONTINUED | OUTPATIENT
Start: 2020-09-23 | End: 2020-09-23 | Stop reason: HOSPADM

## 2020-09-23 RX ORDER — SODIUM CHLORIDE 9 MG/ML
3 INJECTION, SOLUTION INTRAVENOUS CONTINUOUS
Status: ACTIVE | OUTPATIENT
Start: 2020-09-23 | End: 2020-09-23

## 2020-09-23 RX ORDER — VERAPAMIL HYDROCHLORIDE 2.5 MG/ML
INJECTION, SOLUTION INTRAVENOUS
Status: DISCONTINUED | OUTPATIENT
Start: 2020-09-23 | End: 2020-09-23 | Stop reason: HOSPADM

## 2020-09-23 RX ORDER — MORPHINE SULFATE 10 MG/ML
2 INJECTION INTRAMUSCULAR; INTRAVENOUS; SUBCUTANEOUS EVERY 10 MIN PRN
Status: DISCONTINUED | OUTPATIENT
Start: 2020-09-23 | End: 2020-09-23 | Stop reason: HOSPADM

## 2020-09-23 RX ORDER — FENTANYL CITRATE 50 UG/ML
INJECTION, SOLUTION INTRAMUSCULAR; INTRAVENOUS
Status: DISCONTINUED | OUTPATIENT
Start: 2020-09-23 | End: 2020-09-23 | Stop reason: HOSPADM

## 2020-09-23 RX ORDER — ATROPINE SULFATE 0.1 MG/ML
0.5 INJECTION INTRAVENOUS
Status: DISCONTINUED | OUTPATIENT
Start: 2020-09-23 | End: 2020-09-23 | Stop reason: HOSPADM

## 2020-09-23 RX ORDER — MIDAZOLAM HYDROCHLORIDE 1 MG/ML
INJECTION, SOLUTION INTRAMUSCULAR; INTRAVENOUS
Status: DISCONTINUED | OUTPATIENT
Start: 2020-09-23 | End: 2020-09-23 | Stop reason: HOSPADM

## 2020-09-23 RX ADMIN — SODIUM CHLORIDE 5 ML/KG/HR: 0.9 INJECTION, SOLUTION INTRAVENOUS at 02:09

## 2020-09-23 RX ADMIN — SODIUM CHLORIDE 5 ML/KG/HR: 0.9 INJECTION, SOLUTION INTRAVENOUS at 04:09

## 2020-09-23 RX ADMIN — SODIUM CHLORIDE 3 ML/KG/HR: 0.9 INJECTION, SOLUTION INTRAVENOUS at 11:09

## 2020-09-23 RX ADMIN — SODIUM CHLORIDE 5 ML/KG/HR: 0.9 INJECTION, SOLUTION INTRAVENOUS at 01:09

## 2020-09-23 NOTE — DISCHARGE INSTRUCTIONS
Post angiogram through  wrist     Limit movement of the wrist.  Do not bend wrist or perform heavy lifting for 24 hours.      NO blood pressure cuff or venipuncture to affected arm for 24 hours.    If bleeding occurs, apply pressure to site for 20 minutes. Apply band aid once bleeding stops.  Call 911 if unable to stop bleeding with pressure.     Keep your follow up appointment.      Do not drive, drink alcohol, or sign legal documents for 24 hours, or if taking narcotic pain medication.            Fall Prevention  Millions of people fall every year and injure themselves. You may have had anesthesia or sedation which may increase your risk of falling. You may have health issues that put you at an increased risk of falling.     Here are ways to reduce your risk of falling.  ·   · Make your home safe by keeping walkways clear of objects you may trip over.  · Use non-slip pads under rugs. Do not use area rugs or small throw rugs.  · Use non-slip mats in bathtubs and showers.  · Install handrails and lights on staircases.  · Do not walk in poorly lit areas.  · Do not stand on chairs or wobbly ladders.  · Use caution when reaching overhead or looking upward. This position can cause a loss of balance.  · Be sure your shoes fit properly, have non-slip bottoms and are in good condition.   · Wear shoes both inside and out. Avoid going barefoot or wearing slippers.  · Be cautious when going up and down stairs, curbs, and when walking on uneven sidewalks.  · If your balance is poor, consider using a cane or walker.  · If your fall was related to alcohol use, stop or limit alcohol intake.   · If your fall was related to use of sleeping medicines, talk to your doctor about this. You may need to reduce your dosage at bedtime if you awaken during the night to go to the bathroom.    · To reduce the need for nighttime bathroom trips:  ¨ Avoid drinking fluids for several hours before going to bed  ¨ Empty your bladder before going  to bed  ¨ Men can keep a urinal at the bedside  · Stay as active as you can. Balance, flexibility, strength, and endurance all come from exercise. They all play a role in preventing falls. Ask your healthcare provider which types of activity are right for you.  · Get your vision checked on a regular basis.  · If you have pets, know where they are before you stand up or walk so you don't trip over them.  · Use night lights.

## 2020-09-23 NOTE — Clinical Note
Catheter is repositioned to the right coronary artery. Angiography performed of the right coronary arteries in multiple views.

## 2020-09-23 NOTE — OP NOTE
[facility]  Brief Operative Note     SUMMARY     Surgery Date: 9/23/2020     Surgeon(s) and Role:     * Carlos Alberto Yadav MD - Primary    Assisting Surgeon: None    Pre-op Diagnosis:  Coronary artery disease involving native coronary artery of native heart with unstable angina pectoris [I25.110]Abnormal nuclear stress test [R94.39]History of non-ST elevation myocardial infarction (NSTEMI) [I25.2]NDIAYE (dyspnea on exertion) [R06.09]    Post-op Diagnosis:  Post-Op Diagnosis Codes:     * Coronary artery disease involving native coronary artery of native heart with unstable angina pectoris [I25.110]     * Abnormal nuclear stress test [R94.39]     * History of non-ST elevation myocardial infarction (NSTEMI) [I25.2]     * NDIAYE (dyspnea on exertion) [R06.09]    Procedure(s) (LRB):  Left heart cath 12pm start, R rad access (Left)    Anesthesia: RN IV Sedation    Description of the findings of the procedure: uneventful LHC/cor angio via R radial    Findings/Key Components:  LVEDP: 11mmHg  LVEF: 65% by echo    Dominance: Right  LM: normal  LAD: patent prox stent (7/25/19 2.75x26 Resolute [place] ROBERTO)  LCx: normal  RCA: dom, prox 40-50%, unchanged vs angio 10/15/19    Hemostasis:  R Radial band    Impression:  CP reminiscent of prior angina  2V CAD, nromal LV fxn  Widely patent prox LAD stent, stable moderate RCA stenosis (unchanged vs angio 10/15/19, neg iFR at that time)  R rad vasband for hemostasis    Plan:  Cont ASA (prior desentization successful)  Stop Plavix  Cont statin  Home today  Follow up with Dr. Yadav as planned  Outpatient GI/pulm eval    Estimated Blood Loss: <50cc         Specimens: None

## 2020-09-23 NOTE — DISCHARGE SUMMARY
[facility]  Discharge Note    SUMMARY     Admit Date: 9/23/2020    Discharge Date and Time:  09/23/2020 4 PM    Hospital Course (synopsis of major diagnoses, care, treatment, and services provided during the course of the hospital stay): Uneventful LHC/cor angio via R radial.  Stable CAD noted.    Final Diagnosis: Post-Op Diagnosis Codes:     * Coronary artery disease involving native coronary artery of native heart with unstable angina pectoris [I25.110]     * Abnormal nuclear stress test [R94.39]     * History of non-ST elevation myocardial infarction (NSTEMI) [I25.2]     * NDIAYE (dyspnea on exertion) [R06.09]    Disposition: Home or Self Care    Follow Up/Patient Instructions:     Medications:  Reconciled Home Medications:      Medication List      CONTINUE taking these medications    aspirin 81 MG EC tablet  Commonly known as: ECOTRIN  Take 1 tablet (81 mg total) by mouth once daily.     atorvastatin 80 MG tablet  Commonly known as: LIPITOR  Take 1 tablet (80 mg total) by mouth once daily.     diphenhydrAMINE 25 mg capsule  Commonly known as: BENADRYL  Take 1 each (25 mg total) by mouth every 6 (six) hours as needed for Itching.     furosemide 20 MG tablet  Commonly known as: LASIX  Take 20 mg by mouth 2 (two) times daily.     HYDROcodone-acetaminophen 5-325 mg per tablet  Commonly known as: NORCO  Take 1 tablet by mouth every 6 (six) hours as needed for Pain.     levothyroxine 100 MCG tablet  Commonly known as: SYNTHROID  Take 100 mcg by mouth once daily.     meclizine 25 mg tablet  Commonly known as: ANTIVERT  Take 1 tablet (25 mg total) by mouth 3 (three) times daily as needed for Dizziness.     metoprolol tartrate 25 MG tablet  Commonly known as: LOPRESSOR  Take 0.5 tablets (12.5 mg total) by mouth 2 (two) times daily.     nitroGLYCERIN 0.4 MG SL tablet  Commonly known as: NITROSTAT  Place 1 tablet (0.4 mg total) under the tongue every 5 (five) minutes as needed for Chest pain.      sertraline 100 MG tablet  Commonly known as: ZOLOFT  Take 100 mg by mouth once daily.     VITAMIN D2 50,000 unit Cap  Generic drug: ergocalciferol  Take 50,000 Units by mouth every 7 days. Taking on Monday's        STOP taking these medications    clopidogreL 75 mg tablet  Commonly known as: PLAVIX          Discharge Procedure Orders   Diet Cardiac     Call MD for:  temperature >100.4     Call MD for:  persistent nausea and vomiting     Call MD for:  severe uncontrolled pain     Call MD for:  difficulty breathing, headache or visual disturbances     Call MD for:  redness, tenderness, or signs of infection (pain, swelling, redness, odor or green/yellow discharge around incision site)     Call MD for:  hives     Call MD for:  persistent dizziness or light-headedness     Call MD for:  extreme fatigue     Remove dressing in 24 hours     Activity as tolerated     Follow-up Information     Carlos Alberto Yadav MD On 10/8/2020.    Specialties: Cardiology, INTERVENTIONAL CARDIOLOGY  Why: 11am for follow up  Contact information:  [address] [facility]  [address]  [facility] [address]  [phone]                     Diet: cardiac    Activity: ad kriss.

## 2020-09-23 NOTE — INTERVAL H&P NOTE
The patient has been examined and the H&P has been reviewed:    I concur with the findings and no changes have occurred since H&P was written.    Anesthesia/Surgery risks, benefits and alternative options discussed and understood by patient/family.    Lab Results   Component Value Date    WBC 7.31 09/21/2020    HGB 12.6 09/21/2020    HCT 39.3 09/21/2020    MCV 99 (H) 09/21/2020     09/21/2020       Lab Results   Component Value Date    INR 1.0 09/21/2020    INR 1.0 10/11/2019    INR 1.0 04/16/2018     BMP  Lab Results   Component Value Date     09/21/2020    K 4.0 09/21/2020     09/21/2020    CO2 29 09/21/2020    BUN 17 09/21/2020    CREATININE 1.4 09/21/2020    CALCIUM 9.8 09/21/2020    ANIONGAP 11 09/21/2020    ESTGFRAFRICA 41 (A) 09/21/2020    EGFRNONAA 35 (A) 09/21/2020           Active Hospital Problems    Diagnosis  POA    Coronary artery disease involving native coronary artery of native heart with unstable angina pectoris [I25.110]  Yes      Resolved Hospital Problems   No resolved problems to display.

## 2020-09-23 NOTE — Clinical Note
Catheter is repositioned to the and hemodynamics recorded ostium   left main. Angiography performed of the left coronary arteries in multiple views.

## 2020-09-23 NOTE — Clinical Note
29 ml injected throughout the case. 71 mL total wasted during the case. 100 mL total used in the case.

## 2020-12-18 ENCOUNTER — HOSPITAL ENCOUNTER (EMERGENCY)
Facility: HOSPITAL | Age: 80
Discharge: HOME | End: 2020-12-18
Attending: EMERGENCY MEDICINE
Payer: MEDICARE

## 2020-12-18 VITALS
RESPIRATION RATE: 21 BRPM | HEART RATE: 90 BPM | HEIGHT: 63 IN | BODY MASS INDEX: 37.5 KG/M2 | OXYGEN SATURATION: 97 % | SYSTOLIC BLOOD PRESSURE: 164 MMHG | TEMPERATURE: 98 F | DIASTOLIC BLOOD PRESSURE: 65 MMHG | WEIGHT: 211.63 LBS

## 2020-12-18 DIAGNOSIS — R05.9 COUGH: ICD-10-CM

## 2020-12-18 DIAGNOSIS — Z20.822 SUSPECTED COVID-19 VIRUS INFECTION: ICD-10-CM

## 2020-12-18 DIAGNOSIS — R19.7 DIARRHEA, UNSPECIFIED TYPE: ICD-10-CM

## 2020-12-18 DIAGNOSIS — J18.9 PNEUMONIA DUE TO INFECTIOUS ORGANISM, UNSPECIFIED LATERALITY, UNSPECIFIED PART OF LUNG: Primary | ICD-10-CM

## 2020-12-18 LAB
ALBUMIN SERPL BCP-MCNC: 3.9 G/DL (ref 3.5–5.2)
ALP SERPL-CCNC: 86 U/L (ref 55–135)
ALT SERPL W/O P-5'-P-CCNC: 14 U/L (ref 10–44)
ANION GAP SERPL CALC-SCNC: 11 MMOL/L (ref 8–16)
AST SERPL-CCNC: 17 U/L (ref 10–40)
BASOPHILS # BLD AUTO: 0.02 K/UL (ref 0–0.2)
BASOPHILS NFR BLD: 0.2 % (ref 0–1.9)
BILIRUB SERPL-MCNC: 0.6 MG/DL (ref 0.1–1)
BNP SERPL-MCNC: 215 PG/ML (ref 0–99)
BUN SERPL-MCNC: 17 MG/DL (ref 8–23)
CALCIUM SERPL-MCNC: 9.5 MG/DL (ref 8.7–10.5)
CHLORIDE SERPL-SCNC: 107 MMOL/L (ref 95–110)
CK SERPL-CCNC: 63 U/L (ref 20–180)
CO2 SERPL-SCNC: 24 MMOL/L (ref 23–29)
CREAT SERPL-MCNC: 1.3 MG/DL (ref 0.5–1.4)
CRP SERPL-MCNC: 5 MG/L (ref 0–8.2)
CTP QC/QA: YES
DIFFERENTIAL METHOD: ABNORMAL
EOSINOPHIL # BLD AUTO: 0.1 K/UL (ref 0–0.5)
EOSINOPHIL NFR BLD: 0.8 % (ref 0–8)
ERYTHROCYTE [DISTWIDTH] IN BLOOD BY AUTOMATED COUNT: 13.7 % (ref 11.5–14.5)
EST. GFR  (AFRICAN AMERICAN): 44 ML/MIN/1.73 M^2
EST. GFR  (NON AFRICAN AMERICAN): 39 ML/MIN/1.73 M^2
FERRITIN SERPL-MCNC: 62 NG/ML (ref 20–300)
GLUCOSE SERPL-MCNC: 137 MG/DL (ref 70–110)
HCT VFR BLD AUTO: 43.1 % (ref 37–48.5)
HGB BLD-MCNC: 13.5 G/DL (ref 12–16)
IMM GRANULOCYTES # BLD AUTO: 0.04 K/UL (ref 0–0.04)
IMM GRANULOCYTES NFR BLD AUTO: 0.4 % (ref 0–0.5)
LACTATE SERPL-SCNC: 1.9 MMOL/L (ref 0.5–2.2)
LDH SERPL L TO P-CCNC: 249 U/L (ref 110–260)
LYMPHOCYTES # BLD AUTO: 1.6 K/UL (ref 1–4.8)
LYMPHOCYTES NFR BLD: 15.2 % (ref 18–48)
MCH RBC QN AUTO: 30.5 PG (ref 27–31)
MCHC RBC AUTO-ENTMCNC: 31.3 G/DL (ref 32–36)
MCV RBC AUTO: 98 FL (ref 82–98)
MONOCYTES # BLD AUTO: 0.5 K/UL (ref 0.3–1)
MONOCYTES NFR BLD: 4.5 % (ref 4–15)
NEUTROPHILS # BLD AUTO: 8.4 K/UL (ref 1.8–7.7)
NEUTROPHILS NFR BLD: 78.9 % (ref 38–73)
NRBC BLD-RTO: 0 /100 WBC
PLATELET # BLD AUTO: 152 K/UL (ref 150–350)
PMV BLD AUTO: 11.7 FL (ref 9.2–12.9)
POTASSIUM SERPL-SCNC: 3.9 MMOL/L (ref 3.5–5.1)
PROCALCITONIN SERPL IA-MCNC: 0.07 NG/ML
PROT SERPL-MCNC: 7.3 G/DL (ref 6–8.4)
RBC # BLD AUTO: 4.42 M/UL (ref 4–5.4)
SARS-COV-2 RDRP RESP QL NAA+PROBE: NEGATIVE
SODIUM SERPL-SCNC: 142 MMOL/L (ref 136–145)
TROPONIN I SERPL DL<=0.01 NG/ML-MCNC: 0.03 NG/ML (ref 0–0.03)
WBC # BLD AUTO: 10.62 K/UL (ref 3.9–12.7)

## 2020-12-18 PROCEDURE — 83605 ASSAY OF LACTIC ACID: CPT

## 2020-12-18 PROCEDURE — 84145 PROCALCITONIN (PCT): CPT

## 2020-12-18 PROCEDURE — 83615 LACTATE (LD) (LDH) ENZYME: CPT

## 2020-12-18 PROCEDURE — 87040 BLOOD CULTURE FOR BACTERIA: CPT

## 2020-12-18 PROCEDURE — 93010 ELECTROCARDIOGRAM REPORT: CPT | Mod: ,,, | Performed by: INTERNAL MEDICINE

## 2020-12-18 PROCEDURE — 82550 ASSAY OF CK (CPK): CPT

## 2020-12-18 PROCEDURE — 93005 ELECTROCARDIOGRAM TRACING: CPT

## 2020-12-18 PROCEDURE — 94640 AIRWAY INHALATION TREATMENT: CPT

## 2020-12-18 PROCEDURE — 86140 C-REACTIVE PROTEIN: CPT

## 2020-12-18 PROCEDURE — 99285 EMERGENCY DEPT VISIT HI MDM: CPT | Mod: 25

## 2020-12-18 PROCEDURE — U0003 INFECTIOUS AGENT DETECTION BY NUCLEIC ACID (DNA OR RNA); SEVERE ACUTE RESPIRATORY SYNDROME CORONAVIRUS 2 (SARS-COV-2) (CORONAVIRUS DISEASE [COVID-19]), AMPLIFIED PROBE TECHNIQUE, MAKING USE OF HIGH THROUGHPUT TECHNOLOGIES AS DESCRIBED BY CMS-2020-01-R: HCPCS

## 2020-12-18 PROCEDURE — 93010 EKG 12-LEAD: ICD-10-PCS | Mod: ,,, | Performed by: INTERNAL MEDICINE

## 2020-12-18 PROCEDURE — 82728 ASSAY OF FERRITIN: CPT

## 2020-12-18 PROCEDURE — 84484 ASSAY OF TROPONIN QUANT: CPT

## 2020-12-18 PROCEDURE — 25000242 PHARM REV CODE 250 ALT 637 W/ HCPCS: Performed by: EMERGENCY MEDICINE

## 2020-12-18 PROCEDURE — 83880 ASSAY OF NATRIURETIC PEPTIDE: CPT

## 2020-12-18 PROCEDURE — 36000 PLACE NEEDLE IN VEIN: CPT

## 2020-12-18 PROCEDURE — 80053 COMPREHEN METABOLIC PANEL: CPT

## 2020-12-18 PROCEDURE — U0002 COVID-19 LAB TEST NON-CDC: HCPCS | Performed by: EMERGENCY MEDICINE

## 2020-12-18 PROCEDURE — 85025 COMPLETE CBC W/AUTO DIFF WBC: CPT

## 2020-12-18 PROCEDURE — 25000003 PHARM REV CODE 250: Performed by: EMERGENCY MEDICINE

## 2020-12-18 RX ORDER — AZITHROMYCIN 250 MG/1
TABLET, FILM COATED ORAL
Qty: 6 TABLET | Refills: 0 | OUTPATIENT
Start: 2020-12-18 | End: 2021-05-01

## 2020-12-18 RX ORDER — PROMETHAZINE HYDROCHLORIDE AND DEXTROMETHORPHAN HYDROBROMIDE 6.25; 15 MG/5ML; MG/5ML
5 SYRUP ORAL EVERY 4 HOURS PRN
Qty: 120 ML | Refills: 0 | Status: SHIPPED | OUTPATIENT
Start: 2020-12-18 | End: 2020-12-28

## 2020-12-18 RX ORDER — IPRATROPIUM BROMIDE AND ALBUTEROL SULFATE 2.5; .5 MG/3ML; MG/3ML
3 SOLUTION RESPIRATORY (INHALATION)
Status: COMPLETED | OUTPATIENT
Start: 2020-12-18 | End: 2020-12-18

## 2020-12-18 RX ORDER — ACETAMINOPHEN 500 MG
1000 TABLET ORAL
Status: COMPLETED | OUTPATIENT
Start: 2020-12-18 | End: 2020-12-18

## 2020-12-18 RX ORDER — LOPERAMIDE HYDROCHLORIDE 2 MG/1
4 CAPSULE ORAL
Status: COMPLETED | OUTPATIENT
Start: 2020-12-18 | End: 2020-12-18

## 2020-12-18 RX ORDER — ALBUTEROL SULFATE 90 UG/1
1-2 AEROSOL, METERED RESPIRATORY (INHALATION) EVERY 6 HOURS PRN
Qty: 18 G | Refills: 0 | OUTPATIENT
Start: 2020-12-18 | End: 2021-05-01

## 2020-12-18 RX ADMIN — ACETAMINOPHEN 1000 MG: 500 TABLET ORAL at 05:12

## 2020-12-18 RX ADMIN — IPRATROPIUM BROMIDE AND ALBUTEROL SULFATE 3 ML: .5; 3 SOLUTION RESPIRATORY (INHALATION) at 02:12

## 2020-12-18 RX ADMIN — LOPERAMIDE HYDROCHLORIDE 4 MG: 2 CAPSULE ORAL at 05:12

## 2020-12-18 NOTE — ED TRIAGE NOTES
Pt arrived to ED via EMS with c/o of N/V/D, SOB,  Body aches, and a productive cough that started 2 days ago but worsen on this morning. Pt denies chest pain, fever.

## 2020-12-18 NOTE — DISCHARGE INSTRUCTIONS
Another coronavirus test that may take 2-4 days to result has been sent.  I a.m. signing up for the coronavirus monitoring system in case this turns positive. Over the counter Imodium as directed on the packaging for diarrhea.  Over-the-counter Tylenol as directed on the packaging for fever or body aches or headache

## 2020-12-18 NOTE — ED NOTES
Patient call to use bathroom, went into room to ass ist, was taking off equipment, went offered bed pan, patient refused states needs to walk to BR, patient walked to BR buy self, NND, had loose stool on her. Wipes given and assisted  patient back to bed, hooked back up to monitors.

## 2020-12-20 LAB — SARS-COV-2 RNA RESP QL NAA+PROBE: NOT DETECTED

## 2020-12-20 NOTE — ED PROVIDER NOTES
Encounter Date: 12/18/2020       History     Chief Complaint   Patient presents with    COVID-19 Concerns     pt comes in via EMS from home with c/o SOB, cough, nausea x3 days. She denies recent exposure to COVID.      80-year-old female who presents complaining cough, shortness of breath, nausea, diarrhea x3 days.  Also headaches and body aches.  Patient has a history of coronary disease, hypertension, obesity, chronic kidney disease stage 3, arthritis.  Denies fever.  Cough is not productive.  No known sick contacts no chest pain.        Review of patient's allergies indicates:   Allergen Reactions    Keflex [cephalexin] Itching    Moxifloxacin Itching    Penicillins Hives    Codeine Itching and Nausea Only     Past Medical History:   Diagnosis Date    Arthritis     Chronic kidney disease     stage 3     Coronary artery disease     Hypertension     Obesity (BMI 30-39.9)     Thyroid disease      Past Surgical History:   Procedure Laterality Date    ABDOMINAL SURGERY      removal scar tissue abdomen    BACK SURGERY      x3    CHOLECYSTECTOMY      CORONARY ANGIOPLASTY  07/25/2019    prox LAD 2.75x26 Resolute [place] ROBERTO    HYSTERECTOMY      JOINT REPLACEMENT      Rt total knee    LEFT HEART CATHETERIZATION Left 7/25/2019    Procedure: Left heart cath R rad access;  Surgeon: Carlos Alberto Yadav MD;  Location: [facility] CATH LAB;  Service: Cardiology;  Laterality: Left;    LEFT HEART CATHETERIZATION Left 10/15/2019    Procedure: Left heart cath 10am start, R rad access;  Surgeon: Carlos Alberto Yadav MD;  Location: [facility] CATH LAB;  Service: Cardiology;  Laterality: Left;  RN PREOP 10/11/2019    LEFT HEART CATHETERIZATION Left 9/23/2020    Procedure: Left heart cath 12pm start, R rad access;  Surgeon: Carlos Alberto Yadav MD;  Location: [facility] CATH LAB;  Service: Cardiology;  Laterality: Left;  RN PREOP 9/21/2020-----COVID NEGATIVE ON 9/21    REVISION OF SCAR TISSUE RECTUS MUSCLE      after gall bladder surgery      History reviewed. No pertinent family history.  Social History     Tobacco Use    Smoking status: Former Smoker     Quit date:      Years since quittin.9    Smokeless tobacco: Never Used   Substance Use Topics    Alcohol use: No    Drug use: No     Review of Systems   Constitutional: Positive for fever.   HENT: Negative for sore throat.    Eyes: Negative for visual disturbance.   Respiratory: Positive for cough and shortness of breath.    Cardiovascular: Negative for chest pain, palpitations and leg swelling.   Gastrointestinal: Positive for diarrhea, nausea and vomiting. Negative for abdominal pain.   Genitourinary: Negative for difficulty urinating.   Musculoskeletal: Positive for myalgias. Negative for back pain.   Skin: Negative for rash.   Neurological: Negative for headaches.       Physical Exam     Initial Vitals [20 1248]   BP Pulse Resp Temp SpO2   (S) (!) 220/120 65 16 98.4 °F (36.9 °C) 95 %      MAP       --         Physical Exam    Nursing note and vitals reviewed.  Constitutional: She appears well-developed and well-nourished.   Eyes: EOM are normal. Pupils are equal, round, and reactive to light.   Neck: Normal range of motion. Neck supple. No thyromegaly present. No JVD present.   Cardiovascular: Normal rate, regular rhythm, normal heart sounds and intact distal pulses. Exam reveals no gallop and no friction rub.    No murmur heard.  Pulmonary/Chest: Breath sounds normal. No respiratory distress.   Abdominal: Soft. Bowel sounds are normal. There is no abdominal tenderness.   Musculoskeletal: Normal range of motion. No tenderness or edema.   Neurological: She is alert and oriented to person, place, and time. She has normal strength. GCS score is 15. GCS eye subscore is 4. GCS verbal subscore is 5. GCS motor subscore is 6.   Skin: Skin is warm and dry.         ED Course   Procedures  Labs Reviewed   CBC W/ AUTO DIFFERENTIAL - Abnormal; Notable for the following components:        Result Value    MCHC 31.3 (*)     Gran # (ANC) 8.4 (*)     Gran % 78.9 (*)     Lymph % 15.2 (*)     All other components within normal limits   COMPREHENSIVE METABOLIC PANEL - Abnormal; Notable for the following components:    Glucose 137 (*)     eGFR if  44 (*)     eGFR if non  39 (*)     All other components within normal limits   B-TYPE NATRIURETIC PEPTIDE - Abnormal; Notable for the following components:     (*)     All other components within normal limits   CULTURE, BLOOD    Narrative:     Aerobic and anaerobic   CULTURE, BLOOD    Narrative:     Aerobic and anaerobic   C-REACTIVE PROTEIN   FERRITIN   LACTATE DEHYDROGENASE   CK   LACTIC ACID, PLASMA   TROPONIN I   PROCALCITONIN   SARS-COV-2 (COVID-19) QUALITATIVE PCR   SARS-COV-2 RDRP GENE          Imaging Results          X-Ray Chest 1 View (Final result)  Result time 12/18/20 13:56:59    Final result by Dontae Vegas MD (12/18/20 13:56:59)                 Impression:      Suspected mild patchy pulmonary infiltrates at the left lung base superimposed on diminished depth of inspiration.      Electronically signed by: Dontae Vegas  Date:    12/18/2020  Time:    13:56             Narrative:    EXAMINATION:  XR CHEST 1 VIEW    CLINICAL HISTORY:  Cough    TECHNIQUE:  Single frontal view of the chest was performed.    COMPARISON:  July 24, 2019    FINDINGS:  Single chest view is submitted.  There is mild elevation of the right hemidiaphragm noted.  There is minimal rotation present, this exaggerates the appearance of the cardiomediastinal silhouette which is otherwise thought stable when accounting for the aforementioned and mild diminished depth of inspiration.  There is appearance of mild accentuated pulmonary markings at the left lung base, this may relate to mild patchy pulmonary infiltrates, there is no dense consolidation, there is no significant pleural effusion or pneumothorax.  The osseous structures demonstrate  chronic change.                             A patient has allergies to cephalosporins, fluoroquinolones, penicillins.  Limits treatment to azithromycin and doxycycline.  Doxycycline has a greater likelihood of GI side effects.  Patient already having nausea vomiting diarrhea.  Left lower lobe infiltrate is questionable.  Patient has fever but no leukocytosis.  I still have a high concern for coronavirus.  Will send off PCR testing.  Treat with azithromycin.  Also cough medicine no attending antiemetic.  Patient return for new or worse symptoms.                                 Clinical Impression:       ICD-10-CM ICD-9-CM   1. Pneumonia due to infectious organism, unspecified laterality, unspecified part of lung  J18.9 486   2. Cough  R05 786.2   3. Suspected COVID-19 virus infection  Z20.828 V01.79   4. Diarrhea, unspecified type  R19.7 787.91                          ED Disposition Condition    Discharge Stable        ED Prescriptions     Medication Sig Dispense Start Date End Date Auth. Provider    azithromycin (Z-LORENZA) 250 MG tablet Take first 2 tablets together, then 1 every day until finished. 6 tablet 12/18/2020  Edmundo Wing MD    albuterol (PROVENTIL/VENTOLIN HFA) 90 mcg/actuation inhaler Inhale 1-2 puffs into the lungs every 6 (six) hours as needed for Wheezing. Rescue 18 g 12/18/2020  Edmundo Wing MD    promethazine-dextromethorphan (PROMETHAZINE-DM) 6.25-15 mg/5 mL Syrp Take 5 mLs by mouth every 4 (four) hours as needed. May cause drowsiness 120 mL 12/18/2020 12/28/2020 Edmundo Wing MD        Follow-up Information     Follow up With Specialties Details Why Contact Info    Marii Bauer MD Internal Medicine  monday for recheck. [address]  [facility] [address]  [phone]      [facility] Emergency [facility]  as needed for worsening. [address]  [facility] [address] [phone]                                       Edmundo VAZQUEZ  MD [place]  12/19/20 5416

## 2020-12-23 ENCOUNTER — TELEPHONE (OUTPATIENT)
Dept: PRIMARY CARE CLINIC | Facility: OTHER | Age: 80
End: 2020-12-23

## 2020-12-23 LAB
BACTERIA BLD CULT: NORMAL
BACTERIA BLD CULT: NORMAL

## 2021-05-01 ENCOUNTER — HOSPITAL ENCOUNTER (EMERGENCY)
Facility: HOSPITAL | Age: 81
Discharge: HOME | End: 2021-05-01
Attending: EMERGENCY MEDICINE
Payer: MEDICARE

## 2021-05-01 VITALS
TEMPERATURE: 98 F | HEART RATE: 64 BPM | WEIGHT: 210 LBS | SYSTOLIC BLOOD PRESSURE: 156 MMHG | DIASTOLIC BLOOD PRESSURE: 67 MMHG | HEIGHT: 64 IN | OXYGEN SATURATION: 98 % | BODY MASS INDEX: 35.85 KG/M2 | RESPIRATION RATE: 20 BRPM

## 2021-05-01 DIAGNOSIS — R07.9 CHEST PAIN: ICD-10-CM

## 2021-05-01 DIAGNOSIS — R06.02 SOB (SHORTNESS OF BREATH): ICD-10-CM

## 2021-05-01 DIAGNOSIS — J40 BRONCHITIS: Primary | ICD-10-CM

## 2021-05-01 LAB
ALBUMIN SERPL BCP-MCNC: 3.7 G/DL (ref 3.5–5.2)
ALP SERPL-CCNC: 66 U/L (ref 55–135)
ALT SERPL W/O P-5'-P-CCNC: 18 U/L (ref 10–44)
ANION GAP SERPL CALC-SCNC: 10 MMOL/L (ref 8–16)
AST SERPL-CCNC: 20 U/L (ref 10–40)
BASOPHILS # BLD AUTO: 0.02 K/UL (ref 0–0.2)
BASOPHILS NFR BLD: 0.2 % (ref 0–1.9)
BILIRUB SERPL-MCNC: 0.5 MG/DL (ref 0.1–1)
BUN SERPL-MCNC: 22 MG/DL (ref 8–23)
CALCIUM SERPL-MCNC: 9.7 MG/DL (ref 8.7–10.5)
CHLORIDE SERPL-SCNC: 104 MMOL/L (ref 95–110)
CO2 SERPL-SCNC: 26 MMOL/L (ref 23–29)
CREAT SERPL-MCNC: 1.5 MG/DL (ref 0.5–1.4)
CTP QC/QA: YES
CTP QC/QA: YES
DIFFERENTIAL METHOD: ABNORMAL
EOSINOPHIL # BLD AUTO: 0.2 K/UL (ref 0–0.5)
EOSINOPHIL NFR BLD: 1.9 % (ref 0–8)
ERYTHROCYTE [DISTWIDTH] IN BLOOD BY AUTOMATED COUNT: 13.8 % (ref 11.5–14.5)
EST. GFR  (AFRICAN AMERICAN): 37 ML/MIN/1.73 M^2
EST. GFR  (NON AFRICAN AMERICAN): 32 ML/MIN/1.73 M^2
GLUCOSE SERPL-MCNC: 109 MG/DL (ref 70–110)
HCT VFR BLD AUTO: 35.2 % (ref 37–48.5)
HGB BLD-MCNC: 11.5 G/DL (ref 12–16)
IMM GRANULOCYTES # BLD AUTO: 0.05 K/UL (ref 0–0.04)
IMM GRANULOCYTES NFR BLD AUTO: 0.5 % (ref 0–0.5)
LACTATE SERPL-SCNC: 0.7 MMOL/L (ref 0.5–2.2)
LYMPHOCYTES # BLD AUTO: 1.8 K/UL (ref 1–4.8)
LYMPHOCYTES NFR BLD: 18.5 % (ref 18–48)
MCH RBC QN AUTO: 31 PG (ref 27–31)
MCHC RBC AUTO-ENTMCNC: 32.7 G/DL (ref 32–36)
MCV RBC AUTO: 95 FL (ref 82–98)
MONOCYTES # BLD AUTO: 0.7 K/UL (ref 0.3–1)
MONOCYTES NFR BLD: 7.2 % (ref 4–15)
NEUTROPHILS # BLD AUTO: 6.8 K/UL (ref 1.8–7.7)
NEUTROPHILS NFR BLD: 71.7 % (ref 38–73)
NRBC BLD-RTO: 0 /100 WBC
PLATELET # BLD AUTO: 219 K/UL (ref 150–450)
PMV BLD AUTO: 10.9 FL (ref 9.2–12.9)
POC MOLECULAR INFLUENZA A AGN: NEGATIVE
POC MOLECULAR INFLUENZA B AGN: NEGATIVE
POTASSIUM SERPL-SCNC: 3.8 MMOL/L (ref 3.5–5.1)
PROCALCITONIN SERPL IA-MCNC: 0.05 NG/ML
PROT SERPL-MCNC: 7.2 G/DL (ref 6–8.4)
RBC # BLD AUTO: 3.71 M/UL (ref 4–5.4)
SARS-COV-2 RDRP RESP QL NAA+PROBE: NEGATIVE
SODIUM SERPL-SCNC: 140 MMOL/L (ref 136–145)
TROPONIN I SERPL DL<=0.01 NG/ML-MCNC: <0.006 NG/ML (ref 0–0.03)
WBC # BLD AUTO: 9.53 K/UL (ref 3.9–12.7)

## 2021-05-01 PROCEDURE — 84145 PROCALCITONIN (PCT): CPT | Performed by: EMERGENCY MEDICINE

## 2021-05-01 PROCEDURE — 99285 EMERGENCY DEPT VISIT HI MDM: CPT | Mod: 25

## 2021-05-01 PROCEDURE — U0002 COVID-19 LAB TEST NON-CDC: HCPCS | Performed by: EMERGENCY MEDICINE

## 2021-05-01 PROCEDURE — 96361 HYDRATE IV INFUSION ADD-ON: CPT

## 2021-05-01 PROCEDURE — 27100098 HC SPACER

## 2021-05-01 PROCEDURE — 25000242 PHARM REV CODE 250 ALT 637 W/ HCPCS: Performed by: EMERGENCY MEDICINE

## 2021-05-01 PROCEDURE — 94640 AIRWAY INHALATION TREATMENT: CPT

## 2021-05-01 PROCEDURE — 85025 COMPLETE CBC W/AUTO DIFF WBC: CPT | Performed by: EMERGENCY MEDICINE

## 2021-05-01 PROCEDURE — 84484 ASSAY OF TROPONIN QUANT: CPT | Performed by: EMERGENCY MEDICINE

## 2021-05-01 PROCEDURE — 80053 COMPREHEN METABOLIC PANEL: CPT | Performed by: EMERGENCY MEDICINE

## 2021-05-01 PROCEDURE — 87040 BLOOD CULTURE FOR BACTERIA: CPT | Performed by: EMERGENCY MEDICINE

## 2021-05-01 PROCEDURE — 96360 HYDRATION IV INFUSION INIT: CPT

## 2021-05-01 PROCEDURE — 83605 ASSAY OF LACTIC ACID: CPT | Performed by: EMERGENCY MEDICINE

## 2021-05-01 PROCEDURE — 93010 EKG 12-LEAD: ICD-10-PCS | Mod: ,,, | Performed by: INTERNAL MEDICINE

## 2021-05-01 PROCEDURE — 25000003 PHARM REV CODE 250: Performed by: EMERGENCY MEDICINE

## 2021-05-01 PROCEDURE — 93005 ELECTROCARDIOGRAM TRACING: CPT

## 2021-05-01 PROCEDURE — 93010 ELECTROCARDIOGRAM REPORT: CPT | Mod: ,,, | Performed by: INTERNAL MEDICINE

## 2021-05-01 PROCEDURE — 94761 N-INVAS EAR/PLS OXIMETRY MLT: CPT

## 2021-05-01 RX ORDER — LISINOPRIL 5 MG/1
5 TABLET ORAL DAILY
COMMUNITY
Start: 2021-02-19

## 2021-05-01 RX ORDER — GUAIFENESIN/DEXTROMETHORPHAN 100-10MG/5
5 SYRUP ORAL 4 TIMES DAILY PRN
Qty: 236 ML | Refills: 0 | Status: SHIPPED | OUTPATIENT
Start: 2021-05-01 | End: 2021-05-01 | Stop reason: SDUPTHER

## 2021-05-01 RX ORDER — DOXYCYCLINE 100 MG/1
100 CAPSULE ORAL 2 TIMES DAILY
Qty: 20 CAPSULE | Refills: 0 | Status: SHIPPED | OUTPATIENT
Start: 2021-05-01 | End: 2021-05-01 | Stop reason: SDUPTHER

## 2021-05-01 RX ORDER — ALBUTEROL SULFATE 90 UG/1
2 AEROSOL, METERED RESPIRATORY (INHALATION)
Status: COMPLETED | OUTPATIENT
Start: 2021-05-01 | End: 2021-05-01

## 2021-05-01 RX ORDER — DOXYCYCLINE 100 MG/1
100 CAPSULE ORAL 2 TIMES DAILY
Qty: 20 CAPSULE | Refills: 0 | Status: SHIPPED | OUTPATIENT
Start: 2021-05-01 | End: 2021-05-11

## 2021-05-01 RX ORDER — GUAIFENESIN/DEXTROMETHORPHAN 100-10MG/5
5 SYRUP ORAL 4 TIMES DAILY PRN
Qty: 236 ML | Refills: 0 | Status: SHIPPED | OUTPATIENT
Start: 2021-05-01 | End: 2021-05-11

## 2021-05-01 RX ORDER — ALBUTEROL SULFATE 90 UG/1
1-2 AEROSOL, METERED RESPIRATORY (INHALATION) EVERY 6 HOURS PRN
Qty: 18 G | Refills: 0 | Status: SHIPPED | OUTPATIENT
Start: 2021-05-01

## 2021-05-01 RX ORDER — DOXYCYCLINE HYCLATE 100 MG
100 TABLET ORAL
Status: COMPLETED | OUTPATIENT
Start: 2021-05-01 | End: 2021-05-01

## 2021-05-01 RX ORDER — ALBUTEROL SULFATE 90 UG/1
1-2 AEROSOL, METERED RESPIRATORY (INHALATION) EVERY 6 HOURS PRN
Qty: 18 G | Refills: 0 | Status: SHIPPED | OUTPATIENT
Start: 2021-05-01 | End: 2021-05-01 | Stop reason: SDUPTHER

## 2021-05-01 RX ADMIN — SODIUM CHLORIDE 1000 ML: 0.9 INJECTION, SOLUTION INTRAVENOUS at 05:05

## 2021-05-01 RX ADMIN — ALBUTEROL SULFATE 2 PUFF: 90 AEROSOL, METERED RESPIRATORY (INHALATION) at 08:05

## 2021-05-01 RX ADMIN — DOXYCYCLINE HYCLATE 100 MG: 100 TABLET, COATED ORAL at 07:05

## 2021-05-06 LAB
BACTERIA BLD CULT: NORMAL
BACTERIA BLD CULT: NORMAL

## 2023-05-11 NOTE — Clinical Note
Catheter is repositioned to the aorta. Hemodynamics performed.  Consider and do recommend the 2 part shingles vaccine (Shingrix)-have this at local pharmacy   Please get your labs done when fasting-we will check to see if diabetes is present and if we can use one of the weight loss injection medications  Look to see if Medicare covers Wegovy (weight loss injection)  Continue to see specialist as directed  Continue medications as directed-call when refills needed   Call and schedule ENT-can see if Dr. Lozoya in [facility] is accepting new patients   Follow up in 6 months

## 2023-06-06 ENCOUNTER — HOSPITAL ENCOUNTER (EMERGENCY)
Facility: HOSPITAL | Age: 83
Discharge: HOME | End: 2023-06-06
Attending: STUDENT IN AN ORGANIZED HEALTH CARE EDUCATION/TRAINING PROGRAM
Payer: MEDICARE

## 2023-06-06 VITALS
HEIGHT: 63 IN | BODY MASS INDEX: 36.32 KG/M2 | DIASTOLIC BLOOD PRESSURE: 56 MMHG | WEIGHT: 205 LBS | HEART RATE: 56 BPM | OXYGEN SATURATION: 97 % | TEMPERATURE: 98 F | SYSTOLIC BLOOD PRESSURE: 118 MMHG | RESPIRATION RATE: 20 BRPM
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
## 2023-06-06 DIAGNOSIS — L03.90 CELLULITIS, UNSPECIFIED CELLULITIS SITE: ICD-10-CM

## 2023-06-06 DIAGNOSIS — R05.9 COUGH: ICD-10-CM

## 2023-06-06 LAB
ALBUMIN SERPL BCP-MCNC: 3.9 G/DL (ref 3.5–5.2)
ALP SERPL-CCNC: 76 U/L (ref 55–135)
ALT SERPL W/O P-5'-P-CCNC: 12 U/L (ref 10–44)
ANION GAP SERPL CALC-SCNC: 10 MMOL/L (ref 8–16)
AST SERPL-CCNC: 17 U/L (ref 10–40)
BASOPHILS # BLD AUTO: 0.02 K/UL (ref 0–0.2)
BASOPHILS NFR BLD: 0.3 % (ref 0–1.9)
BILIRUB SERPL-MCNC: 0.7 MG/DL (ref 0.1–1)
BUN SERPL-MCNC: 17 MG/DL (ref 8–23)
CALCIUM SERPL-MCNC: 11.3 MG/DL (ref 8.7–10.5)
CHLORIDE SERPL-SCNC: 100 MMOL/L (ref 95–110)
CO2 SERPL-SCNC: 27 MMOL/L (ref 23–29)
CREAT SERPL-MCNC: 1.6 MG/DL (ref 0.5–1.4)
CTP QC/QA: YES
CTP QC/QA: YES
DIFFERENTIAL METHOD: ABNORMAL
EOSINOPHIL # BLD AUTO: 0.1 K/UL (ref 0–0.5)
EOSINOPHIL NFR BLD: 2.2 % (ref 0–8)
ERYTHROCYTE [DISTWIDTH] IN BLOOD BY AUTOMATED COUNT: 13.6 % (ref 11.5–14.5)
EST. GFR  (NO RACE VARIABLE): 32 ML/MIN/1.73 M^2
GLUCOSE SERPL-MCNC: 117 MG/DL (ref 70–110)
HCT VFR BLD AUTO: 42.3 % (ref 37–48.5)
HGB BLD-MCNC: 13.5 G/DL (ref 12–16)
IMM GRANULOCYTES # BLD AUTO: 0.03 K/UL (ref 0–0.04)
IMM GRANULOCYTES NFR BLD AUTO: 0.5 % (ref 0–0.5)
LACTATE SERPL-SCNC: 1.4 MMOL/L (ref 0.5–2.2)
LYMPHOCYTES # BLD AUTO: 1.7 K/UL (ref 1–4.8)
LYMPHOCYTES NFR BLD: 26.3 % (ref 18–48)
MCH RBC QN AUTO: 31.8 PG (ref 27–31)
MCHC RBC AUTO-ENTMCNC: 31.9 G/DL (ref 32–36)
MCV RBC AUTO: 100 FL (ref 82–98)
MONOCYTES # BLD AUTO: 0.5 K/UL (ref 0.3–1)
MONOCYTES NFR BLD: 7.9 % (ref 4–15)
NEUTROPHILS # BLD AUTO: 4 K/UL (ref 1.8–7.7)
NEUTROPHILS NFR BLD: 62.8 % (ref 38–73)
NRBC BLD-RTO: 0 /100 WBC
PLATELET # BLD AUTO: 197 K/UL (ref 150–450)
PMV BLD AUTO: 10.8 FL (ref 9.2–12.9)
POC MOLECULAR INFLUENZA A AGN: NEGATIVE
POC MOLECULAR INFLUENZA B AGN: NEGATIVE
POTASSIUM SERPL-SCNC: 3.9 MMOL/L (ref 3.5–5.1)
PROT SERPL-MCNC: 7.6 G/DL (ref 6–8.4)
RBC # BLD AUTO: 4.24 M/UL (ref 4–5.4)
SARS-COV-2 RDRP RESP QL NAA+PROBE: NEGATIVE
SODIUM SERPL-SCNC: 137 MMOL/L (ref 136–145)
WBC # BLD AUTO: 6.35 K/UL (ref 3.9–12.7)

## 2023-06-06 PROCEDURE — 63600175 PHARM REV CODE 636 W HCPCS: Performed by: STUDENT IN AN ORGANIZED HEALTH CARE EDUCATION/TRAINING PROGRAM

## 2023-06-06 PROCEDURE — 96374 THER/PROPH/DIAG INJ IV PUSH: CPT

## 2023-06-06 PROCEDURE — 87502 INFLUENZA DNA AMP PROBE: CPT

## 2023-06-06 PROCEDURE — 96375 TX/PRO/DX INJ NEW DRUG ADDON: CPT

## 2023-06-06 PROCEDURE — 83605 ASSAY OF LACTIC ACID: CPT | Performed by: STUDENT IN AN ORGANIZED HEALTH CARE EDUCATION/TRAINING PROGRAM

## 2023-06-06 PROCEDURE — 85025 COMPLETE CBC W/AUTO DIFF WBC: CPT | Performed by: STUDENT IN AN ORGANIZED HEALTH CARE EDUCATION/TRAINING PROGRAM

## 2023-06-06 PROCEDURE — 25000003 PHARM REV CODE 250: Performed by: STUDENT IN AN ORGANIZED HEALTH CARE EDUCATION/TRAINING PROGRAM

## 2023-06-06 PROCEDURE — 80053 COMPREHEN METABOLIC PANEL: CPT | Performed by: STUDENT IN AN ORGANIZED HEALTH CARE EDUCATION/TRAINING PROGRAM

## 2023-06-06 PROCEDURE — 99284 EMERGENCY DEPT VISIT MOD MDM: CPT | Mod: 25

## 2023-06-06 RX ORDER — DIPHENHYDRAMINE HYDROCHLORIDE 50 MG/ML
12.5 INJECTION INTRAMUSCULAR; INTRAVENOUS
Status: COMPLETED | OUTPATIENT
Start: 2023-06-06 | End: 2023-06-06

## 2023-06-06 RX ORDER — SULFAMETHOXAZOLE AND TRIMETHOPRIM 800; 160 MG/1; MG/1
2 TABLET ORAL
Status: COMPLETED | OUTPATIENT
Start: 2023-06-06 | End: 2023-06-06

## 2023-06-06 RX ORDER — SULFAMETHOXAZOLE AND TRIMETHOPRIM 800; 160 MG/1; MG/1
1 TABLET ORAL 2 TIMES DAILY
Qty: 14 TABLET | Refills: 0 | Status: SHIPPED | OUTPATIENT
Start: 2023-06-06 | End: 2023-06-13

## 2023-06-06 RX ORDER — DEXAMETHASONE SODIUM PHOSPHATE 4 MG/ML
10 INJECTION, SOLUTION INTRA-ARTICULAR; INTRALESIONAL; INTRAMUSCULAR; INTRAVENOUS; SOFT TISSUE
Status: COMPLETED | OUTPATIENT
Start: 2023-06-06 | End: 2023-06-06

## 2023-06-06 RX ADMIN — DEXAMETHASONE SODIUM PHOSPHATE 10 MG: 4 INJECTION INTRA-ARTICULAR; INTRALESIONAL; INTRAMUSCULAR; INTRAVENOUS; SOFT TISSUE at 05:06

## 2023-06-06 RX ADMIN — DIPHENHYDRAMINE HYDROCHLORIDE 12.5 MG: 50 INJECTION, SOLUTION INTRAMUSCULAR; INTRAVENOUS at 05:06

## 2023-06-06 RX ADMIN — SULFAMETHOXAZOLE AND TRIMETHOPRIM 2 TABLET: 800; 160 TABLET ORAL at 06:06

## 2023-06-06 NOTE — ED PROVIDER NOTES
Encounter Date: 6/6/2023       History     Chief Complaint   Patient presents with    Insect Bite     Pt with multiple insect bites to right upper chest, shoulder and back x 6 days. Reports temp 101 yesterday. Afebrile today. No medications for fever today      [age] old female who presents with evaluation for an insect bite.  She says that a small black insect bit her multiple times about 5 or 6 days ago.  It is to the upper back and neck.  On the right side.  It itches but is not painful.  Some of the wounds are weeping.  No nausea, vomiting, diarrhea.  She has had a cough but addition to upper respiratory tract infection symptoms.  No prior history of immunologic compromise or chronic immunotherapy or chronic steroids at home.    Review of patient's allergies indicates:   Allergen Reactions    Keflex [cephalexin] Itching    Moxifloxacin Itching    Penicillins Hives    Codeine Itching and Nausea Only     Past Medical History:   Diagnosis Date    Arthritis     Chronic kidney disease     stage 3     Coronary artery disease     Hypertension     Obesity (BMI 30-39.9)     Thyroid disease      Past Surgical History:   Procedure Laterality Date    ABDOMINAL SURGERY      removal scar tissue abdomen    BACK SURGERY      x3    CHOLECYSTECTOMY      CORONARY ANGIOPLASTY  07/25/2019    prox LAD 2.75x26 Resolute [place] ROBERTO    HYSTERECTOMY      JOINT REPLACEMENT      Rt total knee    LEFT HEART CATHETERIZATION Left 7/25/2019    Procedure: Left heart cath R rad access;  Surgeon: Carlos Alberto Yadav MD;  Location: [facility] CATH LAB;  Service: Cardiology;  Laterality: Left;    LEFT HEART CATHETERIZATION Left 10/15/2019    Procedure: Left heart cath 10am start, R rad access;  Surgeon: Carlos Alberto Yadav MD;  Location: [facility] CATH LAB;  Service: Cardiology;  Laterality: Left;  RN PREOP 10/11/2019    LEFT HEART CATHETERIZATION Left 9/23/2020    Procedure: Left heart cath 12pm start, R rad access;  Surgeon: Carlos Alberto Yadav MD;   Location: [facility] CATH LAB;  Service: Cardiology;  Laterality: Left;  RN PREOP 2020-----COVID NEGATIVE ON     REVISION OF SCAR TISSUE RECTUS MUSCLE      after gall bladder surgery     No family history on file.  Social History     Tobacco Use    Smoking status: Former     Types: Cigarettes     Quit date:      Years since quittin.4    Smokeless tobacco: Never   Substance Use Topics    Alcohol use: No    Drug use: No     Review of Systems   All other systems reviewed and are negative.    Physical Exam     Initial Vitals [23 1638]   BP Pulse Resp Temp SpO2   (!) 138/97 63 18 98.1 °F (36.7 °C) 98 %      MAP       --         Physical Exam    Nursing note and vitals reviewed.  Constitutional: She appears well-developed and well-nourished.   HENT:   Head: Normocephalic and atraumatic.   Eyes: EOM are normal. Pupils are equal, round, and reactive to light.   Neck: Neck supple. No crepitus.   Normal range of motion.  Cardiovascular:  Normal rate, regular rhythm, normal heart sounds and intact distal pulses.     Exam reveals no S3.       No murmur heard.  Pulmonary/Chest: Breath sounds normal. No respiratory distress.   Abdominal: Abdomen is soft. Bowel sounds are normal. She exhibits no pulsatile midline mass.   Musculoskeletal:         General: No tenderness or edema. Normal range of motion.      Cervical back: Normal, normal range of motion and neck supple. No deformity, tenderness, bony tenderness or crepitus.      Thoracic back: Normal. No deformity, tenderness or bony tenderness.      Lumbar back: Normal. No deformity, tenderness or bony tenderness. Negative right straight leg raise test and negative left straight leg raise test.     Neurological: She is alert and oriented to person, place, and time. She has normal strength and normal reflexes. She displays normal reflexes. GCS score is 15. GCS eye subscore is 4. GCS verbal subscore is 5. GCS motor subscore is 6.   Skin: Skin is warm and dry.  Capillary refill takes less than 2 seconds.   Raised erythematous area to the upper right back extending to the right trapezius.  There are white blood she areas throughout the upper back which she says she did not know about.  This area does not bother her it is not itchy.   Psychiatric: She has a normal mood and affect. Thought content normal.       ED Course   Procedures  Labs Reviewed   CBC W/ AUTO DIFFERENTIAL - Abnormal; Notable for the following components:       Result Value     (*)     MCH 31.8 (*)     MCHC 31.9 (*)     All other components within normal limits   COMPREHENSIVE METABOLIC PANEL - Abnormal; Notable for the following components:    Glucose 117 (*)     Creatinine 1.6 (*)     Calcium 11.3 (*)     eGFR 32 (*)     All other components within normal limits   LACTIC ACID, PLASMA   SARS-COV-2 RDRP GENE   POCT INFLUENZA A/B MOLECULAR          Imaging Results              X-Ray Chest AP Portable (Final result)  Result time 06/06/23 17:54:22      Final result by Una Mariano MD (06/06/23 17:54:22)                   Impression:      No acute abnormality.      Electronically signed by: Una Mariano  Date:    06/06/2023  Time:    17:54               Narrative:    EXAMINATION:  XR CHEST AP PORTABLE    CLINICAL HISTORY:  Cough, unspecified    TECHNIQUE:  Single frontal view of the chest was performed.    COMPARISON:  05/01/2021 chest x-ray    FINDINGS:  The lungs are clear, with normal appearance of pulmonary vasculature and no pleural effusion or pneumothorax.    The cardiac silhouette is normal in size. The hilar and mediastinal contours are unremarkable.    Bones are intact.                                       Medications   dexAMETHasone injection 10 mg (10 mg Intravenous Given 6/6/23 1751)   diphenhydrAMINE injection 12.5 mg (12.5 mg Intravenous Given 6/6/23 1750)   sulfamethoxazole-trimethoprim 800-160mg per tablet 2 tablet (2 tablets Oral Given 6/6/23 1804)     Medical Decision  Making:   Initial Assessment:   Hemodynamically stable. Afebrile. Phonating and protecting the airway spontaneously. No clinical evidence for cardiovascular instability or impending airway compromise. Examination as above.  Concern for superimposed cellulitis after insect bite.  Per nursing, the patient had a fever yesterday.  Will obtain laboratory studies in an abundance of caution, chest x-ray to her cough provide antibiotics and had adjunctive therapies for allergic reaction.  Will hold on epinephrine.           ED Course as of 06/06/23 1822 Tue Jun 06, 2023   1653 Images added to her chart. [BG]   1820 Labs reviewed. CBC negative. CMP negative. Swabs negative. Will tx with bactrim due to superimposed infection. Will give dermatology followup.    The patient was reassessed and on subsequent re-evaluation, they were subjectively feeling better. They were resting comfortably and in no acute distress. I discussed the laboratory and diagnostic findings with the patient. Education was provided and all questions were answered. As discussed, they were recommended to follow up with their primary care physician within the next few days and to return to the emergency department sooner for any new or worsening. They acknowledged and verbalized agreement to the treatment plan. The patient was discharged home in stable condition.     DISCLAIMER: This note was prepared with [employer] voice recognition transcription software. Garbled syntax, mangled pronouns, and other bizarre constructions may be attributed to that software system.     [BG]      ED Course User Index  [BG] Adan Harrison MD                 Clinical Impression:   Final diagnoses:  [R05.9] Cough  [W57.XXXA] Insect bite, unspecified site, initial encounter (Primary)  [L03.90] Cellulitis, unspecified cellulitis site        ED Disposition Condition    Discharge Stable          ED Prescriptions       Medication Sig Dispense Start Date End Date Auth. Provider     sulfamethoxazole-trimethoprim 800-160mg (BACTRIM DS) 800-160 mg Tab Take 1 tablet by mouth 2 (two) times daily. for 7 days 14 tablet 6/6/2023 6/13/2023 Adan Harrison MD          Follow-up Information       Follow up With Specialties Details Why Contact Info    Marii Bauer MD Internal Medicine Go to  As needed [address]  [facility] [id number] [phone]               Adan Harrison MD  06/06/23 [id number]

## 2023-06-06 NOTE — ED TRIAGE NOTES
Pt arrived to the ED due to increased redness and itchiness to insect bites that occurred 6 days ago. Denies any pain

## 2023-06-06 NOTE — DISCHARGE INSTRUCTIONS

## 2023-06-07 ENCOUNTER — TELEPHONE (OUTPATIENT)
Dept: ADMINISTRATIVE | Facility: HOSPITAL | Age: 83
End: 2023-06-07
Payer: MEDICARE

## 2023-06-16 ENCOUNTER — TELEPHONE (OUTPATIENT)
Dept: ADMINISTRATIVE | Facility: HOSPITAL | Age: 83
End: 2023-06-16
Payer: MEDICARE

## 2023-07-07 ENCOUNTER — TELEPHONE (OUTPATIENT)
Dept: ADMINISTRATIVE | Facility: HOSPITAL | Age: 83
End: 2023-07-07
Payer: MEDICARE

## 2024-04-24 NOTE — CARE UPDATE
[facility]  ICU Multidisciplinary Bedside Rounds   SUMMARY     Date: 7/26/2019    Prehospitalization: Home  Admit Date / LOS : 7/24/2019/ 2 days    Diagnosis: NSTEMI (non-ST elevated myocardial infarction)    Consults:        Active: Cardio       Needed: none     Code Status: Full Code  Advanced Directive: <no information>    LDA: none       Central Lines/Site/Justification:Patient Does Not Have Central Line       Urinary Cath/Order/Justification:Patient Does Not Have Urinary Catheter    Infusions: Saline Locked      CAM ICU: Negative  Pain Management: none       Pain Controlled: not applicable     Rhythm: NSR    Respiratory Device: Room Air        VTE Prophylaxis: mechanical  Mobility: Ambulatory  Stress Ulcer Prophylaxis: No    Dietary: PO  Tolerance: not applicable  /  Advancement: no    Isolation: No active isolations    Restraints: No    Significant Dates:  Post Op Date: (07/25/19) Left heart cath R rad access-- PCI with stent placement to LAD  Rescue Date: N/A  Imaging/ Diagnostics: N/A    Noteworthy Labs:  none    Needs from Care Team: Proper discharge teaching. Plan is to discharge home. Start Plavix 75mg daily for 1yr. Follow up with  in office in 2weeks.     ICU LOS 17h  Level of Care: Discharge   within normal limits

## 2024-05-20 ENCOUNTER — HOSPITAL ENCOUNTER (EMERGENCY)
Facility: HOSPITAL | Age: 84
Discharge: HOME | End: 2024-05-20
Attending: EMERGENCY MEDICINE
Payer: MEDICARE

## 2024-05-20 VITALS
SYSTOLIC BLOOD PRESSURE: 150 MMHG | WEIGHT: 198 LBS | TEMPERATURE: 98 F | HEART RATE: 63 BPM | HEIGHT: 63 IN | OXYGEN SATURATION: 97 % | RESPIRATION RATE: 24 BRPM | DIASTOLIC BLOOD PRESSURE: 67 MMHG | BODY MASS INDEX: 35.08 KG/M2

## 2024-05-20 DIAGNOSIS — R07.9 CHEST PAIN: ICD-10-CM

## 2024-05-20 DIAGNOSIS — B02.9 HERPES ZOSTER WITHOUT COMPLICATION: Primary | ICD-10-CM

## 2024-05-20 LAB
ALBUMIN SERPL BCP-MCNC: 3.6 G/DL (ref 3.5–5.2)
ALP SERPL-CCNC: 78 U/L (ref 55–135)
ALT SERPL W/O P-5'-P-CCNC: 13 U/L (ref 10–44)
ANION GAP SERPL CALC-SCNC: 9 MMOL/L (ref 8–16)
AST SERPL-CCNC: 18 U/L (ref 10–40)
BASOPHILS # BLD AUTO: 0.03 K/UL (ref 0–0.2)
BASOPHILS NFR BLD: 0.5 % (ref 0–1.9)
BILIRUB SERPL-MCNC: 0.4 MG/DL (ref 0.1–1)
BNP SERPL-MCNC: 90 PG/ML (ref 0–99)
BUN SERPL-MCNC: 30 MG/DL (ref 8–23)
CALCIUM SERPL-MCNC: 9.9 MG/DL (ref 8.7–10.5)
CHLORIDE SERPL-SCNC: 104 MMOL/L (ref 95–110)
CO2 SERPL-SCNC: 25 MMOL/L (ref 23–29)
CREAT SERPL-MCNC: 1.8 MG/DL (ref 0.5–1.4)
DIFFERENTIAL METHOD BLD: ABNORMAL
EOSINOPHIL # BLD AUTO: 0.1 K/UL (ref 0–0.5)
EOSINOPHIL NFR BLD: 1.7 % (ref 0–8)
ERYTHROCYTE [DISTWIDTH] IN BLOOD BY AUTOMATED COUNT: 13.9 % (ref 11.5–14.5)
EST. GFR  (NO RACE VARIABLE): 27 ML/MIN/1.73 M^2
GLUCOSE SERPL-MCNC: 113 MG/DL (ref 70–110)
HCT VFR BLD AUTO: 36.8 % (ref 37–48.5)
HGB BLD-MCNC: 11.8 G/DL (ref 12–16)
IMM GRANULOCYTES # BLD AUTO: 0.04 K/UL (ref 0–0.04)
IMM GRANULOCYTES NFR BLD AUTO: 0.6 % (ref 0–0.5)
LYMPHOCYTES # BLD AUTO: 2.4 K/UL (ref 1–4.8)
LYMPHOCYTES NFR BLD: 37.5 % (ref 18–48)
MCH RBC QN AUTO: 31.5 PG (ref 27–31)
MCHC RBC AUTO-ENTMCNC: 32.1 G/DL (ref 32–36)
MCV RBC AUTO: 98 FL (ref 82–98)
MONOCYTES # BLD AUTO: 0.8 K/UL (ref 0.3–1)
MONOCYTES NFR BLD: 12.6 % (ref 4–15)
NEUTROPHILS # BLD AUTO: 3 K/UL (ref 1.8–7.7)
NEUTROPHILS NFR BLD: 47.1 % (ref 38–73)
NRBC BLD-RTO: 0 /100 WBC
PLATELET # BLD AUTO: 131 K/UL (ref 150–450)
PMV BLD AUTO: 11.7 FL (ref 9.2–12.9)
POTASSIUM SERPL-SCNC: 4.2 MMOL/L (ref 3.5–5.1)
PROT SERPL-MCNC: 7 G/DL (ref 6–8.4)
RBC # BLD AUTO: 3.75 M/UL (ref 4–5.4)
SODIUM SERPL-SCNC: 138 MMOL/L (ref 136–145)
TROPONIN I SERPL DL<=0.01 NG/ML-MCNC: <0.006 NG/ML (ref 0–0.03)
WBC # BLD AUTO: 6.35 K/UL (ref 3.9–12.7)

## 2024-05-20 PROCEDURE — 63600175 PHARM REV CODE 636 W HCPCS: Performed by: EMERGENCY MEDICINE

## 2024-05-20 PROCEDURE — 84484 ASSAY OF TROPONIN QUANT: CPT

## 2024-05-20 PROCEDURE — 25000003 PHARM REV CODE 250: Performed by: EMERGENCY MEDICINE

## 2024-05-20 PROCEDURE — 99285 EMERGENCY DEPT VISIT HI MDM: CPT | Mod: 25

## 2024-05-20 PROCEDURE — 80053 COMPREHEN METABOLIC PANEL: CPT

## 2024-05-20 PROCEDURE — 85025 COMPLETE CBC W/AUTO DIFF WBC: CPT

## 2024-05-20 PROCEDURE — 93010 ELECTROCARDIOGRAM REPORT: CPT | Mod: ,,, | Performed by: INTERNAL MEDICINE

## 2024-05-20 PROCEDURE — 93005 ELECTROCARDIOGRAM TRACING: CPT

## 2024-05-20 PROCEDURE — 83880 ASSAY OF NATRIURETIC PEPTIDE: CPT

## 2024-05-20 RX ORDER — VALACYCLOVIR HYDROCHLORIDE 1 G/1
1000 TABLET, FILM COATED ORAL 3 TIMES DAILY
Qty: 21 TABLET | Refills: 0 | Status: SHIPPED | OUTPATIENT
Start: 2024-05-20 | End: 2024-05-27

## 2024-05-20 RX ORDER — GABAPENTIN 300 MG/1
300 CAPSULE ORAL 3 TIMES DAILY
Qty: 45 CAPSULE | Refills: 0 | Status: SHIPPED | OUTPATIENT
Start: 2024-05-20 | End: 2025-05-20

## 2024-05-20 RX ORDER — PREDNISONE 10 MG/1
TABLET ORAL
Qty: 21 TABLET | Refills: 0 | Status: SHIPPED | OUTPATIENT
Start: 2024-05-21

## 2024-05-20 RX ORDER — VALACYCLOVIR HYDROCHLORIDE 500 MG/1
1000 TABLET, FILM COATED ORAL
Status: COMPLETED | OUTPATIENT
Start: 2024-05-20 | End: 2024-05-20

## 2024-05-20 RX ORDER — PREDNISONE 20 MG/1
60 TABLET ORAL
Status: COMPLETED | OUTPATIENT
Start: 2024-05-20 | End: 2024-05-20

## 2024-05-20 RX ADMIN — VALACYCLOVIR HYDROCHLORIDE 1000 MG: 500 TABLET, FILM COATED ORAL at 11:05

## 2024-05-20 RX ADMIN — PREDNISONE 60 MG: 20 TABLET ORAL at 11:05

## 2024-05-21 LAB
OHS QRS DURATION: 88 MS
OHS QTC CALCULATION: 424 MS

## 2024-05-21 NOTE — ED PROVIDER NOTES
Encounter Date: 5/20/2024       History     Chief Complaint   Patient presents with    Chest Pain     Pt presents to the ED for left anterior chest pain that began last night. Pt report pain radiation to right shoulder and jaw. Pt reports sob but denies dizziness, nausea or diaphoresis.     Insect Bite     Pt report insect bite left upper jaw Friday. Unknown vector per patient. Pt endorses redness to area.      Have year old female who does have chronic kidney disease stage 3, coronary disease, hypertension presents complaining of left neck left shoulder and left chest pain for 3-4 days.  Worsening last night.  Noticed some insect bites in his area Friday night.  Specifically to me there was no right shoulder pain as stated in triage.  Also no shortness a breath or dizziness associated with these presenting complaints.      Review of patient's allergies indicates:   Allergen Reactions    Keflex [cephalexin] Itching    Moxifloxacin Itching    Penicillins Hives    Codeine Itching and Nausea Only     Past Medical History:   Diagnosis Date    Arthritis     Chronic kidney disease     stage 3     Coronary artery disease     Hypertension     Obesity (BMI 30-39.9)     Thyroid disease      Past Surgical History:   Procedure Laterality Date    ABDOMINAL SURGERY      removal scar tissue abdomen    BACK SURGERY      x3    CHOLECYSTECTOMY      CORONARY ANGIOPLASTY  07/25/2019    prox LAD 2.75x26 Resolute [place] ROBERTO    HYSTERECTOMY      JOINT REPLACEMENT      Rt total knee    LEFT HEART CATHETERIZATION Left 7/25/2019    Procedure: Left heart cath R rad access;  Surgeon: Carlos Alberto Yadav MD;  Location: [facility] CATH LAB;  Service: Cardiology;  Laterality: Left;    LEFT HEART CATHETERIZATION Left 10/15/2019    Procedure: Left heart cath 10am start, R rad access;  Surgeon: Carlos Alberto Yadav MD;  Location: [facility] CATH LAB;  Service: Cardiology;  Laterality: Left;  RN PREOP 10/11/2019    LEFT HEART CATHETERIZATION Left 9/23/2020     Procedure: Left heart cath 12pm start, R rad access;  Surgeon: Carlos Alberto Yadav MD;  Location: [facility] CATH LAB;  Service: Cardiology;  Laterality: Left;  RN PREOP 2020-----COVID NEGATIVE ON     REVISION OF SCAR TISSUE RECTUS MUSCLE      after gall bladder surgery     No family history on file.  Social History     Tobacco Use    Smoking status: Former     Current packs/day: 0.00     Types: Cigarettes     Quit date:      Years since quittin.4    Smokeless tobacco: Never   Substance Use Topics    Alcohol use: No    Drug use: No     Review of Systems    Physical Exam     Initial Vitals [24]   BP Pulse Resp Temp SpO2   136/65 71 18 98 °F (36.7 °C) 96 %      MAP       --         Physical Exam    Nursing note and vitals reviewed.  Constitutional: She appears well-developed and well-nourished.   Eyes: EOM are normal. Pupils are equal, round, and reactive to light.   Neck: Neck supple. No thyromegaly present. No JVD present.   Normal range of motion.  Cardiovascular:  Normal rate, regular rhythm, normal heart sounds and intact distal pulses.     Exam reveals no gallop and no friction rub.       No murmur heard.  Pulmonary/Chest: Breath sounds normal. No respiratory distress.   Abdominal: Abdomen is soft. Bowel sounds are normal. There is no abdominal tenderness.   Musculoskeletal:         General: No tenderness or edema. Normal range of motion.      Cervical back: Normal range of motion and neck supple.     Neurological: She is alert and oriented to person, place, and time. She has normal strength.   Skin: Skin is warm and dry.   Patient has various areas of clusters of vesicles on erythematous base in a dermatomal pattern consistent with shingles that comes up the base of the neck on the left around the shoulder and left upper chest.         ED Course   Procedures  Labs Reviewed   CBC W/ AUTO DIFFERENTIAL - Abnormal; Notable for the following components:       Result Value    RBC 3.75 (*)      Hemoglobin 11.8 (*)     Hematocrit 36.8 (*)     MCH 31.5 (*)     Platelets 131 (*)     Immature Granulocytes 0.6 (*)     All other components within normal limits   COMPREHENSIVE METABOLIC PANEL - Abnormal; Notable for the following components:    Glucose 113 (*)     BUN 30 (*)     Creatinine 1.8 (*)     eGFR 27 (*)     All other components within normal limits   TROPONIN I   B-TYPE NATRIURETIC PEPTIDE     EKG Readings: (Independently Interpreted)   Initial Reading: No STEMI. Conduction: Normal. ST Segments: Normal ST Segments.   Normal sinus rhythm with sinus arrhythmia rate of 65       Imaging Results              X-Ray Chest AP Portable (Final result)  Result time 05/20/24 22:56:27      Final result by Bhavani Bhatt MD (05/20/24 22:56:27)                   Impression:      No acute cardiopulmonary process identified.      Electronically signed by: Bhavani Bhatt MD  Date:    05/20/2024  Time:    22:56               Narrative:    EXAMINATION:  XR CHEST AP PORTABLE    CLINICAL HISTORY:  Chest pain, unspecified    TECHNIQUE:  Single frontal view of the chest was performed.    COMPARISON:  June 2023.    FINDINGS:  Cardiac silhouette is normal in size.  Lungs are symmetrically expanded.  No evidence of focal consolidative process, pneumothorax, or significant pleural effusion.  No acute osseous abnormality identified.                                       Medications   valACYclovir tablet 1,000 mg (1,000 mg Oral Given 5/20/24 2305)   predniSONE tablet 60 mg (60 mg Oral Given 5/20/24 2303)     Medical Decision Making  Risk  Prescription drug management.    Patient's symptoms is not consistent with cardiac chest pain.  EKG is reassuring.  Troponin is negative.  Symptoms been ongoing since Friday.  This is shingles.  Will treat with Valtrex, steroids, gabapentin.                                  Clinical Impression:  Final diagnoses:  [R07.9] Chest pain  [B02.9] Herpes zoster without complication (Primary)           ED Disposition Condition    Discharge Stable          ED Prescriptions       Medication Sig Dispense Start Date End Date Auth. Provider    valACYclovir (VALTREX) 1000 MG tablet Take 1 tablet (1,000 mg total) by mouth 3 (three) times daily. for 7 days 21 tablet 5/20/2024 5/27/2024 Edmundo Wing MD    predniSONE (DELTASONE) 10 MG tablet Take 4 tabs x 3 days, then take 2 tabs x 3 days, then take 1 tab x 3 days. 21 tablet 5/21/2024 -- Edmundo Wing MD    gabapentin (NEURONTIN) 300 MG capsule Take 1 capsule (300 mg total) by mouth 3 (three) times daily. 45 capsule 5/20/2024 5/20/2025 Edmundo Wing MD          Follow-up Information       Follow up With Specialties Details Why Contact Info    Marii Trejo MD Internal Medicine Schedule an appointment as soon as possible for a visit   [address]  [facility] [address]  [phone]      [facility] - Emergency Dept Emergency Medicine  As needed [address] [facility] [place] [address] [phone]             Edmundo Wing MD  05/21/24 3788

## 2024-05-21 NOTE — ED TRIAGE NOTES
Pt presents to ED d/t L shoulder/ chest pain since Saturday. Pt reports that she thinks she might've been bit by an insect on Friday several times because she sees several red patches to her skin that hurts and feels like a burning sensation. Redness just beneath L ear, L shoulder, L scapula, and L armpit and underneath mid neck.Pt reports hx of stents. Pt is AAOx4, placed on cardiac monitoring and continuous pulse ox, 97% on RA.

## 2024-10-21 NOTE — Clinical Note
Calling to confirm appointment on 10/22, patient didn't answer lvm     The wire is removed from the  aorta.

## 2024-11-19 ENCOUNTER — HOSPITAL ENCOUNTER (INPATIENT)
Facility: HOSPITAL | Age: 84
LOS: 1 days | Discharge: HOME | DRG: 287 | End: 2024-11-22
Attending: EMERGENCY MEDICINE | Admitting: STUDENT IN AN ORGANIZED HEALTH CARE EDUCATION/TRAINING PROGRAM
Payer: MEDICARE

## 2024-11-19 DIAGNOSIS — R94.39 ABNORMAL NUCLEAR STRESS TEST: ICD-10-CM

## 2024-11-19 DIAGNOSIS — R07.9 CHEST PAIN: ICD-10-CM

## 2024-11-19 DIAGNOSIS — I21.4 NSTEMI (NON-ST ELEVATION MYOCARDIAL INFARCTION): ICD-10-CM

## 2024-11-19 DIAGNOSIS — R07.2 PRECORDIAL PAIN: ICD-10-CM

## 2024-11-19 DIAGNOSIS — I25.110 CORONARY ARTERY DISEASE INVOLVING NATIVE CORONARY ARTERY OF NATIVE HEART WITH UNSTABLE ANGINA PECTORIS: Primary | ICD-10-CM

## 2024-11-19 DIAGNOSIS — I21.4 NON-ST ELEVATION MYOCARDIAL INFARCTION (NSTEMI): ICD-10-CM

## 2024-11-19 DIAGNOSIS — R94.31 ECG ABNORMAL: ICD-10-CM

## 2024-11-19 DIAGNOSIS — I25.119 CHEST PAIN DUE TO CAD: ICD-10-CM

## 2024-11-19 LAB
ABO + RH BLD: NORMAL
ALBUMIN SERPL BCP-MCNC: 3.5 G/DL (ref 3.5–5.2)
ALP SERPL-CCNC: 85 U/L (ref 40–150)
ALT SERPL W/O P-5'-P-CCNC: 13 U/L (ref 10–44)
ANION GAP SERPL CALC-SCNC: 9 MMOL/L (ref 8–16)
APTT PPP: <21 SEC (ref 21–32)
AST SERPL-CCNC: 18 U/L (ref 10–40)
BASOPHILS # BLD AUTO: 0.02 K/UL (ref 0–0.2)
BASOPHILS NFR BLD: 0.3 % (ref 0–1.9)
BILIRUB SERPL-MCNC: 0.5 MG/DL (ref 0.1–1)
BLD GP AB SCN CELLS X3 SERPL QL: NORMAL
BNP SERPL-MCNC: 175 PG/ML (ref 0–99)
BUN SERPL-MCNC: 18 MG/DL (ref 8–23)
CALCIUM SERPL-MCNC: 9.6 MG/DL (ref 8.7–10.5)
CHLORIDE SERPL-SCNC: 108 MMOL/L (ref 95–110)
CHOLEST SERPL-MCNC: 126 MG/DL (ref 120–199)
CHOLEST/HDLC SERPL: 2.2 {RATIO} (ref 2–5)
CO2 SERPL-SCNC: 22 MMOL/L (ref 23–29)
CREAT SERPL-MCNC: 1.3 MG/DL (ref 0.5–1.4)
DIFFERENTIAL METHOD BLD: ABNORMAL
EOSINOPHIL # BLD AUTO: 0.1 K/UL (ref 0–0.5)
EOSINOPHIL NFR BLD: 1.3 % (ref 0–8)
ERYTHROCYTE [DISTWIDTH] IN BLOOD BY AUTOMATED COUNT: 13.9 % (ref 11.5–14.5)
EST. GFR  (NO RACE VARIABLE): 40 ML/MIN/1.73 M^2
GLUCOSE SERPL-MCNC: 87 MG/DL (ref 70–110)
HCT VFR BLD AUTO: 35.8 % (ref 37–48.5)
HDLC SERPL-MCNC: 58 MG/DL (ref 40–75)
HDLC SERPL: 46 % (ref 20–50)
HGB BLD-MCNC: 11.7 G/DL (ref 12–16)
IMM GRANULOCYTES # BLD AUTO: 0.07 K/UL (ref 0–0.04)
IMM GRANULOCYTES NFR BLD AUTO: 0.9 % (ref 0–0.5)
INR PPP: 1 (ref 0.8–1.2)
LDLC SERPL CALC-MCNC: 57.4 MG/DL (ref 63–159)
LYMPHOCYTES # BLD AUTO: 2.1 K/UL (ref 1–4.8)
LYMPHOCYTES NFR BLD: 27.4 % (ref 18–48)
MCH RBC QN AUTO: 32.1 PG (ref 27–31)
MCHC RBC AUTO-ENTMCNC: 32.7 G/DL (ref 32–36)
MCV RBC AUTO: 98 FL (ref 82–98)
MONOCYTES # BLD AUTO: 0.7 K/UL (ref 0.3–1)
MONOCYTES NFR BLD: 9 % (ref 4–15)
NEUTROPHILS # BLD AUTO: 4.8 K/UL (ref 1.8–7.7)
NEUTROPHILS NFR BLD: 61.1 % (ref 38–73)
NONHDLC SERPL-MCNC: 68 MG/DL
NRBC BLD-RTO: 0 /100 WBC
PLATELET # BLD AUTO: 162 K/UL (ref 150–450)
PMV BLD AUTO: 11.3 FL (ref 9.2–12.9)
POTASSIUM SERPL-SCNC: 3.6 MMOL/L (ref 3.5–5.1)
PROT SERPL-MCNC: 6.7 G/DL (ref 6–8.4)
PROTHROMBIN TIME: 10.7 SEC (ref 9–12.5)
RBC # BLD AUTO: 3.64 M/UL (ref 4–5.4)
SODIUM SERPL-SCNC: 139 MMOL/L (ref 136–145)
SPECIMEN OUTDATE: NORMAL
TRIGL SERPL-MCNC: 53 MG/DL (ref 30–150)
TROPONIN I SERPL DL<=0.01 NG/ML-MCNC: 0.01 NG/ML (ref 0–0.03)
TROPONIN I SERPL DL<=0.01 NG/ML-MCNC: 0.01 NG/ML (ref 0–0.03)
TROPONIN I SERPL DL<=0.01 NG/ML-MCNC: <0.006 NG/ML (ref 0–0.03)
WBC # BLD AUTO: 7.77 K/UL (ref 3.9–12.7)

## 2024-11-19 PROCEDURE — 93010 ELECTROCARDIOGRAM REPORT: CPT | Mod: ,,, | Performed by: INTERNAL MEDICINE

## 2024-11-19 PROCEDURE — 83880 ASSAY OF NATRIURETIC PEPTIDE: CPT | Performed by: EMERGENCY MEDICINE

## 2024-11-19 PROCEDURE — G0378 HOSPITAL OBSERVATION PER HR: HCPCS

## 2024-11-19 PROCEDURE — 83036 HEMOGLOBIN GLYCOSYLATED A1C: CPT

## 2024-11-19 PROCEDURE — 85025 COMPLETE CBC W/AUTO DIFF WBC: CPT | Performed by: EMERGENCY MEDICINE

## 2024-11-19 PROCEDURE — 99285 EMERGENCY DEPT VISIT HI MDM: CPT | Mod: 25

## 2024-11-19 PROCEDURE — 96374 THER/PROPH/DIAG INJ IV PUSH: CPT

## 2024-11-19 PROCEDURE — 86850 RBC ANTIBODY SCREEN: CPT

## 2024-11-19 PROCEDURE — 25000003 PHARM REV CODE 250: Performed by: EMERGENCY MEDICINE

## 2024-11-19 PROCEDURE — 93005 ELECTROCARDIOGRAM TRACING: CPT

## 2024-11-19 PROCEDURE — 85610 PROTHROMBIN TIME: CPT

## 2024-11-19 PROCEDURE — 80061 LIPID PANEL: CPT

## 2024-11-19 PROCEDURE — 80053 COMPREHEN METABOLIC PANEL: CPT | Performed by: EMERGENCY MEDICINE

## 2024-11-19 PROCEDURE — 84484 ASSAY OF TROPONIN QUANT: CPT | Mod: 91 | Performed by: EMERGENCY MEDICINE

## 2024-11-19 PROCEDURE — 85730 THROMBOPLASTIN TIME PARTIAL: CPT

## 2024-11-19 PROCEDURE — 63600175 PHARM REV CODE 636 W HCPCS

## 2024-11-19 PROCEDURE — 96372 THER/PROPH/DIAG INJ SC/IM: CPT

## 2024-11-19 PROCEDURE — 84484 ASSAY OF TROPONIN QUANT: CPT | Mod: 91

## 2024-11-19 RX ORDER — HEPARIN SODIUM 5000 [USP'U]/ML
7500 INJECTION, SOLUTION INTRAVENOUS; SUBCUTANEOUS EVERY 8 HOURS
Status: DISCONTINUED | OUTPATIENT
Start: 2024-11-19 | End: 2024-11-20

## 2024-11-19 RX ORDER — MECLIZINE HYDROCHLORIDE 25 MG/1
25 TABLET ORAL 3 TIMES DAILY PRN
Status: DISCONTINUED | OUTPATIENT
Start: 2024-11-19 | End: 2024-11-22 | Stop reason: HOSPADM

## 2024-11-19 RX ORDER — HYDROCODONE BITARTRATE AND ACETAMINOPHEN 5; 325 MG/1; MG/1
1 TABLET ORAL EVERY 6 HOURS PRN
Status: DISCONTINUED | OUTPATIENT
Start: 2024-11-19 | End: 2024-11-20

## 2024-11-19 RX ORDER — ATORVASTATIN CALCIUM 40 MG/1
80 TABLET, FILM COATED ORAL DAILY
Status: DISCONTINUED | OUTPATIENT
Start: 2024-11-20 | End: 2024-11-22 | Stop reason: HOSPADM

## 2024-11-19 RX ORDER — ALBUTEROL SULFATE 2.5 MG/.5ML
5 SOLUTION RESPIRATORY (INHALATION) EVERY 6 HOURS PRN
Status: DISCONTINUED | OUTPATIENT
Start: 2024-11-19 | End: 2024-11-22 | Stop reason: HOSPADM

## 2024-11-19 RX ORDER — METOPROLOL TARTRATE 25 MG/1
12.5 TABLET ORAL 2 TIMES DAILY
Status: DISCONTINUED | OUTPATIENT
Start: 2024-11-19 | End: 2024-11-22 | Stop reason: HOSPADM

## 2024-11-19 RX ORDER — LEVOTHYROXINE SODIUM 100 UG/1
100 TABLET ORAL DAILY
Status: DISCONTINUED | OUTPATIENT
Start: 2024-11-20 | End: 2024-11-22 | Stop reason: HOSPADM

## 2024-11-19 RX ORDER — GABAPENTIN 300 MG/1
300 CAPSULE ORAL 3 TIMES DAILY
Status: DISCONTINUED | OUTPATIENT
Start: 2024-11-20 | End: 2024-11-22 | Stop reason: HOSPADM

## 2024-11-19 RX ORDER — ASPIRIN 81 MG/1
81 TABLET ORAL DAILY
Status: DISCONTINUED | OUTPATIENT
Start: 2024-11-20 | End: 2024-11-22 | Stop reason: HOSPADM

## 2024-11-19 RX ORDER — DIPHENHYDRAMINE HCL 25 MG
25 CAPSULE ORAL EVERY 6 HOURS PRN
Status: DISCONTINUED | OUTPATIENT
Start: 2024-11-19 | End: 2024-11-22 | Stop reason: HOSPADM

## 2024-11-19 RX ORDER — METOPROLOL TARTRATE 1 MG/ML
5 INJECTION, SOLUTION INTRAVENOUS
Status: COMPLETED | OUTPATIENT
Start: 2024-11-19 | End: 2024-11-19

## 2024-11-19 RX ORDER — SERTRALINE HYDROCHLORIDE 50 MG/1
100 TABLET, FILM COATED ORAL DAILY
Status: DISCONTINUED | OUTPATIENT
Start: 2024-11-20 | End: 2024-11-22 | Stop reason: HOSPADM

## 2024-11-19 RX ORDER — NITROGLYCERIN 0.4 MG/1
0.4 TABLET SUBLINGUAL EVERY 5 MIN PRN
Status: DISCONTINUED | OUTPATIENT
Start: 2024-11-19 | End: 2024-11-22 | Stop reason: HOSPADM

## 2024-11-19 RX ORDER — NITROGLYCERIN 0.4 MG/1
0.4 TABLET SUBLINGUAL EVERY 5 MIN PRN
Status: DISCONTINUED | OUTPATIENT
Start: 2024-11-19 | End: 2024-11-19 | Stop reason: SDUPTHER

## 2024-11-19 RX ORDER — ALBUTEROL SULFATE 90 UG/1
2 INHALANT RESPIRATORY (INHALATION) EVERY 6 HOURS PRN
Status: DISCONTINUED | OUTPATIENT
Start: 2024-11-19 | End: 2024-11-19 | Stop reason: SDUPTHER

## 2024-11-19 RX ADMIN — HEPARIN SODIUM 7500 UNITS: 5000 INJECTION INTRAVENOUS; SUBCUTANEOUS at 10:11

## 2024-11-19 RX ADMIN — NITROGLYCERIN 1 INCH: 20 OINTMENT TOPICAL at 06:11

## 2024-11-19 RX ADMIN — METOROPROLOL TARTRATE 5 MG: 5 INJECTION, SOLUTION INTRAVENOUS at 06:11

## 2024-11-19 NOTE — Clinical Note
The procedural consent was signed. A history and physical note was completed in the chart. Detail Level: Generalized Detail Level: Zone Detail Level: Detailed Detail Level: Simple Topical Retinoids Recommendations: OTC neutragina acne wash

## 2024-11-19 NOTE — ED PROVIDER NOTES
Encounter Date: 11/19/2024    SCRIBE #1 NOTE: I, Victor Hugojami Wilson, am scribing for, and in the presence of,  Edmundo Wing MD.       History     Chief Complaint   Patient presents with    Chest Pain    Shortness of Breath     Per [place]  pt has started having CP w/ SOB approx 1 hr PTA. Pt has had 325mg of ASA and x3 .4mg of NTG.      84 y.o. female with PMHx of CAD, HTN, HLD, GERD, MI s/p stent placement, presents to the ED for evaluation of 10/10 chest pain x PTA. She states that the chest pain is across her chest and reports of associated worsening chronic SOB and diaphoresis. States that she was given 325 mg ASA and 4x 0.4mg NTG PTA with improvement of chest pain to a 3/10 in severity. She reports this episode being similar to her prior MI. She follows with cardiologist Dr. Yadav. No other medications taken for symptoms. Denies fever, chills, cough, congestion, rhinorrhea, nausea, vomiting, or any associated symptoms.     The history is provided by the patient. No  was used.     Review of patient's allergies indicates:   Allergen Reactions    Keflex [cephalexin] Itching    Moxifloxacin Itching    Penicillins Hives    Codeine Itching and Nausea Only     Past Medical History:   Diagnosis Date    Arthritis     Chronic kidney disease     stage 3     Coronary artery disease     Hypertension     Obesity (BMI 30-39.9)     Thyroid disease      Past Surgical History:   Procedure Laterality Date    ABDOMINAL SURGERY      removal scar tissue abdomen    BACK SURGERY      x3    CHOLECYSTECTOMY      CORONARY ANGIOPLASTY  07/25/2019    prox LAD 2.75x26 Resolute [place] ROBERTO    HYSTERECTOMY      JOINT REPLACEMENT      Rt total knee    LEFT HEART CATHETERIZATION Left 7/25/2019    Procedure: Left heart cath R rad access;  Surgeon: Carlos Alberto Yadav MD;  Location: [facility] CATH LAB;  Service: Cardiology;  Laterality: Left;    LEFT HEART CATHETERIZATION Left 10/15/2019    Procedure: Left heart cath 10am  start, R rad access;  Surgeon: Carlos Alberto Yadav MD;  Location: [facility] CATH LAB;  Service: Cardiology;  Laterality: Left;  RN PREOP 10/11/2019    LEFT HEART CATHETERIZATION Left 2020    Procedure: Left heart cath 12pm start R rad access;  Surgeon: Carlos Alberto Yadav MD;  Location: [facility] CATH LAB;  Service: Cardiology;  Laterality: Left;  RN PREOP 2020-----COVID NEGATIVE ON     REVISION OF SCAR TISSUE RECTUS MUSCLE      after gall bladder surgery     No family history on file.  Social History     Tobacco Use    Smoking status: Former     Current packs/day: 0.00     Types: Cigarettes     Quit date:      Years since quittin.9    Smokeless tobacco: Never   Substance Use Topics    Alcohol use: No    Drug use: No     Review of Systems   Constitutional:  Positive for diaphoresis. Negative for chills and fever.   HENT:  Negative for congestion, rhinorrhea and sore throat.    Eyes:  Negative for visual disturbance.   Respiratory:  Positive for shortness of breath. Negative for cough.    Cardiovascular:  Positive for chest pain.   Gastrointestinal:  Negative for abdominal pain, nausea and vomiting.   Genitourinary:  Negative for difficulty urinating.   Musculoskeletal:  Negative for back pain.   Skin:  Negative for rash.   Neurological:  Negative for headaches.       Physical Exam     Initial Vitals [24 1542]   BP Pulse Resp Temp SpO2   (!) 144/78 71 20 99.4 °F (37.4 °C) 99 %      MAP       --         Physical Exam    Nursing note and vitals reviewed.  Constitutional: She appears well-developed and well-nourished.   Eyes: EOM are normal. Pupils are equal, round, and reactive to light.   Neck: Neck supple. No thyromegaly present. No JVD present.   Normal range of motion.  Cardiovascular:  Normal rate, regular rhythm, normal heart sounds and intact distal pulses.     Exam reveals no gallop and no friction rub.       No murmur heard.  Pulmonary/Chest: No respiratory distress.   Mild breathless  speech.    Abdominal: Abdomen is soft. Bowel sounds are normal.   Musculoskeletal:         General: No tenderness or edema. Normal range of motion.      Cervical back: Normal range of motion and neck supple.     Neurological: She is alert and oriented to person, place, and time. She has normal strength.   Skin: Skin is warm and dry.         ED Course   Procedures  Labs Reviewed   CBC W/ AUTO DIFFERENTIAL - Abnormal       Result Value    WBC 7.77      RBC 3.64 (*)     Hemoglobin 11.7 (*)     Hematocrit 35.8 (*)     MCV 98      MCH 32.1 (*)     MCHC 32.7      RDW 13.9      Platelets 162      MPV 11.3      Immature Granulocytes 0.9 (*)     Gran # (ANC) 4.8      Immature Grans (Abs) 0.07 (*)     Lymph # 2.1      Mono # 0.7      Eos # 0.1      Baso # 0.02      nRBC 0      Gran % 61.1      Lymph % 27.4      Mono % 9.0      Eosinophil % 1.3      Basophil % 0.3      Differential Method Automated     COMPREHENSIVE METABOLIC PANEL - Abnormal    Sodium 139      Potassium 3.6      Chloride 108      CO2 22 (*)     Glucose 87      BUN 18      Creatinine 1.3      Calcium 9.6      Total Protein 6.7      Albumin 3.5      Total Bilirubin 0.5      Alkaline Phosphatase 85      AST 18      ALT 13      eGFR 40 (*)     Anion Gap 9     B-TYPE NATRIURETIC PEPTIDE - Abnormal     (*)    TROPONIN I    Troponin I <0.006     TROPONIN I    Troponin I 0.008       EKG Readings: (Independently Interpreted)   No STEMI. Sinus Rhythm. Rate of 65. PVC. Low voltage.      ECG Results              EKG 12-lead (Final result)  Result time 11/20/24 10:54:19      Final result by Unknown User (11/20/24 10:54:19)                                      Imaging Results              X-Ray Chest AP Portable (Final result)  Result time 11/19/24 18:02:46      Final result by Caesar Laura MD (11/19/24 18:02:46)                   Impression:      1. Interstitial findings are accentuated by habitus and shallow inspiratory effort, no large focal  consolidation.      Electronically signed by: Caesar Laura MD  Date:    11/19/2024  Time:    18:02               Narrative:    EXAMINATION:  XR CHEST AP PORTABLE    CLINICAL HISTORY:  Chest Pain;    TECHNIQUE:  Single frontal view of the chest was performed.    COMPARISON:  05/20/2024    FINDINGS:  The cardiomediastinal silhouette is not enlarged noting calcification of the aorta..  There is no pleural effusion.  The trachea is midline.  The lungs are symmetrically expanded bilaterally with mildly coarse interstitial attenuation.  There is left basilar subsegmental atelectasis..  No large focal consolidation seen.  There is no pneumothorax.  The osseous structures are remarkable for degenerative change..                                       Medications   aspirin EC tablet 81 mg (81 mg Oral Given 11/20/24 0948)   atorvastatin tablet 80 mg (80 mg Oral Given 11/20/24 0948)   diphenhydrAMINE capsule 25 mg (has no administration in time range)   gabapentin capsule 300 mg (300 mg Oral Given 11/20/24 1404)   levothyroxine tablet 100 mcg (100 mcg Oral Given 11/20/24 0948)   meclizine tablet 25 mg (has no administration in time range)   nitroGLYCERIN SL tablet 0.4 mg (has no administration in time range)   sertraline tablet 100 mg (100 mg Oral Given 11/20/24 0948)   metoprolol tartrate (LOPRESSOR) split tablet 12.5 mg (12.5 mg Oral Given 11/20/24 0948)   albuterol sulfate nebulizer solution 5 mg (has no administration in time range)   lisinopriL tablet 20 mg (20 mg Oral Given 11/20/24 1121)   nitroGLYCERIN 2% TD oint ointment 1 inch (1 inch Topical (Top) Given 11/19/24 1810)   metoprolol injection 5 mg (5 mg Intravenous Given 11/19/24 1810)     Medical Decision Making  This is an emergent evaluation of a 84 y.o. female who presents with 10/10 chest pain, worsening chronic SOB and diaphoresis x PTA. The patient was seen and examined. The history and physical exam was obtained. The nursing notes and vital signs were  reviewed. Secondary to symptoms and examination findings, I ordered labs, imaging, and EKG.      Amount and/or Complexity of Data Reviewed  Labs: ordered. Decision-making details documented in ED Course.  Radiology: ordered. Decision-making details documented in ED Course.  ECG/medicine tests: ordered and independent interpretation performed. Decision-making details documented in ED Course.    Risk  Prescription drug management.    Patient with known coronary artery disease presents complaining of chest pain consistent with prior cardiac chest pain.  Initially negative.  Patient has significant improvement of symptoms with sublingual nitroglycerins.  Will place in observation for rule out ACS.        Scribe Attestation:   Scribe #1: I performed the above scribed service and the documentation accurately describes the services I performed. I attest to the accuracy of the note.                             I, steffanie Wing, personally performed the services described in this documentation. All medical record entries made by the scribe were at my direction and in my presence. I have reviewed the chart and agree that the record reflects my personal performance and is accurate and complete.      DISCLAIMER: This note was prepared with [employer] voice recognition transcription software. Garbled syntax, mangled pronouns, and other bizarre constructions may be attributed to that software system.      Clinical Impression:  Final diagnoses:  [R07.9] Chest pain          ED Disposition Condition    Observation                 Steffanie Wing MD  11/20/24 3375

## 2024-11-19 NOTE — Clinical Note
The DP pulses were 2+ bilaterally. The PT pulses were 1+ bilaterally. The right radial pulse was +1.

## 2024-11-20 LAB
ALBUMIN SERPL BCP-MCNC: 3.2 G/DL (ref 3.5–5.2)
ALP SERPL-CCNC: 72 U/L (ref 40–150)
ALT SERPL W/O P-5'-P-CCNC: 12 U/L (ref 10–44)
ANION GAP SERPL CALC-SCNC: 10 MMOL/L (ref 8–16)
AST SERPL-CCNC: 17 U/L (ref 10–40)
BASOPHILS # BLD AUTO: 0.01 K/UL (ref 0–0.2)
BASOPHILS NFR BLD: 0.1 % (ref 0–1.9)
BILIRUB SERPL-MCNC: 0.4 MG/DL (ref 0.1–1)
BUN SERPL-MCNC: 22 MG/DL (ref 8–23)
CALCIUM SERPL-MCNC: 9.6 MG/DL (ref 8.7–10.5)
CHLORIDE SERPL-SCNC: 111 MMOL/L (ref 95–110)
CO2 SERPL-SCNC: 25 MMOL/L (ref 23–29)
CREAT SERPL-MCNC: 1.4 MG/DL (ref 0.5–1.4)
DIFFERENTIAL METHOD BLD: ABNORMAL
EOSINOPHIL # BLD AUTO: 0.1 K/UL (ref 0–0.5)
EOSINOPHIL NFR BLD: 1.5 % (ref 0–8)
ERYTHROCYTE [DISTWIDTH] IN BLOOD BY AUTOMATED COUNT: 13.9 % (ref 11.5–14.5)
EST. GFR  (NO RACE VARIABLE): 37 ML/MIN/1.73 M^2
ESTIMATED AVG GLUCOSE: 103 MG/DL (ref 68–131)
GLUCOSE SERPL-MCNC: 92 MG/DL (ref 70–110)
HBA1C MFR BLD: 5.2 % (ref 4–5.6)
HCT VFR BLD AUTO: 35.9 % (ref 37–48.5)
HGB BLD-MCNC: 11.5 G/DL (ref 12–16)
IMM GRANULOCYTES # BLD AUTO: 0.05 K/UL (ref 0–0.04)
IMM GRANULOCYTES NFR BLD AUTO: 0.7 % (ref 0–0.5)
LYMPHOCYTES # BLD AUTO: 2.4 K/UL (ref 1–4.8)
LYMPHOCYTES NFR BLD: 35.9 % (ref 18–48)
MCH RBC QN AUTO: 32.1 PG (ref 27–31)
MCHC RBC AUTO-ENTMCNC: 32 G/DL (ref 32–36)
MCV RBC AUTO: 100 FL (ref 82–98)
MONOCYTES # BLD AUTO: 0.7 K/UL (ref 0.3–1)
MONOCYTES NFR BLD: 10.9 % (ref 4–15)
NEUTROPHILS # BLD AUTO: 3.4 K/UL (ref 1.8–7.7)
NEUTROPHILS NFR BLD: 50.9 % (ref 38–73)
NRBC BLD-RTO: 0 /100 WBC
OHS QRS DURATION: 74 MS
OHS QRS DURATION: 78 MS
OHS QTC CALCULATION: 421 MS
OHS QTC CALCULATION: 445 MS
PLATELET # BLD AUTO: 148 K/UL (ref 150–450)
PMV BLD AUTO: 12.1 FL (ref 9.2–12.9)
POTASSIUM SERPL-SCNC: 3.9 MMOL/L (ref 3.5–5.1)
PROT SERPL-MCNC: 6.2 G/DL (ref 6–8.4)
RBC # BLD AUTO: 3.58 M/UL (ref 4–5.4)
SODIUM SERPL-SCNC: 146 MMOL/L (ref 136–145)
TROPONIN I SERPL DL<=0.01 NG/ML-MCNC: 0.01 NG/ML (ref 0–0.03)
TROPONIN I SERPL DL<=0.01 NG/ML-MCNC: <0.006 NG/ML (ref 0–0.03)
TSH SERPL DL<=0.005 MIU/L-ACNC: 0.82 UIU/ML (ref 0.4–4)
WBC # BLD AUTO: 6.69 K/UL (ref 3.9–12.7)

## 2024-11-20 PROCEDURE — 94761 N-INVAS EAR/PLS OXIMETRY MLT: CPT

## 2024-11-20 PROCEDURE — 96372 THER/PROPH/DIAG INJ SC/IM: CPT

## 2024-11-20 PROCEDURE — 80053 COMPREHEN METABOLIC PANEL: CPT | Performed by: NURSE PRACTITIONER

## 2024-11-20 PROCEDURE — 84443 ASSAY THYROID STIM HORMONE: CPT | Performed by: NURSE PRACTITIONER

## 2024-11-20 PROCEDURE — 85025 COMPLETE CBC W/AUTO DIFF WBC: CPT | Performed by: NURSE PRACTITIONER

## 2024-11-20 PROCEDURE — G0378 HOSPITAL OBSERVATION PER HR: HCPCS

## 2024-11-20 PROCEDURE — 99223 1ST HOSP IP/OBS HIGH 75: CPT | Mod: ,,, | Performed by: INTERNAL MEDICINE

## 2024-11-20 PROCEDURE — 36415 COLL VENOUS BLD VENIPUNCTURE: CPT

## 2024-11-20 PROCEDURE — 94799 UNLISTED PULMONARY SVC/PX: CPT

## 2024-11-20 PROCEDURE — 93005 ELECTROCARDIOGRAM TRACING: CPT

## 2024-11-20 PROCEDURE — 93010 ELECTROCARDIOGRAM REPORT: CPT | Mod: ,,, | Performed by: INTERNAL MEDICINE

## 2024-11-20 PROCEDURE — 27000221 HC OXYGEN, UP TO 24 HOURS

## 2024-11-20 PROCEDURE — 63600175 PHARM REV CODE 636 W HCPCS

## 2024-11-20 PROCEDURE — 99900035 HC TECH TIME PER 15 MIN (STAT)

## 2024-11-20 PROCEDURE — 36415 COLL VENOUS BLD VENIPUNCTURE: CPT | Mod: XB | Performed by: NURSE PRACTITIONER

## 2024-11-20 PROCEDURE — 25000003 PHARM REV CODE 250: Performed by: NURSE PRACTITIONER

## 2024-11-20 PROCEDURE — 84484 ASSAY OF TROPONIN QUANT: CPT | Mod: 91

## 2024-11-20 PROCEDURE — 25000003 PHARM REV CODE 250

## 2024-11-20 RX ORDER — ONDANSETRON HYDROCHLORIDE 2 MG/ML
4 INJECTION, SOLUTION INTRAVENOUS EVERY 6 HOURS PRN
Status: DISCONTINUED | OUTPATIENT
Start: 2024-11-20 | End: 2024-11-22 | Stop reason: HOSPADM

## 2024-11-20 RX ORDER — POLYETHYLENE GLYCOL 3350 17 G/17G
17 POWDER, FOR SOLUTION ORAL 2 TIMES DAILY PRN
Status: DISCONTINUED | OUTPATIENT
Start: 2024-11-20 | End: 2024-11-22 | Stop reason: HOSPADM

## 2024-11-20 RX ORDER — LISINOPRIL 20 MG/1
20 TABLET ORAL DAILY
Status: DISCONTINUED | OUTPATIENT
Start: 2024-11-20 | End: 2024-11-22 | Stop reason: HOSPADM

## 2024-11-20 RX ADMIN — LEVOTHYROXINE SODIUM 100 MCG: 0.1 TABLET ORAL at 09:11

## 2024-11-20 RX ADMIN — GABAPENTIN 300 MG: 300 CAPSULE ORAL at 02:11

## 2024-11-20 RX ADMIN — LISINOPRIL 20 MG: 20 TABLET ORAL at 11:11

## 2024-11-20 RX ADMIN — SERTRALINE HYDROCHLORIDE 100 MG: 50 TABLET ORAL at 09:11

## 2024-11-20 RX ADMIN — METOPROLOL TARTRATE 12.5 MG: 25 TABLET, FILM COATED ORAL at 09:11

## 2024-11-20 RX ADMIN — ATORVASTATIN CALCIUM 80 MG: 40 TABLET, FILM COATED ORAL at 09:11

## 2024-11-20 RX ADMIN — ASPIRIN 81 MG: 81 TABLET, COATED ORAL at 09:11

## 2024-11-20 RX ADMIN — HEPARIN SODIUM 7500 UNITS: 5000 INJECTION INTRAVENOUS; SUBCUTANEOUS at 05:11

## 2024-11-20 RX ADMIN — GABAPENTIN 300 MG: 300 CAPSULE ORAL at 08:11

## 2024-11-20 RX ADMIN — GABAPENTIN 300 MG: 300 CAPSULE ORAL at 09:11

## 2024-11-20 NOTE — ASSESSMENT & PLAN NOTE
Patient coming to the hospital with evaluation of exertional substernal chest pain.  EKG nonischemic and troponin x2 negative  She describes the chest pain similar to her prior anginal symptoms.    Would recommend inpatient stress test.  Order placed.  Test to be done tomorrow morning.  NPO past midnight.    Continue aspirin/statin

## 2024-11-20 NOTE — NURSING
Patient received on the floor on stretcher with the transporter.alert oriented and conscious.skin intact but rashes over her left arm.no discomfort or pain at this time.Spo2 via NC at 2 L.tele box connected.bed in lowest position.call light in reach.instructed to call in  any need.will continue to monitor.

## 2024-11-20 NOTE — HPI
"Tyra Soliz is a 84 y.o. female with  BMI 42, CAD s/p ROBERTO to LAD in 2019, HTN, and HLD who presented to [facility] ED on 11/19/2024 for eval and treatment of chest pain.  They describe their pain as tight heaviness, 10/10, located beneath the sternum with radiation down the L arm.  She's been having intermittent mild discomfort for the past few weeks that has been relieved by nitro, but on the day of presentation around 14:30 her sxs came back as above so she called EMS.  She describes today's sxs as "exactly like my previous heart attack."  She reports she was sitting at sx onset, but crying because of the death of her dog the night before.  She was given 3x nitro and 3x baby ASA by EMS en route, and says her chest discomfort as of initial HM interview is 3/10.    Most recent LHC in Sept. 2020 by Dr. Yadav, findings: Dominance: Right.  LM: normal.  LAD: patent prox stent (7/25/19 2.75x26 Resolute [place] ROBERTO).  LCx: normal.  RCA: dom, prox 40-50%, unchanged vs angio 10/15/19.    ED course notable for the following: Pt in NAD.  Hypertensive and HR in 50s but other VS unremarkable.  Exam without JVD, murmurs, rales, or REBECCA.  Labs Trop 0.006 -> 0.011  (b/l 90).  BUN 18 Crt 1.3 eGFR 40 (all at her recent b/l)a.  EKG SR w PVCs but no obvious ST abnormalities.  Imaging: CXR clear.  ED therapy/stabilization measures: Nitro.  They were placed in observation under the care of [facility] for further evaluation and treatment.   "

## 2024-11-20 NOTE — HPI
Ms. Soliz is an 84-year-old female with past medical history of CAD status post LAD PCI in 2019, hypertension and hyperlipidemia who is coming to the hospital for evaluation of substernal chest pain.    Cardiology is consulted for further management.    Patient follows up with Dr. Yadav and she last saw him in September last year.  Due to anginal symptom, a coronary angiography was done in September of last year which did not reveal any significant blockages.  Medical management was recommended.    She has been having intermittent chest pain for last few days but yesterday it was more persistent.  It was substernal and was radiating to left arm.  Recently, she lost her dog as well and she is traumatized by the death of her dog.    EN route to the hospital, EMS gave her sublingual nitroglycerin with partial relief.    This morning, when I saw her she denied chest pain or shortness of breath.

## 2024-11-20 NOTE — SUBJECTIVE & OBJECTIVE
Past Medical History:   Diagnosis Date    Arthritis     Chronic kidney disease     stage 3     Coronary artery disease     Hypertension     Obesity (BMI 30-39.9)     Thyroid disease        Past Surgical History:   Procedure Laterality Date    ABDOMINAL SURGERY      removal scar tissue abdomen    BACK SURGERY      x3    CHOLECYSTECTOMY      CORONARY ANGIOPLASTY  07/25/2019    prox LAD 2.75x26 Resolute Daniel ROBERTO    HYSTERECTOMY      JOINT REPLACEMENT      Rt total knee    LEFT HEART CATHETERIZATION Left 7/25/2019    Procedure: Left heart cath R rad access;  Surgeon: Carlos Alberto Yadav MD;  Location: [facility] CATH LAB;  Service: Cardiology;  Laterality: Left;    LEFT HEART CATHETERIZATION Left 10/15/2019    Procedure: Left heart cath 10am start, R rad access;  Surgeon: Carlos Alberto Yadav MD;  Location: [facility] CATH LAB;  Service: Cardiology;  Laterality: Left;  RN PREOP 10/11/2019    LEFT HEART CATHETERIZATION Left 9/23/2020    Procedure: Left heart cath 12pm start, R rad access;  Surgeon: Carlos Alberto Yadav MD;  Location: [facility] CATH LAB;  Service: Cardiology;  Laterality: Left;  RN PREOP 9/21/2020-----COVID NEGATIVE ON 9/21    REVISION OF SCAR TISSUE RECTUS MUSCLE      after gall bladder surgery       Review of patient's allergies indicates:   Allergen Reactions    Keflex [cephalexin] Itching    Moxifloxacin Itching    Penicillins Hives    Codeine Itching and Nausea Only       No current facility-administered medications on file prior to encounter.     Current Outpatient Medications on File Prior to Encounter   Medication Sig    ergocalciferol (VITAMIN D2) 50,000 unit Cap Take 50,000 Units by mouth every 7 days. Taking on Monday's    furosemide (LASIX) 20 MG tablet Take 20 mg by mouth 2 (two) times daily.    gabapentin (NEURONTIN) 300 MG capsule Take 1 capsule (300 mg total) by mouth 3 (three) times daily.    levothyroxine (SYNTHROID) 100 MCG tablet Take 100 mcg by mouth once daily.    sertraline (ZOLOFT) 100 MG tablet Take  100 mg by mouth once daily.    albuterol (PROVENTIL/VENTOLIN HFA) 90 mcg/actuation inhaler Inhale 1-2 puffs into the lungs every 6 (six) hours as needed for Wheezing or Shortness of Breath. Rescue    aspirin (ECOTRIN) 81 MG EC tablet Take 1 tablet (81 mg total) by mouth once daily.    atorvastatin (LIPITOR) 80 MG tablet Take 1 tablet (80 mg total) by mouth once daily.    diphenhydrAMINE (BENADRYL) 25 mg capsule Take 1 each (25 mg total) by mouth every 6 (six) hours as needed for Itching.    hydrocodone-acetaminophen 5-325mg (NORCO) 5-325 mg per tablet Take 1 tablet by mouth every 6 (six) hours as needed for Pain.    lisinopriL (PRINIVIL,ZESTRIL) 5 MG tablet Take 5 mg by mouth once daily.    meclizine (ANTIVERT) 25 mg tablet Take 1 tablet (25 mg total) by mouth 3 (three) times daily as needed for Dizziness.    metoprolol tartrate (LOPRESSOR) 25 MG tablet Take 0.5 tablets (12.5 mg total) by mouth 2 (two) times daily.    nitroGLYCERIN (NITROSTAT) 0.4 MG SL tablet Place 1 tablet (0.4 mg total) under the tongue every 5 (five) minutes as needed for Chest pain.    valACYclovir (VALTREX) 1000 MG tablet Take 1 tablet (1,000 mg total) by mouth 3 (three) times daily. for 7 days     Family History    None       Tobacco Use    Smoking status: Former     Current packs/day: 0.00     Types: Cigarettes     Quit date:      Years since quittin.9    Smokeless tobacco: Never   Substance and Sexual Activity    Alcohol use: No    Drug use: No    Sexual activity: Never     Partners: Male     Review of Systems   Reason unable to perform ROS: ROS was performed and pertinent +s and -s are listed in HPI.     Objective:     Vital Signs (Most Recent):  Temp: 98.4 °F (36.9 °C) (24)  Pulse: (!) 49 (24)  Resp: 18 (24)  BP: (!) 167/73 (24)  SpO2: 98 % (24) Vital Signs (24h Range):  Temp:  [97 °F (36.1 °C)-99.4 °F (37.4 °C)] 98.4 °F (36.9 °C)  Pulse:  [49-84] 49  Resp:  [18-37] 18  SpO2:   [98 %-100 %] 98 %  BP: (144-191)/(73-90) 167/73     Weight: 87.2 kg (192 lb 3.9 oz)  Body mass index is 33 kg/m².     Physical Exam  Constitutional:       General: She is not in acute distress.     Appearance: Normal appearance. She is not ill-appearing.   HENT:      Head: Normocephalic.   Neck:      Comments: JVP not elevated  Cardiovascular:      Rate and Rhythm: Normal rate and regular rhythm.      Pulses: Normal pulses.      Heart sounds: Normal heart sounds.   Pulmonary:      Effort: Pulmonary effort is normal.      Breath sounds: Normal breath sounds.   Abdominal:      General: Abdomen is flat. Bowel sounds are normal.      Tenderness: There is no abdominal tenderness. There is no guarding.   Musculoskeletal:      Right lower leg: No edema.      Left lower leg: No edema.   Skin:     General: Skin is warm and dry.      Capillary Refill: Capillary refill takes less than 2 seconds.      Coloration: Skin is not jaundiced.   Neurological:      General: No focal deficit present.      Mental Status: She is alert and oriented to person, place, and time.   Psychiatric:         Mood and Affect: Mood normal.         Behavior: Behavior normal.                Significant Labs: All pertinent labs within the past 24 hours have been reviewed.  CBC:   Recent Labs   Lab 11/19/24  1657   WBC 7.77   HGB 11.7*   HCT 35.8*        CMP:   Recent Labs   Lab 11/19/24  1657      K 3.6      CO2 22*   GLU 87   BUN 18   CREATININE 1.3   CALCIUM 9.6   PROT 6.7   ALBUMIN 3.5   BILITOT 0.5   ALKPHOS 85   AST 18   ALT 13   ANIONGAP 9     Cardiac Markers:   Recent Labs   Lab 11/19/24  1657   *     Troponin:   Recent Labs   Lab 11/19/24  1657 11/19/24  1816 11/19/24  2047   TROPONINI <0.006 0.008 0.011       Significant Imaging: I have reviewed all pertinent imaging results/findings within the past 24 hours.

## 2024-11-20 NOTE — ASSESSMENT & PLAN NOTE
Creatine stable for now. BMP reviewed- noted Estimated Creatinine Clearance: 33.8 mL/min (based on SCr of 1.3 mg/dL). according to latest data. Based on current GFR, CKD stage is stage 3 - GFR 30-59.  Monitor UOP and serial BMP and adjust therapy as needed. Renally dose meds. Avoid nephrotoxic medications and procedures.

## 2024-11-20 NOTE — ASSESSMENT & PLAN NOTE
Hx NSTEMI  CAD  HLD  HTN    Patient describes chest pain as similar to her 2019 NSTEMI for which she got a ROBERTO to her LAD: tightness across her chest.  Started at rest while she was crying due to death of her dog the night before.  Their known cardiac risk factors include female older than 55 years of age, hyperlipidemia, hypertension, obesity, and a history of coronary artery disease.  EKG (see above) without ST changes c/f ischemia  Most recent Troponins (starting w latest at top):  Lab Results   Component Value Date    TROPONINI 0.011 11/19/2024    TROPONINI 0.008 11/19/2024    TROPONINI <0.006 11/19/2024    TROPONINI <0.006 05/20/2024    TROPONINI <0.006 05/01/2021      Lab Results   Component Value Date    CREATININE 1.3 11/19/2024       USMAN 3     We are concerned their chest pain may be ischemic in nature per the above data.  However, its onset in setting of grief over the loss of her dog makes transient Takotsubo cardiomyopathy a differential as well.  Admitting them to the hospital for further eval and treatment:    Cardiology consulted  Pt on UA/NSTEMI pathway  No ACS protocol for now; will only start if Trop bumps significantly.  Continue home ASA 81.  High intensity Statin: continue home rosuvastatin 20 as atorvastatin 80 due to formulary limitation  continue B-blocker Lopressor 12.5 as tolerated; hold if pt is bradycardic or develops symptoms concerning for active heart failure  TTE pending  Trend troponin (to peak or flat) and EKGs q6hr, or sooner if pt's sxs worsen  Lipid panel, TSH  NTG PRN for chest pain

## 2024-11-20 NOTE — SUBJECTIVE & OBJECTIVE
Past Medical History:   Diagnosis Date    Arthritis     Chronic kidney disease     stage 3     Coronary artery disease     Hypertension     Obesity (BMI 30-39.9)     Thyroid disease        Past Surgical History:   Procedure Laterality Date    ABDOMINAL SURGERY      removal scar tissue abdomen    BACK SURGERY      x3    CHOLECYSTECTOMY      CORONARY ANGIOPLASTY  07/25/2019    prox LAD 2.75x26 Resolute Daniel ROBERTO    HYSTERECTOMY      JOINT REPLACEMENT      Rt total knee    LEFT HEART CATHETERIZATION Left 7/25/2019    Procedure: Left heart cath R rad access;  Surgeon: Carlos Alberto Yadav MD;  Location: [facility] CATH LAB;  Service: Cardiology;  Laterality: Left;    LEFT HEART CATHETERIZATION Left 10/15/2019    Procedure: Left heart cath 10am start, R rad access;  Surgeon: Carlos Alberto Yadav MD;  Location: [facility] CATH LAB;  Service: Cardiology;  Laterality: Left;  RN PREOP 10/11/2019    LEFT HEART CATHETERIZATION Left 9/23/2020    Procedure: Left heart cath 12pm start, R rad access;  Surgeon: Carlos Alberto Yadav MD;  Location: [facility] CATH LAB;  Service: Cardiology;  Laterality: Left;  RN PREOP 9/21/2020-----COVID NEGATIVE ON 9/21    REVISION OF SCAR TISSUE RECTUS MUSCLE      after gall bladder surgery       Review of patient's allergies indicates:   Allergen Reactions    Keflex [cephalexin] Itching    Moxifloxacin Itching    Penicillins Hives    Codeine Itching and Nausea Only       No current facility-administered medications on file prior to encounter.     Current Outpatient Medications on File Prior to Encounter   Medication Sig    ergocalciferol (VITAMIN D2) 50,000 unit Cap Take 50,000 Units by mouth every 7 days. Taking on Monday's    furosemide (LASIX) 20 MG tablet Take 20 mg by mouth 2 (two) times daily.    gabapentin (NEURONTIN) 300 MG capsule Take 1 capsule (300 mg total) by mouth 3 (three) times daily.    levothyroxine (SYNTHROID) 100 MCG tablet Take 100 mcg by mouth once daily.    sertraline (ZOLOFT) 100 MG tablet Take  100 mg by mouth once daily.    albuterol (PROVENTIL/VENTOLIN HFA) 90 mcg/actuation inhaler Inhale 1-2 puffs into the lungs every 6 (six) hours as needed for Wheezing or Shortness of Breath. Rescue    aspirin (ECOTRIN) 81 MG EC tablet Take 1 tablet (81 mg total) by mouth once daily.    atorvastatin (LIPITOR) 80 MG tablet Take 1 tablet (80 mg total) by mouth once daily.    diphenhydrAMINE (BENADRYL) 25 mg capsule Take 1 each (25 mg total) by mouth every 6 (six) hours as needed for Itching.    hydrocodone-acetaminophen 5-325mg (NORCO) 5-325 mg per tablet Take 1 tablet by mouth every 6 (six) hours as needed for Pain.    lisinopriL (PRINIVIL,ZESTRIL) 5 MG tablet Take 5 mg by mouth once daily.    meclizine (ANTIVERT) 25 mg tablet Take 1 tablet (25 mg total) by mouth 3 (three) times daily as needed for Dizziness.    metoprolol tartrate (LOPRESSOR) 25 MG tablet Take 0.5 tablets (12.5 mg total) by mouth 2 (two) times daily.    nitroGLYCERIN (NITROSTAT) 0.4 MG SL tablet Place 1 tablet (0.4 mg total) under the tongue every 5 (five) minutes as needed for Chest pain.    valACYclovir (VALTREX) 1000 MG tablet Take 1 tablet (1,000 mg total) by mouth 3 (three) times daily. for 7 days     Family History    None       Tobacco Use    Smoking status: Former     Current packs/day: 0.00     Types: Cigarettes     Quit date:      Years since quittin.9    Smokeless tobacco: Never   Substance and Sexual Activity    Alcohol use: No    Drug use: No    Sexual activity: Never     Partners: Male     Review of Systems   Cardiovascular:  Negative for chest pain, claudication, dyspnea on exertion, irregular heartbeat, leg swelling, near-syncope, orthopnea, palpitations, paroxysmal nocturnal dyspnea and syncope.   Respiratory:  Negative for cough, shortness of breath, snoring and sputum production.    Gastrointestinal:  Negative for abdominal pain, dysphagia, heartburn, nausea and vomiting.   Neurological:  Negative for dizziness, headaches,  loss of balance and weakness.     Objective:     Vital Signs (Most Recent):  Temp: 98.2 °F (36.8 °C) (11/20/24 1044)  Pulse: 66 (11/20/24 1050)  Resp: 18 (11/20/24 1044)  BP: 132/61 (11/20/24 1044)  SpO2: 99 % (11/20/24 1044) Vital Signs (24h Range):  Temp:  [97 °F (36.1 °C)-99.4 °F (37.4 °C)] 98.2 °F (36.8 °C)  Pulse:  [49-84] 66  Resp:  [18-37] 18  SpO2:  [98 %-100 %] 99 %  BP: (132-191)/(61-90) 132/61     Weight: 87.2 kg (192 lb 3.9 oz)  Body mass index is 33 kg/m².    SpO2: 99 %       No intake or output data in the 24 hours ending 11/20/24 1219    Lines/Drains/Airways       Peripheral Intravenous Line  Duration                  Peripheral IV - Single Lumen 11/19/24 1545 18 G Anterior;Proximal;Right Forearm <1 day                     Physical Exam  HENT:      Head: Normocephalic and atraumatic.      Mouth/Throat:      Mouth: Mucous membranes are moist.   Eyes:      Extraocular Movements: Extraocular movements intact.      Pupils: Pupils are equal, round, and reactive to light.   Cardiovascular:      Rate and Rhythm: Normal rate and regular rhythm.      Pulses: Normal pulses.      Heart sounds: Normal heart sounds.   Pulmonary:      Effort: Pulmonary effort is normal.      Breath sounds: Normal breath sounds.   Abdominal:      General: Bowel sounds are normal.      Palpations: Abdomen is soft.   Musculoskeletal:      Left lower leg: No edema.   Skin:     General: Skin is warm.   Neurological:      General: No focal deficit present.      Mental Status: She is alert.   Psychiatric:         Mood and Affect: Mood normal.         Behavior: Behavior normal.          Current Medications:   aspirin  81 mg Oral Daily    atorvastatin  80 mg Oral Daily    gabapentin  300 mg Oral TID    levothyroxine  100 mcg Oral Daily    lisinopriL  20 mg Oral Daily    metoprolol tartrate  12.5 mg Oral BID    sertraline  100 mg Oral Daily         Current Facility-Administered Medications:     albuterol sulfate, 5 mg, Nebulization, Q6H  PRN    diphenhydrAMINE, 25 mg, Oral, Q6H PRN    meclizine, 25 mg, Oral, TID PRN    nitroGLYCERIN, 0.4 mg, Sublingual, Q5 Min PRN    Laboratory (all labs reviewed):  CBC:  Recent Labs   Lab 06/06/23  1739 05/20/24 2055 11/19/24 1657 11/20/24  0613   WBC 6.35 6.35 7.77 6.69   Hemoglobin 13.5 11.8 L 11.7 L 11.5 L   Hematocrit 42.3 36.8 L 35.8 L 35.9 L   Platelets 197 131 L 162 148 L       CHEMISTRIES:  Recent Labs   Lab 07/17/23  0920 11/27/23  1142 05/20/24 2055 11/19/24 1657 11/20/24  0613   Glucose 103 H 115 113 H 87 92   Sodium 140 141 138 139 146 H   Potassium 4.6 4.7 4.2 3.6 3.9   BUN  --   --  30 H 18 22   Blood Urea Nitrogen 21 23  --   --   --    Creatinine 1.47 H 1.34 H 1.8 H 1.3 1.4   eGFR 35 L 39 L 27 A 40 A 37 A   Calcium 10.1 9.8 9.9 9.6 9.6       CARDIAC BIOMARKERS:  Recent Labs   Lab 11/19/24 1657 11/19/24 1816 11/19/24 2047 11/20/24  0055 11/20/24  0447   Troponin I <0.006 0.008 0.011 0.008 <0.006       COAGS:  Recent Labs   Lab 11/19/24 2047   INR 1.0       LIPIDS/LFTS:  Recent Labs   Lab 07/17/23  0920 11/27/23  1142 04/15/24  1618 05/20/24 2055 08/16/24  0903 11/19/24  1657 11/19/24 2047 11/20/24  0613   Cholesterol  --   --  118  --  151  --  126  --    Triglycerides  --   --  67  --  80  --  53  --    HDL  --   --  60 H  --  66 H  --  58  --    LDL Cholesterol  --   --   --   --   --   --  57.4 L  --    LDL Calculated  --   --  45  --  69  --   --   --    Non-HDL Cholesterol  --   --  58  --  85  --  68  --    AST 15 14  --  18  --  18  --  17   ALT 11 14  --  13  --  13  --  12       BNP:  Recent Labs   Lab 05/20/24 2055 11/19/24  1657   BNP 90 175 H       TSH:  Recent Labs   Lab 11/20/24  0613   TSH 0.821       Free T4:        Diagnostic Results:  ECG (personally reviewed and interpreted tracing(s)):  EKG done on 19th November 2024 showed sinus rhythm with occasional PVCs    Chest X-Ray (personally reviewed and interpreted image(s)):  Chest x-ray done in the ED did not show any acute  cardiopulmonary process    STRESS TEST: 9/2019    The perfusion scan is free of evidence from myocardial ischemia or injury.    There is a mild intensity defect in the anteroapical wall of the left ventricle, secondary to breast attenuation.    Gated perfusion images showed an ejection fraction of 72 % post stress.    The EKG portion of this study is negative for ischemia.    There were no arrhythmias during stress.    The patient reported no chest pain during the stress test.    ECHO: 9/2021  Normal left ventricular systolic function. The estimated ejection fraction is 65%.  Grade II (moderate) left ventricular diastolic dysfunction consistent with pseudonormalization.  Moderate right ventricular enlargement.  Normal right ventricular systolic function.  Mild left atrial enlargement.  Mild right atrial enlargement.  Mild to moderate tricuspid regurgitation.  The estimated PA systolic pressure is 36 mmHg.    Cath: 9/2023  CP reminiscent of prior angina  2V CAD, normal LV fxn  Widely patent prox LAD stent, stable moderate RCA stenosis (unchanged vs angio 10/15/19, neg iFR at that time)  R rad vasband for hemostasis

## 2024-11-20 NOTE — NURSING
[facility], [facility]  Nurses Note -- 4 Eyes      11/19/2024       Skin assessed on: Q Shift    Rashes present on left Daniele and Fore arm.    [x] No Pressure Injuries Present    [x]Prevention Measures Documented    [] Yes LDA  for Pressure Injury Previously documented     [] Yes New Pressure Injury Discovered   [] LDA for New Pressure Injury Added      Attending RN:  Girish Mahoney RN     Second RN:  HIEU Howell

## 2024-11-20 NOTE — H&P
"    [facility] Medicine  History & Physical    Patient Name: Tyra Soliz  MRN: [id number]  Patient Class: OP- Observation  Admission Date: 11/19/2024  Attending Physician: Jh Murphy MD   Primary Care Provider: Marii Trejo MD         Patient information was obtained from patient, past medical records, and ER records.     Subjective:     Principal Problem:Chest pain    Chief Complaint:   Chief Complaint   Patient presents with    Chest Pain    Shortness of Breath     Per [facility]eyer  pt has started having CP w/ SOB approx 1 hr PTA. Pt has had 325mg of ASA and x3 .4mg of NTG.         HPI: Tyra Soliz is a 84 y.o. female with  BMI 42, CAD s/p ROBERTO to LAD in 2019, HTN, and HLD who presented to [facility] ED on 11/19/2024 for eval and treatment of chest pain.  They describe their pain as tight heaviness, 10/10, located beneath the sternum with radiation down the L arm.  She's been having intermittent mild discomfort for the past few weeks that has been relieved by nitro, but on the day of presentation around 14:30 her sxs came back as above so she called EMS.  She describes today's sxs as "exactly like my previous heart attack."  She reports she was sitting at sx onset, but crying because of the death of her dog the night before.  She was given 3x nitro and 3x baby ASA by EMS en route, and says her chest discomfort as of initial  interview is 3/10.    Most recent LHC in Sept. 2020 by Dr. Yadav, findings: Dominance: Right.  LM: normal.  LAD: patent prox stent (7/25/19 2.75x26 Resolute [place] ROBERTO).  LCx: normal.  RCA: dom, prox 40-50%, unchanged vs angio 10/15/19.    ED course notable for the following: Pt in NAD.  Hypertensive and HR in 50s but other VS unremarkable.  Exam without JVD, murmurs, rales, or REBECCA.  Labs Trop 0.006 -> 0.011  (b/l 90).  BUN 18 Crt 1.3 eGFR 40 (all at her recent b/l)a.  EKG SR w PVCs but no obvious ST abnormalities.  Imaging: CXR clear.  " ED therapy/stabilization measures: Nitro.  They were placed in observation under the care of [facility] for further evaluation and treatment.     Past Medical History:   Diagnosis Date    Arthritis     Chronic kidney disease     stage 3     Coronary artery disease     Hypertension     Obesity (BMI 30-39.9)     Thyroid disease        Past Surgical History:   Procedure Laterality Date    ABDOMINAL SURGERY      removal scar tissue abdomen    BACK SURGERY      x3    CHOLECYSTECTOMY      CORONARY ANGIOPLASTY  07/25/2019    prox LAD 2.75x26 Resolute Daniel ROBERTO    HYSTERECTOMY      JOINT REPLACEMENT      Rt total knee    LEFT HEART CATHETERIZATION Left 7/25/2019    Procedure: Left heart cath R rad access;  Surgeon: Carlos Alberto Yadav MD;  Location: [facility] CATH LAB;  Service: Cardiology;  Laterality: Left;    LEFT HEART CATHETERIZATION Left 10/15/2019    Procedure: Left heart cath 10am start, R rad access;  Surgeon: Carlos Alberto Yadav MD;  Location: [facility] CATH LAB;  Service: Cardiology;  Laterality: Left;  RN PREOP 10/11/2019    LEFT HEART CATHETERIZATION Left 9/23/2020    Procedure: Left heart cath 12pm start, R rad access;  Surgeon: Carlos Alberto Yadav MD;  Location: [facility] CATH LAB;  Service: Cardiology;  Laterality: Left;  RN PREOP 9/21/2020-----COVID NEGATIVE ON 9/21    REVISION OF SCAR TISSUE RECTUS MUSCLE      after gall bladder surgery       Review of patient's allergies indicates:   Allergen Reactions    Keflex [cephalexin] Itching    Moxifloxacin Itching    Penicillins Hives    Codeine Itching and Nausea Only       No current facility-administered medications on file prior to encounter.     Current Outpatient Medications on File Prior to Encounter   Medication Sig    ergocalciferol (VITAMIN D2) 50,000 unit Cap Take 50,000 Units by mouth every 7 days. Taking on Monday's    furosemide (LASIX) 20 MG tablet Take 20 mg by mouth 2 (two) times daily.    gabapentin (NEURONTIN) 300 MG capsule Take 1 capsule  (300 mg total) by mouth 3 (three) times daily.    levothyroxine (SYNTHROID) 100 MCG tablet Take 100 mcg by mouth once daily.    sertraline (ZOLOFT) 100 MG tablet Take 100 mg by mouth once daily.    albuterol (PROVENTIL/VENTOLIN HFA) 90 mcg/actuation inhaler Inhale 1-2 puffs into the lungs every 6 (six) hours as needed for Wheezing or Shortness of Breath. Rescue    aspirin (ECOTRIN) 81 MG EC tablet Take 1 tablet (81 mg total) by mouth once daily.    atorvastatin (LIPITOR) 80 MG tablet Take 1 tablet (80 mg total) by mouth once daily.    diphenhydrAMINE (BENADRYL) 25 mg capsule Take 1 each (25 mg total) by mouth every 6 (six) hours as needed for Itching.    hydrocodone-acetaminophen 5-325mg (NORCO) 5-325 mg per tablet Take 1 tablet by mouth every 6 (six) hours as needed for Pain.    lisinopriL (PRINIVIL,ZESTRIL) 5 MG tablet Take 5 mg by mouth once daily.    meclizine (ANTIVERT) 25 mg tablet Take 1 tablet (25 mg total) by mouth 3 (three) times daily as needed for Dizziness.    metoprolol tartrate (LOPRESSOR) 25 MG tablet Take 0.5 tablets (12.5 mg total) by mouth 2 (two) times daily.    nitroGLYCERIN (NITROSTAT) 0.4 MG SL tablet Place 1 tablet (0.4 mg total) under the tongue every 5 (five) minutes as needed for Chest pain.    valACYclovir (VALTREX) 1000 MG tablet Take 1 tablet (1,000 mg total) by mouth 3 (three) times daily. for 7 days     Family History    None       Tobacco Use    Smoking status: Former     Current packs/day: 0.00     Types: Cigarettes     Quit date:      Years since quittin.9    Smokeless tobacco: Never   Substance and Sexual Activity    Alcohol use: No    Drug use: No    Sexual activity: Never     Partners: Male     Review of Systems   Reason unable to perform ROS: ROS was performed and pertinent +s and -s are listed in HPI.     Objective:     Vital Signs (Most Recent):  Temp: 98.4 °F (36.9 °C) (24)  Pulse: (!) 49 (24)  Resp: 18 (24)  BP: (!) 167/73 (24  2359)  SpO2: 98 % (11/19/24 2359) Vital Signs (24h Range):  Temp:  [97 °F (36.1 °C)-99.4 °F (37.4 °C)] 98.4 °F (36.9 °C)  Pulse:  [49-84] 49  Resp:  [18-37] 18  SpO2:  [98 %-100 %] 98 %  BP: (144-191)/(73-90) 167/73     Weight: 87.2 kg (192 lb 3.9 oz)  Body mass index is 33 kg/m².     Physical Exam  Constitutional:       General: She is not in acute distress.     Appearance: Normal appearance. She is not ill-appearing.   HENT:      Head: Normocephalic.   Neck:      Comments: JVP not elevated  Cardiovascular:      Rate and Rhythm: Normal rate and regular rhythm.      Pulses: Normal pulses.      Heart sounds: Normal heart sounds.   Pulmonary:      Effort: Pulmonary effort is normal.      Breath sounds: Normal breath sounds.   Abdominal:      General: Abdomen is flat. Bowel sounds are normal.      Tenderness: There is no abdominal tenderness. There is no guarding.   Musculoskeletal:      Right lower leg: No edema.      Left lower leg: No edema.   Skin:     General: Skin is warm and dry.      Capillary Refill: Capillary refill takes less than 2 seconds.      Coloration: Skin is not jaundiced.   Neurological:      General: No focal deficit present.      Mental Status: She is alert and oriented to person, place, and time.   Psychiatric:         Mood and Affect: Mood normal.         Behavior: Behavior normal.                Significant Labs: All pertinent labs within the past 24 hours have been reviewed.  CBC:   Recent Labs   Lab 11/19/24  1657   WBC 7.77   HGB 11.7*   HCT 35.8*        CMP:   Recent Labs   Lab 11/19/24  1657      K 3.6      CO2 22*   GLU 87   BUN 18   CREATININE 1.3   CALCIUM 9.6   PROT 6.7   ALBUMIN 3.5   BILITOT 0.5   ALKPHOS 85   AST 18   ALT 13   ANIONGAP 9     Cardiac Markers:   Recent Labs   Lab 11/19/24  1657   *     Troponin:   Recent Labs   Lab 11/19/24  1657 11/19/24  1816 11/19/24  2047   TROPONINI <0.006 0.008 0.011       Significant Imaging: I have reviewed all  pertinent imaging results/findings within the past 24 hours.      Assessment/Plan:     * Chest pain  Hx NSTEMI  CAD  HLD  HTN    Patient describes chest pain as similar to her 2019 NSTEMI for which she got a ROBERTO to her LAD: tightness across her chest.  Started at rest while she was crying due to death of her dog the night before.  Their known cardiac risk factors include female older than 55 years of age, hyperlipidemia, hypertension, obesity, and a history of coronary artery disease.  EKG (see above) without ST changes c/f ischemia  Most recent Troponins (starting w latest at top):  Lab Results   Component Value Date    TROPONINI 0.011 11/19/2024    TROPONINI 0.008 11/19/2024    TROPONINI <0.006 11/19/2024    TROPONINI <0.006 05/20/2024    TROPONINI <0.006 05/01/2021      Lab Results   Component Value Date    CREATININE 1.3 11/19/2024       USMAN 3     We are concerned their chest pain may be ischemic in nature per the above data.  However, its onset in setting of grief over the loss of her dog makes transient Takotsubo cardiomyopathy a differential as well.  Admitting them to the hospital for further eval and treatment:    Cardiology consulted  Pt on UA/NSTEMI pathway  No ACS protocol for now; will only start if Trop bumps significantly.  Continue home ASA 81.  High intensity Statin: continue home rosuvastatin 20 as atorvastatin 80 due to formulary limitation  continue B-blocker Lopressor 12.5 as tolerated; hold if pt is bradycardic or develops symptoms concerning for active heart failure  TTE pending  Trend troponin (to peak or flat) and EKGs q6hr, or sooner if pt's sxs worsen  Lipid panel, TSH  NTG PRN for chest pain          Stage 3 chronic kidney disease  Creatine stable for now. BMP reviewed- noted Estimated Creatinine Clearance: 33.8 mL/min (based on SCr of 1.3 mg/dL). according to latest data. Based on current GFR, CKD stage is stage 3 - GFR 30-59.  Monitor UOP and serial BMP and adjust therapy as needed.  Renally dose meds. Avoid nephrotoxic medications and procedures.      VTE Risk Mitigation (From admission, onward)           Ordered     heparin (porcine) injection 7,500 Units  Every 8 hours         11/19/24 1842     IP VTE HIGH RISK PATIENT  Once         11/19/24 1842     Place sequential compression device  Until discontinued         11/19/24 1842                       On 11/20/2024, patient should be placed in hospital observation services under my care.             Dedrick England MD  Department of Hospital Medicine  [facility]

## 2024-11-20 NOTE — PLAN OF CARE
Problem: Chest Pain  Goal: Resolution of Chest Pain Symptoms  Intervention: Manage Acute Chest Pain  Flowsheets (Taken 11/20/2024 9855)  Chest Pain Intervention:   cardiac biomarkers drawn   cardiac monitoring continued   cardiac monitor placed   oxygen applied

## 2024-11-20 NOTE — NURSING
12 lead EKG done.Sinus Tonio with HR-59 bpm. no chest pain complained.lying on bed.alert oriented.

## 2024-11-20 NOTE — CONSULTS
Food & Nutrition  Education    Diet Education: Cardiac Diet Education  Time Spent: 15 min  Learners: Patient      Nutrition Education provided with handouts: Heart Healthy Nutrition Therapy      Comments: Educated patient on heart healthy diet. Discussed not seasoning food with salt and trying salt-free seasoning alternatives. Encourage pt to make the swap from canned to frozen/fresh foods, and making sure to rinse canned good before eating to reduce sodium content. Discussed using healthier fats such as olive oil vs butter. Emphasized the importance of reading the nutrition label. Pt voiced understanding.       All questions and concerns answered. Dietitian's contact information provided.       Follow-Up: 11/27/24    Please Re-consult as needed        Thanks!

## 2024-11-20 NOTE — CARE UPDATE
Admission for chest pain in which she contributes to being emotional as had to put her dog to sleep yesterday. Patient also reports intermittent chest pain >1 month in which patient would take nitro xl that resolves the pain. Patient history of CAD with stent to LAD and last [facility] on 9/20/2020 showed widely patent LAD stent and mod RCA stenosis 40-50%. EKG SB and troponin trend x 3 negative.   Currently denies chest pain.  Continue ASA/statin/ACEI/BB  Follow up 2 echo  NPO until seen by  Cardiology. NST?    Results for orders placed during the hospital encounter of 09/12/19    Stress test with myocardial perfusion    Interpretation Summary    The perfusion scan is free of evidence from myocardial ischemia or injury.    There is a mild intensity defect in the anteroapical wall of the left ventricle, secondary to breast attenuation.    Gated perfusion images showed an ejection fraction of 72 % post stress.    The EKG portion of this study is negative for ischemia.    There were no arrhythmias during stress.    The patient reported no chest pain during the stress test.      Addendum   Elevated blood pressure  Increase home lisinopril  Hydralazine prn   Due to schedule availability, NST tomorrow 11/12/2024     Wendi Wilson NP  Staff: Jh Murphy MD

## 2024-11-20 NOTE — CONSULTS
[facility] - Telemetry  Cardiology  Consult Note    Patient Name: Tyra Soliz  MRN: [id number]  Admission Date: 11/19/2024  Hospital Length of Stay: 0 days  Code Status: Full Code   Attending Provider: Jh Murphy MD   Consulting Provider: Dana Young MD  Primary Care Physician: Marii Trejo MD  Principal Problem:Chest pain    Patient information was obtained from patient and ER records.     Inpatient consult to Cardiology  Consult performed by: Dana Young MD  Consult ordered by: Dedrick England MD        Subjective:     Chief Complaint:  Chest pain    HPI:   Ms. Soliz is an 84-year-old female with past medical history of CAD status post LAD PCI in 2019, hypertension and hyperlipidemia who is coming to the hospital for evaluation of substernal chest pain.    Cardiology is consulted for further management.    Patient follows up with Dr. Yadav and she last saw him in September last year.  Due to anginal symptom, a coronary angiography was done in September of last year which did not reveal any significant blockages.  Medical management was recommended.    She has been having intermittent chest pain for last few days but yesterday it was more persistent.  It was substernal and was radiating to left arm.  Recently, she lost her dog as well and she is traumatized by the death of her dog.    EN route to the hospital, EMS gave her sublingual nitroglycerin with partial relief.    This morning, when I saw her she denied chest pain or shortness of breath.    Past Medical History:   Diagnosis Date    Arthritis     Chronic kidney disease     stage 3     Coronary artery disease     Hypertension     Obesity (BMI 30-39.9)     Thyroid disease        Past Surgical History:   Procedure Laterality Date    ABDOMINAL SURGERY      removal scar tissue abdomen    BACK SURGERY      x3    CHOLECYSTECTOMY      CORONARY ANGIOPLASTY  07/25/2019    prox LAD 2.75x26 Resolute Daniel ROBERTO    HYSTERECTOMY       JOINT REPLACEMENT      Rt total knee    LEFT HEART CATHETERIZATION Left 7/25/2019    Procedure: Left heart cath R rad access;  Surgeon: Carlos Alberto Yadav MD;  Location: [facility] CATH LAB;  Service: Cardiology;  Laterality: Left;    LEFT HEART CATHETERIZATION Left 10/15/2019    Procedure: Left heart cath 10am start, R rad access;  Surgeon: Carlos Alberto Yadav MD;  Location: [facility] CATH LAB;  Service: Cardiology;  Laterality: Left;  RN PREOP 10/11/2019    LEFT HEART CATHETERIZATION Left 9/23/2020    Procedure: Left heart cath 12pm start, R rad access;  Surgeon: Carlos Alberto Yadav MD;  Location: [facility] CATH LAB;  Service: Cardiology;  Laterality: Left;  RN PREOP 9/21/2020-----COVID NEGATIVE ON 9/21    REVISION OF SCAR TISSUE RECTUS MUSCLE      after gall bladder surgery       Review of patient's allergies indicates:   Allergen Reactions    Keflex [cephalexin] Itching    Moxifloxacin Itching    Penicillins Hives    Codeine Itching and Nausea Only       No current facility-administered medications on file prior to encounter.     Current Outpatient Medications on File Prior to Encounter   Medication Sig    ergocalciferol (VITAMIN D2) 50,000 unit Cap Take 50,000 Units by mouth every 7 days. Taking on Monday's    furosemide (LASIX) 20 MG tablet Take 20 mg by mouth 2 (two) times daily.    gabapentin (NEURONTIN) 300 MG capsule Take 1 capsule (300 mg total) by mouth 3 (three) times daily.    levothyroxine (SYNTHROID) 100 MCG tablet Take 100 mcg by mouth once daily.    sertraline (ZOLOFT) 100 MG tablet Take 100 mg by mouth once daily.    albuterol (PROVENTIL/VENTOLIN HFA) 90 mcg/actuation inhaler Inhale 1-2 puffs into the lungs every 6 (six) hours as needed for Wheezing or Shortness of Breath. Rescue    aspirin (ECOTRIN) 81 MG EC tablet Take 1 tablet (81 mg total) by mouth once daily.    atorvastatin (LIPITOR) 80 MG tablet Take 1 tablet (80 mg total) by mouth once daily.    diphenhydrAMINE (BENADRYL) 25 mg capsule Take 1 each (25 mg  total) by mouth every 6 (six) hours as needed for Itching.    hydrocodone-acetaminophen 5-325mg (NORCO) 5-325 mg per tablet Take 1 tablet by mouth every 6 (six) hours as needed for Pain.    lisinopriL (PRINIVIL,ZESTRIL) 5 MG tablet Take 5 mg by mouth once daily.    meclizine (ANTIVERT) 25 mg tablet Take 1 tablet (25 mg total) by mouth 3 (three) times daily as needed for Dizziness.    metoprolol tartrate (LOPRESSOR) 25 MG tablet Take 0.5 tablets (12.5 mg total) by mouth 2 (two) times daily.    nitroGLYCERIN (NITROSTAT) 0.4 MG SL tablet Place 1 tablet (0.4 mg total) under the tongue every 5 (five) minutes as needed for Chest pain.    valACYclovir (VALTREX) 1000 MG tablet Take 1 tablet (1,000 mg total) by mouth 3 (three) times daily. for 7 days     Family History    None       Tobacco Use    Smoking status: Former     Current packs/day: 0.00     Types: Cigarettes     Quit date:      Years since quittin.    Smokeless tobacco: Never   Substance and Sexual Activity    Alcohol use: No    Drug use: No    Sexual activity: Never     Partners: Male     Review of Systems   Cardiovascular:  Negative for chest pain, claudication, dyspnea on exertion, irregular heartbeat, leg swelling, near-syncope, orthopnea, palpitations, paroxysmal nocturnal dyspnea and syncope.   Respiratory:  Negative for cough, shortness of breath, snoring and sputum production.    Gastrointestinal:  Negative for abdominal pain, dysphagia, heartburn, nausea and vomiting.   Neurological:  Negative for dizziness, headaches, loss of balance and weakness.     Objective:     Vital Signs (Most Recent):  Temp: 98.2 °F (36.8 °C) (24 1044)  Pulse: 66 (24 1050)  Resp: 18 (24 1044)  BP: 132/61 (24 1044)  SpO2: 99 % (24 1044) Vital Signs (24h Range):  Temp:  [97 °F (36.1 °C)-99.4 °F (37.4 °C)] 98.2 °F (36.8 °C)  Pulse:  [49-84] 66  Resp:  [18-37] 18  SpO2:  [98 %-100 %] 99 %  BP: (132-191)/(61-90) 132/61     Weight: 87.2 kg (192  lb 3.9 oz)  Body mass index is 33 kg/m².    SpO2: 99 %       No intake or output data in the 24 hours ending 11/20/24 1219    Lines/Drains/Airways       Peripheral Intravenous Line  Duration                  Peripheral IV - Single Lumen 11/19/24 1545 18 G Anterior;Proximal;Right Forearm <1 day                     Physical Exam  HENT:      Head: Normocephalic and atraumatic.      Mouth/Throat:      Mouth: Mucous membranes are moist.   Eyes:      Extraocular Movements: Extraocular movements intact.      Pupils: Pupils are equal, round, and reactive to light.   Cardiovascular:      Rate and Rhythm: Normal rate and regular rhythm.      Pulses: Normal pulses.      Heart sounds: Normal heart sounds.   Pulmonary:      Effort: Pulmonary effort is normal.      Breath sounds: Normal breath sounds.   Abdominal:      General: Bowel sounds are normal.      Palpations: Abdomen is soft.   Musculoskeletal:      Left lower leg: No edema.   Skin:     General: Skin is warm.   Neurological:      General: No focal deficit present.      Mental Status: She is alert.   Psychiatric:         Mood and Affect: Mood normal.         Behavior: Behavior normal.          Current Medications:   aspirin  81 mg Oral Daily    atorvastatin  80 mg Oral Daily    gabapentin  300 mg Oral TID    levothyroxine  100 mcg Oral Daily    lisinopriL  20 mg Oral Daily    metoprolol tartrate  12.5 mg Oral BID    sertraline  100 mg Oral Daily         Current Facility-Administered Medications:     albuterol sulfate, 5 mg, Nebulization, Q6H PRN    diphenhydrAMINE, 25 mg, Oral, Q6H PRN    meclizine, 25 mg, Oral, TID PRN    nitroGLYCERIN, 0.4 mg, Sublingual, Q5 Min PRN    Laboratory (all labs reviewed):  CBC:  Recent Labs   Lab 06/06/23  1739 05/20/24  2055 11/19/24  1657 11/20/24  0613   WBC 6.35 6.35 7.77 6.69   Hemoglobin 13.5 11.8 L 11.7 L 11.5 L   Hematocrit 42.3 36.8 L 35.8 L 35.9 L   Platelets 197 131 L 162 148 L       CHEMISTRIES:  Recent Labs   Lab 07/17/23  0920  11/27/23  1142 05/20/24 2055 11/19/24 1657 11/20/24  0613   Glucose 103 H 115 113 H 87 92   Sodium 140 141 138 139 146 H   Potassium 4.6 4.7 4.2 3.6 3.9   BUN  --   --  30 H 18 22   Blood Urea Nitrogen 21 23  --   --   --    Creatinine 1.47 H 1.34 H 1.8 H 1.3 1.4   eGFR 35 L 39 L 27 A 40 A 37 A   Calcium 10.1 9.8 9.9 9.6 9.6       CARDIAC BIOMARKERS:  Recent Labs   Lab 11/19/24 1657 11/19/24  1816 11/19/24 2047 11/20/24  0055 11/20/24  0447   Troponin I <0.006 0.008 0.011 0.008 <0.006       COAGS:  Recent Labs   Lab 11/19/24 2047   INR 1.0       LIPIDS/LFTS:  Recent Labs   Lab 07/17/23  0920 11/27/23  1142 04/15/24  1618 05/20/24 2055 08/16/24  0903 11/19/24 1657 11/19/24 2047 11/20/24  0613   Cholesterol  --   --  118  --  151  --  126  --    Triglycerides  --   --  67  --  80  --  53  --    HDL  --   --  60 H  --  66 H  --  58  --    LDL Cholesterol  --   --   --   --   --   --  57.4 L  --    LDL Calculated  --   --  45  --  69  --   --   --    Non-HDL Cholesterol  --   --  58  --  85  --  68  --    AST 15 14  --  18  --  18  --  17   ALT 11 14  --  13  --  13  --  12       BNP:  Recent Labs   Lab 05/20/24 2055 11/19/24 1657   BNP 90 175 H       TSH:  Recent Labs   Lab 11/20/24  0613   TSH 0.821       Free T4:        Diagnostic Results:  ECG (personally reviewed and interpreted tracing(s)):  EKG done on 19th November 2024 showed sinus rhythm with occasional PVCs    Chest X-Ray (personally reviewed and interpreted image(s)):  Chest x-ray done in the ED did not show any acute cardiopulmonary process    STRESS TEST: 9/2019    The perfusion scan is free of evidence from myocardial ischemia or injury.    There is a mild intensity defect in the anteroapical wall of the left ventricle, secondary to breast attenuation.    Gated perfusion images showed an ejection fraction of 72 % post stress.    The EKG portion of this study is negative for ischemia.    There were no arrhythmias during stress.    The patient  reported no chest pain during the stress test.    ECHO: 9/2021  Normal left ventricular systolic function. The estimated ejection fraction is 65%.  Grade II (moderate) left ventricular diastolic dysfunction consistent with pseudonormalization.  Moderate right ventricular enlargement.  Normal right ventricular systolic function.  Mild left atrial enlargement.  Mild right atrial enlargement.  Mild to moderate tricuspid regurgitation.  The estimated PA systolic pressure is 36 mmHg.    Cath: 9/2023  CP reminiscent of prior angina  2V CAD, normal LV fxn  Widely patent prox LAD stent, stable moderate RCA stenosis (unchanged vs angio 10/15/19, neg iFR at that time)  R rad vasband for hemostasis  Assessment and Plan:     * Chest pain  Patient coming to the hospital with evaluation of exertional substernal chest pain.  EKG nonischemic and troponin x2 negative  She describes the chest pain similar to her prior anginal symptoms.    Would recommend inpatient stress test.  Order placed.  Test to be done tomorrow morning.  NPO past midnight.    Continue aspirin/statin    Essential hypertension  Continue Lopressor and lisinopril        VTE Risk Mitigation (From admission, onward)           Ordered     Place GI hose  Until discontinued         11/20/24 0605     IP VTE HIGH RISK PATIENT  Once         11/19/24 1842     Place sequential compression device  Until discontinued         11/19/24 1842                      Dana Young MD  Cardiology   [facility] - Telemetry

## 2024-11-21 LAB
AORTIC ROOT ANNULUS: 3.27 CM
AORTIC VALVE CUSP SEPERATION: 2.03 CM
ASCENDING AORTA: 2.94 CM
AV INDEX (PROSTH): 0.82
AV MEAN GRADIENT: 6 MMHG
AV PEAK GRADIENT: 11.6 MMHG
AV VALVE AREA BY VELOCITY RATIO: 2.6 CM²
AV VALVE AREA: 2.6 CM²
AV VELOCITY RATIO: 0.82
BSA FOR ECHO PROCEDURE: 2 M2
CV ECHO LV RWT: 0.54 CM
CV STRESS BASE HR: 49 BPM
DIASTOLIC BLOOD PRESSURE: 58 MMHG
DOP CALC AO PEAK VEL: 1.7 M/S
DOP CALC AO VTI: 41.4 CM
DOP CALC LVOT AREA: 3.1 CM2
DOP CALC LVOT DIAMETER: 2 CM
DOP CALC LVOT PEAK VEL: 1.4 M/S
DOP CALC LVOT STROKE VOLUME: 107.1 CM3
DOP CALCLVOT PEAK VEL VTI: 34.1 CM
E WAVE DECELERATION TIME: 271.53 MSEC
E/A RATIO: 0.95
E/E' RATIO: 11.63 M/S
ECHO LV POSTERIOR WALL: 1.1 CM (ref 0.6–1.1)
FRACTIONAL SHORTENING: 36.6 % (ref 28–44)
INTERVENTRICULAR SEPTUM: 1.3 CM (ref 0.6–1.1)
IVC DIAMETER: 1.53 CM
IVRT: 102.76 MSEC
LA MAJOR: 5.9 CM
LA MINOR: 3.69 CM
LA WIDTH: 3.7 CM
LEFT ATRIUM SIZE: 3.07 CM
LEFT ATRIUM VOLUME INDEX: 22.6 ML/M2
LEFT ATRIUM VOLUME: 43.84 CM3
LEFT INTERNAL DIMENSION IN SYSTOLE: 2.6 CM (ref 2.1–4)
LEFT VENTRICLE DIASTOLIC VOLUME INDEX: 37.99 ML/M2
LEFT VENTRICLE DIASTOLIC VOLUME: 73.7 ML
LEFT VENTRICLE MASS INDEX: 88.5 G/M2
LEFT VENTRICLE SYSTOLIC VOLUME INDEX: 12.1 ML/M2
LEFT VENTRICLE SYSTOLIC VOLUME: 23.39 ML
LEFT VENTRICULAR INTERNAL DIMENSION IN DIASTOLE: 4.1 CM (ref 3.5–6)
LEFT VENTRICULAR MASS: 171.7 G
LV LATERAL E/E' RATIO: 10.33 M/S
LV SEPTAL E/E' RATIO: 13.29 M/S
LVED V (TEICH): 73.7 ML
LVES V (TEICH): 23.39 ML
LVOT MG: 4.25 MMHG
LVOT MV: 0.98 CM/S
MV PEAK A VEL: 0.98 M/S
MV PEAK E VEL: 0.93 M/S
MV STENOSIS PRESSURE HALF TIME: 78.75 MS
MV VALVE AREA P 1/2 METHOD: 2.79 CM2
NUC STRESS DIASTOLIC VOLUME INDEX: 65
NUC STRESS EJECTION FRACTION: 69 %
NUC STRESS SYSTOLIC VOLUME INDEX: 20
OHS CV CPX 85 PERCENT MAX PREDICTED HEART RATE MALE: 115
OHS CV CPX MAX PREDICTED HEART RATE: 135
OHS CV CPX PATIENT IS FEMALE: 1
OHS CV CPX PATIENT IS MALE: 0
OHS CV CPX PEAK DIASTOLIC BLOOD PRESSURE: 52 MMHG
OHS CV CPX PEAK HEAR RATE: 77 BPM
OHS CV CPX PEAK RATE PRESSURE PRODUCT: NORMAL
OHS CV CPX PEAK SYSTOLIC BLOOD PRESSURE: 166 MMHG
OHS CV CPX PERCENT MAX PREDICTED HEART RATE ACHIEVED: 59
OHS CV CPX RATE PRESSURE PRODUCT PRESENTING: 7399
OHS CV INITIAL DOSE: 10.7 MCG/KG/MIN
OHS CV PEAK DOSE: 30.4 MCG/KG/MIN
OHS QRS DURATION: 86 MS
OHS QTC CALCULATION: 392 MS
PISA TR MAX VEL: 2.98 M/S
PULM VEIN S/D RATIO: 1.52
PV PEAK D VEL: 0.48 M/S
PV PEAK GRADIENT: 5 MMHG
PV PEAK S VEL: 0.73 M/S
PV PEAK VELOCITY: 1.12 M/S
RA MAJOR: 5.33 CM
RA WIDTH: 4.45 CM
RIGHT VENTRICULAR END-DIASTOLIC DIMENSION: 3.56 CM
SINUS: 3.31 CM
STJ: 2.62 CM
SYSTOLIC BLOOD PRESSURE: 151 MMHG
TDI LATERAL: 0.09 M/S
TDI SEPTAL: 0.07 M/S
TDI: 0.08 M/S
TR MAX PG: 36 MMHG
TRICUSPID ANNULAR PLANE SYSTOLIC EXCURSION: 1.75 CM
Z-SCORE OF LEFT VENTRICULAR DIMENSION IN END DIASTOLE: -2.96
Z-SCORE OF LEFT VENTRICULAR DIMENSION IN END SYSTOLE: -2.1

## 2024-11-21 PROCEDURE — 25000003 PHARM REV CODE 250

## 2024-11-21 PROCEDURE — A9502 TC99M TETROFOSMIN: HCPCS | Performed by: STUDENT IN AN ORGANIZED HEALTH CARE EDUCATION/TRAINING PROGRAM

## 2024-11-21 PROCEDURE — 25000003 PHARM REV CODE 250: Performed by: HOSPITALIST

## 2024-11-21 PROCEDURE — 63600175 PHARM REV CODE 636 W HCPCS: Performed by: INTERNAL MEDICINE

## 2024-11-21 PROCEDURE — 94761 N-INVAS EAR/PLS OXIMETRY MLT: CPT

## 2024-11-21 PROCEDURE — 93005 ELECTROCARDIOGRAM TRACING: CPT

## 2024-11-21 PROCEDURE — 25000003 PHARM REV CODE 250: Performed by: INTERNAL MEDICINE

## 2024-11-21 PROCEDURE — 99499 UNLISTED E&M SERVICE: CPT | Mod: ,,, | Performed by: INTERNAL MEDICINE

## 2024-11-21 PROCEDURE — 11000001 HC ACUTE MED/SURG PRIVATE ROOM

## 2024-11-21 PROCEDURE — 25000003 PHARM REV CODE 250: Performed by: NURSE PRACTITIONER

## 2024-11-21 PROCEDURE — 93010 ELECTROCARDIOGRAM REPORT: CPT | Mod: ,,, | Performed by: INTERNAL MEDICINE

## 2024-11-21 RX ORDER — SODIUM CHLORIDE 0.9 % (FLUSH) 0.9 %
10 SYRINGE (ML) INJECTION
Status: DISCONTINUED | OUTPATIENT
Start: 2024-11-21 | End: 2024-11-22 | Stop reason: HOSPADM

## 2024-11-21 RX ORDER — CLOPIDOGREL BISULFATE 300 MG/1
300 TABLET, FILM COATED ORAL ONCE
Status: COMPLETED | OUTPATIENT
Start: 2024-11-21 | End: 2024-11-21

## 2024-11-21 RX ORDER — REGADENOSON 0.08 MG/ML
0.4 INJECTION, SOLUTION INTRAVENOUS ONCE
Status: COMPLETED | OUTPATIENT
Start: 2024-11-21 | End: 2024-11-21

## 2024-11-21 RX ORDER — CLOPIDOGREL BISULFATE 75 MG/1
75 TABLET ORAL DAILY
Status: DISCONTINUED | OUTPATIENT
Start: 2024-11-22 | End: 2024-11-22

## 2024-11-21 RX ORDER — ASPIRIN 81 MG/1
243 TABLET ORAL ONCE
Status: COMPLETED | OUTPATIENT
Start: 2024-11-21 | End: 2024-11-21

## 2024-11-21 RX ORDER — SODIUM CHLORIDE 9 MG/ML
INJECTION, SOLUTION INTRAVENOUS CONTINUOUS
Status: DISCONTINUED | OUTPATIENT
Start: 2024-11-22 | End: 2024-11-22

## 2024-11-21 RX ADMIN — LEVOTHYROXINE SODIUM 100 MCG: 0.1 TABLET ORAL at 11:11

## 2024-11-21 RX ADMIN — CLOPIDOGREL BISULFATE 300 MG: 300 TABLET, FILM COATED ORAL at 06:11

## 2024-11-21 RX ADMIN — TETROFOSMIN 10.7 MILLICURIE: 1.38 INJECTION, POWDER, LYOPHILIZED, FOR SOLUTION INTRAVENOUS at 07:11

## 2024-11-21 RX ADMIN — ASPIRIN 81 MG: 81 TABLET, COATED ORAL at 11:11

## 2024-11-21 RX ADMIN — GABAPENTIN 300 MG: 300 CAPSULE ORAL at 11:11

## 2024-11-21 RX ADMIN — REGADENOSON 0.4 MG: 0.08 INJECTION, SOLUTION INTRAVENOUS at 10:11

## 2024-11-21 RX ADMIN — ATORVASTATIN CALCIUM 80 MG: 40 TABLET, FILM COATED ORAL at 11:11

## 2024-11-21 RX ADMIN — GABAPENTIN 300 MG: 300 CAPSULE ORAL at 08:11

## 2024-11-21 RX ADMIN — LISINOPRIL 20 MG: 20 TABLET ORAL at 11:11

## 2024-11-21 RX ADMIN — TETROFOSMIN 30.4 MILLICURIE: 1.38 INJECTION, POWDER, LYOPHILIZED, FOR SOLUTION INTRAVENOUS at 10:11

## 2024-11-21 RX ADMIN — ASPIRIN 243 MG: 81 TABLET, COATED ORAL at 02:11

## 2024-11-21 RX ADMIN — GABAPENTIN 300 MG: 300 CAPSULE ORAL at 04:11

## 2024-11-21 RX ADMIN — SERTRALINE HYDROCHLORIDE 100 MG: 50 TABLET ORAL at 11:11

## 2024-11-21 NOTE — CARE UPDATE
Patient is seen and examined, case discussed with Dr. Young.    Patient with known CAD presenting with new onset nitroglycerin responsive chest discomfort similar to prior anginal symptoms, can not exclude ACS/unstable angina.  Nuclear stress test with mild inferior perfusion defect.    Pros and cons of angiogram discussed with the patient she agrees to proceed.  Permit has been signed.    Risks, benefits and alternatives of the catheterization procedure were discussed with the patient which include but are not limited to: bleeding, infection, death, heart attack, arrhythmia, kidney failure, stroke, need for emergency surgery, etc.  Patient understands and and agrees to proceed.  Consent was placed on the chart.

## 2024-11-21 NOTE — ASSESSMENT & PLAN NOTE
Hx NSTEMI  CAD  HLD  HTN    Patient describes chest pain as similar to her 2019 NSTEMI for which she got a ROBERTO to her LAD: tightness across her chest.  Started at rest while she was crying due to death of her dog the night before.  Their known cardiac risk factors include female older than 55 years of age, hyperlipidemia, hypertension, obesity, and a history of coronary artery disease.  EKG (see above) without ST changes c/f ischemia  Most recent Troponins (starting w latest at top):  Lab Results   Component Value Date    TROPONINI <0.006 11/20/2024    TROPONINI 0.008 11/20/2024    TROPONINI 0.011 11/19/2024    TROPONINI 0.008 11/19/2024    TROPONINI <0.006 11/19/2024      Lab Results   Component Value Date    CREATININE 1.4 11/20/2024       USMAN 3     We are concerned their chest pain may be ischemic in nature per the above data.  However, its onset in setting of grief over the loss of her dog makes transient Takotsubo cardiomyopathy a differential as well.  Admitting them to the hospital for further eval and treatment:    Cardiology consulted  Pt on UA/NSTEMI pathway  No ACS protocol for now; will only start if Trop bumps significantly.  Continue home ASA 81.  High intensity Statin: continue home rosuvastatin 20 as atorvastatin 80 due to formulary limitation  continue B-blocker Lopressor 12.5 as tolerated; hold if pt is bradycardic or develops symptoms concerning for active heart failure  TTE pending  Trend troponin (to peak or flat) and EKGs q6hr, or sooner if pt's sxs worsen  Lipid panel, TSH  NTG PRN for chest pain    11/21: positive stress test, NPO p MN for cath tomorrow

## 2024-11-21 NOTE — NURSING
[facility]  Nurses Note -- 4 Eyes      11/20/2024       Skin assessed on: Q Shift      [x] No Pressure Injuries Present    [x]Prevention Measures Documented    [] Yes LDA  for Pressure Injury Previously documented     [] Yes New Pressure Injury Discovered   [] LDA for New Pressure Injury Added      Attending RN:  Marlena Mclean RN     Second RN:  STELLA Gregory

## 2024-11-21 NOTE — HOSPITAL COURSE
Patient was seen and examined this morning.  Underwent nuclear stress test this morning.  Remained hemodynamically stable.  No further chest pain recurrence.

## 2024-11-21 NOTE — PLAN OF CARE
Problem: Adult Inpatient Plan of Care  Goal: Plan of Care Review  Outcome: Progressing  Goal: Patient-Specific Goal (Individualized)  Outcome: Progressing  Goal: Absence of Hospital-Acquired Illness or Injury  Outcome: Progressing  Goal: Optimal Comfort and Wellbeing  Outcome: Progressing  Goal: Readiness for Transition of Care  Outcome: Progressing     Problem: Bariatric Environmental Safety  Goal: Safety Maintained with Care  Outcome: Progressing     Problem: Acute Coronary Syndrome  Goal: Optimal Adaptation to Illness  Outcome: Progressing  Goal: Absence of Cardiac-Related Pain  Outcome: Progressing  Goal: Normalized Cardiac Rhythm  Outcome: Progressing  Goal: Effective Cardiac Pump Function  Outcome: Progressing  Goal: Adequate Tissue Perfusion  Outcome: Progressing     Problem: Cardiac Catheterization (Diagnostic/Interventional)  Goal: Absence of Bleeding  Outcome: Progressing  Goal: Absence of Contrast-Induced Injury  Outcome: Progressing  Goal: Stable Heart Rate and Rhythm  Outcome: Progressing  Goal: Absence of Embolism Signs and Symptoms  Outcome: Progressing  Goal: Anesthesia/Sedation Recovery  Outcome: Progressing  Goal: Optimal Pain Control and Function  Outcome: Progressing  Goal: Absence of Vascular Access Complication  Outcome: Progressing     Problem: Sepsis/Septic Shock  Goal: Optimal Coping  Outcome: Progressing  Goal: Absence of Bleeding  Outcome: Progressing  Goal: Blood Glucose Level Within Targeted Range  Outcome: Progressing  Goal: Absence of Infection Signs and Symptoms  Outcome: Progressing  Goal: Optimal Nutrition Intake  Outcome: Progressing     Problem: Pneumonia  Goal: Fluid Balance  Outcome: Progressing  Goal: Resolution of Infection Signs and Symptoms  Outcome: Progressing  Goal: Effective Oxygenation and Ventilation  Outcome: Progressing     Problem: Fall Injury Risk  Goal: Absence of Fall and Fall-Related Injury  Outcome: Progressing     Problem: Skin Injury Risk Increased  Goal:  Skin Health and Integrity  Outcome: Progressing     Problem: Chest Pain  Goal: Resolution of Chest Pain Symptoms  Outcome: Progressing

## 2024-11-21 NOTE — PLAN OF CARE
11/21/24 0809   Discharge Planning   Assessment Type Discharge Planning Brief Assessment   Resource/Environmental Concerns none   Support Systems Children   Equipment Currently Used at Home other (see comments)   Current Living Arrangements home   Patient/Family Anticipates Transition to home with family   Patient/Family Anticipated Services at Transition none   DME Needed Upon Discharge  none   Discharge Plan A Home with family  (with instructions to follow up)       [id number] [address] [place] [address] [facility]  [address] [address]  Phone: [phone] Fax: [phone]

## 2024-11-21 NOTE — SUBJECTIVE & OBJECTIVE
Interval History: no new complains, currently chest pain free    Review of Systems   Respiratory:  Negative for shortness of breath.    Cardiovascular:  Negative for chest pain.   All other systems reviewed and are negative.    Objective:     Vital Signs (Most Recent):  Temp: 98 °F (36.7 °C) (11/21/24 1111)  Pulse: (!) 58 (11/21/24 1525)  Resp: 18 (11/21/24 1111)  BP: (!) 113/53 (11/21/24 1111)  SpO2: 100 % (11/21/24 1111) Vital Signs (24h Range):  Temp:  [97.6 °F (36.4 °C)-98.4 °F (36.9 °C)] 98 °F (36.7 °C)  Pulse:  [45-73] 58  Resp:  [18] 18  SpO2:  [96 %-100 %] 100 %  BP: (102-130)/(46-60) 113/53     Weight: 88.5 kg (195 lb)  Body mass index is 33.47 kg/m².    Intake/Output Summary (Last 24 hours) at 11/21/2024 1535  Last data filed at 11/21/2024 1401  Gross per 24 hour   Intake 120 ml   Output --   Net 120 ml         Physical Exam  Vitals and nursing note reviewed.   Constitutional:       General: She is not in acute distress.     Appearance: She is well-developed.   HENT:      Head: Normocephalic and atraumatic.      Right Ear: External ear normal.      Left Ear: External ear normal.   Cardiovascular:      Rate and Rhythm: Normal rate and regular rhythm.   Pulmonary:      Effort: Pulmonary effort is normal. No respiratory distress.   Skin:     General: Skin is warm and dry.   Neurological:      Mental Status: She is alert and oriented to person, place, and time.   Psychiatric:         Thought Content: Thought content normal.             Significant Labs: All pertinent labs within the past 24 hours have been reviewed.    Significant Imaging: I have reviewed all pertinent imaging results/findings within the past 24 hours.

## 2024-11-21 NOTE — ASSESSMENT & PLAN NOTE
Stress test this morning was abnormal.  It showed a mild to moderate intensity, small to medium sized, reversible perfusion abnormality that is consistent with ischemia in the basal to mid inferior walls.  Discussed the need for coronary angiography.  She follows up with Dr. Yadav and he is starting the service from tomorrow.   Please make her NPO past midnight for coronary angiography tomorrow  Continue with statin.  We can give her full-dose aspirin (325 mg) today.

## 2024-11-21 NOTE — PLAN OF CARE
Problem: Adult Inpatient Plan of Care  Goal: Plan of Care Review  Outcome: Progressing  Flowsheets (Taken 11/20/2024 1918)  Plan of Care Reviewed With: patient     Problem: Pneumonia  Goal: Fluid Balance  Outcome: Progressing

## 2024-11-21 NOTE — PROGRESS NOTES
"[facility]  Progress Note    Patient Name: Tyra Soliz  MRN: [id number]  Patient Class: IP- Inpatient   Admission Date: 11/19/2024  Length of Stay: 0 days  Attending Physician: Jh Murphy MD  Primary Care Provider: Marii Trejo MD        Subjective:     Principal Problem:Chest pain        HPI:  Tyra Soliz is a 84 y.o. female with  BMI 42, CAD s/p ROBERTO to LAD in 2019, HTN, and HLD who presented to [facility] ED on 11/19/2024 for eval and treatment of chest pain.  They describe their pain as tight heaviness, 10/10, located beneath the sternum with radiation down the L arm.  She's been having intermittent mild discomfort for the past few weeks that has been relieved by nitro, but on the day of presentation around 14:30 her sxs came back as above so she called EMS.  She describes today's sxs as "exactly like my previous heart attack."  She reports she was sitting at sx onset, but crying because of the death of her dog the night before.  She was given 3x nitro and 3x baby ASA by EMS en route, and says her chest discomfort as of initial  interview is 3/10.    Most recent C in Sept. 2020 by Dr. Yadav, findings: Dominance: Right.  LM: normal.  LAD: patent prox stent (7/25/19 2.75x26 Resolute [place] ROBERTO).  LCx: normal.  RCA: dom, prox 40-50%, unchanged vs angio 10/15/19.    ED course notable for the following: Pt in NAD.  Hypertensive and HR in 50s but other VS unremarkable.  Exam without JVD, murmurs, rales, or REBECCA.  Labs Trop 0.006 -> 0.011  (b/l 90).  BUN 18 Crt 1.3 eGFR 40 (all at her recent b/l)a.  EKG SR w PVCs but no obvious ST abnormalities.  Imaging: CXR clear.  ED therapy/stabilization measures: Nitro.  They were placed in observation under the care of [facility] for further evaluation and treatment.     Overview/Hospital Course:  Mrs. Soliz was hospitalized for ACS rule out after presenting with chest pain. Troponin " remained negative, EKG without ST elevation; however, NST was positive for myocardial ischemia.  NPO p MN for likely heart cath tomorrow.    Interval History: no new complains, currently chest pain free    Review of Systems   Respiratory:  Negative for shortness of breath.    Cardiovascular:  Negative for chest pain.   All other systems reviewed and are negative.    Objective:     Vital Signs (Most Recent):  Temp: 98 °F (36.7 °C) (11/21/24 1111)  Pulse: (!) 58 (11/21/24 1525)  Resp: 18 (11/21/24 1111)  BP: (!) 113/53 (11/21/24 1111)  SpO2: 100 % (11/21/24 1111) Vital Signs (24h Range):  Temp:  [97.6 °F (36.4 °C)-98.4 °F (36.9 °C)] 98 °F (36.7 °C)  Pulse:  [45-73] 58  Resp:  [18] 18  SpO2:  [96 %-100 %] 100 %  BP: (102-130)/(46-60) 113/53     Weight: 88.5 kg (195 lb)  Body mass index is 33.47 kg/m².    Intake/Output Summary (Last 24 hours) at 11/21/2024 1535  Last data filed at 11/21/2024 1401  Gross per 24 hour   Intake 120 ml   Output --   Net 120 ml         Physical Exam  Vitals and nursing note reviewed.   Constitutional:       General: She is not in acute distress.     Appearance: She is well-developed.   HENT:      Head: Normocephalic and atraumatic.      Right Ear: External ear normal.      Left Ear: External ear normal.   Cardiovascular:      Rate and Rhythm: Normal rate and regular rhythm.   Pulmonary:      Effort: Pulmonary effort is normal. No respiratory distress.   Skin:     General: Skin is warm and dry.   Neurological:      Mental Status: She is alert and oriented to person, place, and time.   Psychiatric:         Thought Content: Thought content normal.             Significant Labs: All pertinent labs within the past 24 hours have been reviewed.    Significant Imaging: I have reviewed all pertinent imaging results/findings within the past 24 hours.    Assessment/Plan:      * Chest pain  Hx NSTEMI  CAD  HLD  HTN    Patient describes chest pain as similar to her 2019 NSTEMI for which she got a ROBERTO to  her LAD: tightness across her chest.  Started at rest while she was crying due to death of her dog the night before.  Their known cardiac risk factors include female older than 55 years of age, hyperlipidemia, hypertension, obesity, and a history of coronary artery disease.  EKG (see above) without ST changes c/f ischemia  Most recent Troponins (starting w latest at top):  Lab Results   Component Value Date    TROPONINI <0.006 11/20/2024    TROPONINI 0.008 11/20/2024    TROPONINI 0.011 11/19/2024    TROPONINI 0.008 11/19/2024    TROPONINI <0.006 11/19/2024      Lab Results   Component Value Date    CREATININE 1.4 11/20/2024       USMAN 3     We are concerned their chest pain may be ischemic in nature per the above data.  However, its onset in setting of grief over the loss of her dog makes transient Takotsubo cardiomyopathy a differential as well.  Admitting them to the hospital for further eval and treatment:    Cardiology consulted  Pt on UA/NSTEMI pathway  No ACS protocol for now; will only start if Trop bumps significantly.  Continue home ASA 81.  High intensity Statin: continue home rosuvastatin 20 as atorvastatin 80 due to formulary limitation  continue B-blocker Lopressor 12.5 as tolerated; hold if pt is bradycardic or develops symptoms concerning for active heart failure  TTE pending  Trend troponin (to peak or flat) and EKGs q6hr, or sooner if pt's sxs worsen  Lipid panel, TSH  NTG PRN for chest pain    11/21: positive stress test, NPO p MN for cath tomorrow    Stage 3 chronic kidney disease  Creatine stable for now. BMP reviewed- noted Estimated Creatinine Clearance: 33.8 mL/min (based on SCr of 1.3 mg/dL). according to latest data. Based on current GFR, CKD stage is stage 3 - GFR 30-59.  Monitor UOP and serial BMP and adjust therapy as needed. Renally dose meds. Avoid nephrotoxic medications and procedures.      VTE Risk Mitigation (From admission, onward)           Ordered     Place GI hose  Until  discontinued         11/20/24 0605     IP VTE HIGH RISK PATIENT  Once         11/19/24 1842     Place sequential compression device  Until discontinued         11/19/24 1842                    Discharge Planning   BETTIE:      Code Status: Full Code   Is the patient medically ready for discharge?:     Reason for patient still in hospital (select all that apply): Patient trending condition, Treatment, and Consult recommendations  Discharge Plan A: Home with family (with instructions to follow up)      Jose Roberto Zhu Jr., APRN, AGACNP-BC  Hospitalist - Department of Hospital Medicine  [facility] [address] [place] [address]  Office #: [phone]; Pager #: [phone]

## 2024-11-21 NOTE — SUBJECTIVE & OBJECTIVE
Review of Systems   Cardiovascular:  Negative for chest pain, claudication, dyspnea on exertion, irregular heartbeat, leg swelling, near-syncope, orthopnea, palpitations, paroxysmal nocturnal dyspnea and syncope.   Respiratory:  Negative for cough, shortness of breath, snoring and sputum production.    Gastrointestinal:  Negative for abdominal pain, dysphagia, heartburn, nausea and vomiting.   Neurological:  Negative for dizziness, headaches, loss of balance and weakness.     Objective:     Vital Signs (Most Recent):  Temp: 98 °F (36.7 °C) (11/21/24 1111)  Pulse: (!) 49 (11/21/24 1111)  Resp: 18 (11/21/24 1111)  BP: (!) 113/53 (11/21/24 1111)  SpO2: 100 % (11/21/24 1111) Vital Signs (24h Range):  Temp:  [97.6 °F (36.4 °C)-98.4 °F (36.9 °C)] 98 °F (36.7 °C)  Pulse:  [45-73] 49  Resp:  [18] 18  SpO2:  [96 %-100 %] 100 %  BP: (102-130)/(46-60) 113/53     Weight: 88.5 kg (195 lb)  Body mass index is 33.47 kg/m².     SpO2: 100 %       No intake or output data in the 24 hours ending 11/21/24 1306    Lines/Drains/Airways       Peripheral Intravenous Line  Duration                  Peripheral IV - Single Lumen 11/19/24 1545 18 G Anterior;Proximal;Right Forearm 1 day                       Physical Exam  HENT:      Head: Normocephalic and atraumatic.      Mouth/Throat:      Mouth: Mucous membranes are moist.   Eyes:      Extraocular Movements: Extraocular movements intact.      Pupils: Pupils are equal, round, and reactive to light.   Cardiovascular:      Rate and Rhythm: Normal rate and regular rhythm.      Pulses: Normal pulses.      Heart sounds: Normal heart sounds.   Pulmonary:      Effort: Pulmonary effort is normal.      Breath sounds: Normal breath sounds.   Abdominal:      General: Bowel sounds are normal.      Palpations: Abdomen is soft.   Musculoskeletal:      Left lower leg: No edema.   Skin:     General: Skin is warm.   Neurological:      General: No focal deficit present.      Mental Status: She is alert.    Psychiatric:         Mood and Affect: Mood normal.         Behavior: Behavior normal.            Current Medications:   aspirin  243 mg Oral Once    aspirin  81 mg Oral Daily    atorvastatin  80 mg Oral Daily    gabapentin  300 mg Oral TID    levothyroxine  100 mcg Oral Daily    lisinopriL  20 mg Oral Daily    metoprolol tartrate  12.5 mg Oral BID    sertraline  100 mg Oral Daily         Current Facility-Administered Medications:     albuterol sulfate, 5 mg, Nebulization, Q6H PRN    diphenhydrAMINE, 25 mg, Oral, Q6H PRN    meclizine, 25 mg, Oral, TID PRN    nitroGLYCERIN, 0.4 mg, Sublingual, Q5 Min PRN    ondansetron, 4 mg, Intravenous, Q6H PRN    polyethylene glycol, 17 g, Oral, BID PRN    Laboratory (all labs reviewed):  CBC:  Recent Labs   Lab 06/06/23  1739 05/20/24 2055 11/19/24 1657 11/20/24  0613   WBC 6.35 6.35 7.77 6.69   Hemoglobin 13.5 11.8 L 11.7 L 11.5 L   Hematocrit 42.3 36.8 L 35.8 L 35.9 L   Platelets 197 131 L 162 148 L       CHEMISTRIES:  Recent Labs   Lab 07/17/23  0920 11/27/23  1142 05/20/24 2055 11/19/24  1657 11/20/24  0613   Glucose 103 H 115 113 H 87 92   Sodium 140 141 138 139 146 H   Potassium 4.6 4.7 4.2 3.6 3.9   BUN  --   --  30 H 18 22   Blood Urea Nitrogen 21 23  --   --   --    Creatinine 1.47 H 1.34 H 1.8 H 1.3 1.4   eGFR 35 L 39 L 27 A 40 A 37 A   Calcium 10.1 9.8 9.9 9.6 9.6       CARDIAC BIOMARKERS:  Recent Labs   Lab 11/19/24  1657 11/19/24  1816 11/19/24 2047 11/20/24  0055 11/20/24  0447   Troponin I <0.006 0.008 0.011 0.008 <0.006       COAGS:  Recent Labs   Lab 11/19/24 2047   INR 1.0       LIPIDS/LFTS:  Recent Labs   Lab 07/17/23  0920 11/27/23  1142 04/15/24  1618 05/20/24 2055 08/16/24  0903 11/19/24  1657 11/19/24 2047 11/20/24  0613   Cholesterol  --   --  118  --  151  --  126  --    Triglycerides  --   --  67  --  80  --  53  --    HDL  --   --  60 H  --  66 H  --  58  --    LDL Cholesterol  --   --   --   --   --   --  57.4 L  --    LDL Calculated  --   --   45  --  69  --   --   --    Non-HDL Cholesterol  --   --  58  --  85  --  68  --    AST 15 14  --  18  --  18  --  17   ALT 11 14  --  13  --  13  --  12       BNP:  Recent Labs   Lab 05/20/24  2055 11/19/24  1657   BNP 90 175 H       TSH:  Recent Labs   Lab 11/20/24  0613   TSH 0.821       Free T4:

## 2024-11-21 NOTE — PLAN OF CARE
Inpatient Upgrade Note    Tyra Soliz has warranted treatment spanning two or more midnights of hospital level care for the management of  Chest pain, positive stress test . She continues to require further testing/imaging, monitoring of vital signs, and further evaluation by consultants. Her condition is also complicated by the following comorbidities: Coronary Artery Disease, Hypertension, and Stents, HLD .    Jose Roberto Zhu Jr., APRN, AGACNP-BC  Hospitalist - Department of Hospital Medicine  [facility] [address] [place] [address]  Office #: [phone]; Pager #: [phone]

## 2024-11-21 NOTE — PLAN OF CARE
Problem: Adult Inpatient Plan of Care  Goal: Plan of Care Review  Outcome: Progressing     Problem: Acute Coronary Syndrome  Goal: Optimal Adaptation to Illness  Outcome: Progressing  Goal: Absence of Cardiac-Related Pain  Outcome: Progressing  Goal: Normalized Cardiac Rhythm  Outcome: Progressing     Problem: Chest Pain  Goal: Resolution of Chest Pain Symptoms  Outcome: Progressing

## 2024-11-21 NOTE — PROGRESS NOTES
[facility]  Cardiology  Progress Note    Patient Name: Tyra Soliz  MRN: [id number]  Admission Date: 11/19/2024  Hospital Length of Stay: 0 days  Code Status: Full Code   Attending Physician: Jh Murphy MD   Primary Care Physician: Marii Trejo MD  Expected Discharge Date:   Principal Problem:Chest pain    Subjective:     Hospital Course:   Patient was seen and examined this morning.  Underwent nuclear stress test this morning.  Remained hemodynamically stable.  No further chest pain recurrence.      Review of Systems   Cardiovascular:  Negative for chest pain, claudication, dyspnea on exertion, irregular heartbeat, leg swelling, near-syncope, orthopnea, palpitations, paroxysmal nocturnal dyspnea and syncope.   Respiratory:  Negative for cough, shortness of breath, snoring and sputum production.    Gastrointestinal:  Negative for abdominal pain, dysphagia, heartburn, nausea and vomiting.   Neurological:  Negative for dizziness, headaches, loss of balance and weakness.     Objective:     Vital Signs (Most Recent):  Temp: 98 °F (36.7 °C) (11/21/24 1111)  Pulse: (!) 49 (11/21/24 1111)  Resp: 18 (11/21/24 1111)  BP: (!) 113/53 (11/21/24 1111)  SpO2: 100 % (11/21/24 1111) Vital Signs (24h Range):  Temp:  [97.6 °F (36.4 °C)-98.4 °F (36.9 °C)] 98 °F (36.7 °C)  Pulse:  [45-73] 49  Resp:  [18] 18  SpO2:  [96 %-100 %] 100 %  BP: (102-130)/(46-60) 113/53     Weight: 88.5 kg (195 lb)  Body mass index is 33.47 kg/m².     SpO2: 100 %       No intake or output data in the 24 hours ending 11/21/24 1306    Lines/Drains/Airways       Peripheral Intravenous Line  Duration                  Peripheral IV - Single Lumen 11/19/24 1545 18 G Anterior;Proximal;Right Forearm 1 day                       Physical Exam  HENT:      Head: Normocephalic and atraumatic.      Mouth/Throat:      Mouth: Mucous membranes are moist.   Eyes:      Extraocular Movements: Extraocular movements intact.      Pupils: Pupils are  equal, round, and reactive to light.   Cardiovascular:      Rate and Rhythm: Normal rate and regular rhythm.      Pulses: Normal pulses.      Heart sounds: Normal heart sounds.   Pulmonary:      Effort: Pulmonary effort is normal.      Breath sounds: Normal breath sounds.   Abdominal:      General: Bowel sounds are normal.      Palpations: Abdomen is soft.   Musculoskeletal:      Left lower leg: No edema.   Skin:     General: Skin is warm.   Neurological:      General: No focal deficit present.      Mental Status: She is alert.   Psychiatric:         Mood and Affect: Mood normal.         Behavior: Behavior normal.            Current Medications:   aspirin  243 mg Oral Once    aspirin  81 mg Oral Daily    atorvastatin  80 mg Oral Daily    gabapentin  300 mg Oral TID    levothyroxine  100 mcg Oral Daily    lisinopriL  20 mg Oral Daily    metoprolol tartrate  12.5 mg Oral BID    sertraline  100 mg Oral Daily         Current Facility-Administered Medications:     albuterol sulfate, 5 mg, Nebulization, Q6H PRN    diphenhydrAMINE, 25 mg, Oral, Q6H PRN    meclizine, 25 mg, Oral, TID PRN    nitroGLYCERIN, 0.4 mg, Sublingual, Q5 Min PRN    ondansetron, 4 mg, Intravenous, Q6H PRN    polyethylene glycol, 17 g, Oral, BID PRN    Laboratory (all labs reviewed):  CBC:  Recent Labs   Lab 06/06/23  1739 05/20/24 2055 11/19/24 1657 11/20/24  0613   WBC 6.35 6.35 7.77 6.69   Hemoglobin 13.5 11.8 L 11.7 L 11.5 L   Hematocrit 42.3 36.8 L 35.8 L 35.9 L   Platelets 197 131 L 162 148 L       CHEMISTRIES:  Recent Labs   Lab 07/17/23  0920 11/27/23  1142 05/20/24 2055 11/19/24  1657 11/20/24  0613   Glucose 103 H 115 113 H 87 92   Sodium 140 141 138 139 146 H   Potassium 4.6 4.7 4.2 3.6 3.9   BUN  --   --  30 H 18 22   Blood Urea Nitrogen 21 23  --   --   --    Creatinine 1.47 H 1.34 H 1.8 H 1.3 1.4   eGFR 35 L 39 L 27 A 40 A 37 A   Calcium 10.1 9.8 9.9 9.6 9.6       CARDIAC BIOMARKERS:  Recent Labs   Lab 11/19/24 1657 11/19/24  4254  11/19/24 2047 11/20/24  0055 11/20/24  0447   Troponin I <0.006 0.008 0.011 0.008 <0.006       COAGS:  Recent Labs   Lab 11/19/24 2047   INR 1.0       LIPIDS/LFTS:  Recent Labs   Lab 07/17/23  0920 11/27/23  1142 04/15/24  1618 05/20/24 2055 08/16/24  0903 11/19/24  1657 11/19/24 2047 11/20/24 0613   Cholesterol  --   --  118  --  151  --  126  --    Triglycerides  --   --  67  --  80  --  53  --    HDL  --   --  60 H  --  66 H  --  58  --    LDL Cholesterol  --   --   --   --   --   --  57.4 L  --    LDL Calculated  --   --  45  --  69  --   --   --    Non-HDL Cholesterol  --   --  58  --  85  --  68  --    AST 15 14  --  18  --  18  --  17   ALT 11 14  --  13  --  13  --  12       BNP:  Recent Labs   Lab 05/20/24 2055 11/19/24 1657   BNP 90 175 H       TSH:  Recent Labs   Lab 11/20/24 0613   TSH 0.821       Free T4:          Assessment and Plan:       * Chest pain  Stress test this morning was abnormal.  It showed a mild to moderate intensity, small to medium sized, reversible perfusion abnormality that is consistent with ischemia in the basal to mid inferior walls.  Discussed the need for coronary angiography.  She follows up with Dr. Yadav and he is starting the service from tomorrow.   Please make her NPO past midnight for coronary angiography tomorrow  Continue with statin.  We can give her full-dose aspirin (325 mg) today.    Essential hypertension  Continue Lopressor and lisinopril        VTE Risk Mitigation (From admission, onward)           Ordered     Place GI hose  Until discontinued         11/20/24 0605     IP VTE HIGH RISK PATIENT  Once         11/19/24 1842     Place sequential compression device  Until discontinued         11/19/24 1842                    Dana Young MD  Cardiology  [facility] - Telemetry

## 2024-11-21 NOTE — CARE UPDATE
Per nurse, pt current BP is 113/51 HR 51. pt is scheduled to get metoprolol 12.5mg tonight. Pt BP meds dont have hold parameters     -Parameters added to hold BB if P< 60 or SBP < 100

## 2024-11-21 NOTE — HOSPITAL COURSE
Mrs. Soliz was hospitalized for ACS rule out after presenting with chest pain. Troponin remained negative, EKG without ST elevation; however, NST was positive for myocardial ischemia.  Heart cath 11/22/24.    Description of the findings of the procedure: uneventful LHC/cor angio via RFA.  Patent prox LAD stent with nonobst CAD.  RFA man comp.     Findings/Key Components:  LVEDP: 10mmHg  LVEF: 60% by echo     Dominance: Right  LM: normal  LAD: patent prox stent (7/25/19 2.75x26 Resolute [place] ROBERTO)  LCx: MLI  RCA: dom, prox 30-40%     Hemostasis:  RFA man comp (R radial occluded on US)     Impression:  CP with abnl MPI, ?UAP  1V CAD, normal LV fxn  Widely patent prox LAD stent  RFA man comp for hemostasis     Plan:  Cont med rx  Cont ASA/statin  Stop Plavix  Home today  Follow up with Dr. Yadav    All findings and plan discussed with patient, all questions answered, she verbalizes understanding and agreement.  Discharged home in stable condition.

## 2024-11-22 VITALS
SYSTOLIC BLOOD PRESSURE: 129 MMHG | DIASTOLIC BLOOD PRESSURE: 58 MMHG | RESPIRATION RATE: 19 BRPM | BODY MASS INDEX: 33.29 KG/M2 | TEMPERATURE: 98 F | HEIGHT: 64 IN | HEART RATE: 49 BPM | WEIGHT: 195 LBS | OXYGEN SATURATION: 98 %

## 2024-11-22 PROCEDURE — 99152 MOD SED SAME PHYS/QHP 5/>YRS: CPT | Performed by: INTERNAL MEDICINE

## 2024-11-22 PROCEDURE — 63600175 PHARM REV CODE 636 W HCPCS: Performed by: INTERNAL MEDICINE

## 2024-11-22 PROCEDURE — 99152 MOD SED SAME PHYS/QHP 5/>YRS: CPT | Mod: ,,, | Performed by: INTERNAL MEDICINE

## 2024-11-22 PROCEDURE — 25000003 PHARM REV CODE 250: Performed by: INTERNAL MEDICINE

## 2024-11-22 PROCEDURE — 93458 L HRT ARTERY/VENTRICLE ANGIO: CPT | Mod: 26,,, | Performed by: INTERNAL MEDICINE

## 2024-11-22 PROCEDURE — C1769 GUIDE WIRE: HCPCS | Performed by: INTERNAL MEDICINE

## 2024-11-22 PROCEDURE — 25000003 PHARM REV CODE 250

## 2024-11-22 PROCEDURE — 25000003 PHARM REV CODE 250: Performed by: NURSE PRACTITIONER

## 2024-11-22 PROCEDURE — 93458 L HRT ARTERY/VENTRICLE ANGIO: CPT | Performed by: INTERNAL MEDICINE

## 2024-11-22 PROCEDURE — C1894 INTRO/SHEATH, NON-LASER: HCPCS | Performed by: INTERNAL MEDICINE

## 2024-11-22 PROCEDURE — 4A023N7 MEASUREMENT OF CARDIAC SAMPLING AND PRESSURE, LEFT HEART, PERCUTANEOUS APPROACH: ICD-10-PCS | Performed by: INTERNAL MEDICINE

## 2024-11-22 PROCEDURE — C1887 CATHETER, GUIDING: HCPCS | Performed by: INTERNAL MEDICINE

## 2024-11-22 PROCEDURE — B2111ZZ FLUOROSCOPY OF MULTIPLE CORONARY ARTERIES USING LOW OSMOLAR CONTRAST: ICD-10-PCS | Performed by: INTERNAL MEDICINE

## 2024-11-22 PROCEDURE — 25500020 PHARM REV CODE 255: Performed by: INTERNAL MEDICINE

## 2024-11-22 RX ORDER — ACETAMINOPHEN 325 MG/1
650 TABLET ORAL EVERY 4 HOURS PRN
Status: DISCONTINUED | OUTPATIENT
Start: 2024-11-22 | End: 2024-11-22 | Stop reason: HOSPADM

## 2024-11-22 RX ORDER — ONDANSETRON 8 MG/1
8 TABLET, ORALLY DISINTEGRATING ORAL EVERY 8 HOURS PRN
Status: DISCONTINUED | OUTPATIENT
Start: 2024-11-22 | End: 2024-11-22 | Stop reason: HOSPADM

## 2024-11-22 RX ORDER — MIDAZOLAM HYDROCHLORIDE 1 MG/ML
INJECTION INTRAMUSCULAR; INTRAVENOUS
Status: DISCONTINUED | OUTPATIENT
Start: 2024-11-22 | End: 2024-11-22 | Stop reason: HOSPADM

## 2024-11-22 RX ORDER — LIDOCAINE HYDROCHLORIDE 10 MG/ML
INJECTION, SOLUTION EPIDURAL; INFILTRATION; INTRACAUDAL; PERINEURAL
Status: DISCONTINUED | OUTPATIENT
Start: 2024-11-22 | End: 2024-11-22 | Stop reason: HOSPADM

## 2024-11-22 RX ORDER — HEPARIN SODIUM 200 [USP'U]/100ML
INJECTION, SOLUTION INTRAVENOUS
Status: DISCONTINUED | OUTPATIENT
Start: 2024-11-22 | End: 2024-11-22

## 2024-11-22 RX ORDER — MORPHINE SULFATE 4 MG/ML
2 INJECTION, SOLUTION INTRAMUSCULAR; INTRAVENOUS EVERY 10 MIN PRN
Status: DISCONTINUED | OUTPATIENT
Start: 2024-11-22 | End: 2024-11-22 | Stop reason: HOSPADM

## 2024-11-22 RX ORDER — DIPHENHYDRAMINE HCL 25 MG
50 CAPSULE ORAL ONCE
Status: COMPLETED | OUTPATIENT
Start: 2024-11-22 | End: 2024-11-22

## 2024-11-22 RX ORDER — NITROGLYCERIN 0.4 MG/1
0.4 TABLET SUBLINGUAL
Status: DISCONTINUED | OUTPATIENT
Start: 2024-11-22 | End: 2024-11-22 | Stop reason: HOSPADM

## 2024-11-22 RX ORDER — FENTANYL CITRATE 50 UG/ML
INJECTION, SOLUTION INTRAMUSCULAR; INTRAVENOUS
Status: DISCONTINUED | OUTPATIENT
Start: 2024-11-22 | End: 2024-11-22 | Stop reason: HOSPADM

## 2024-11-22 RX ORDER — ATROPINE SULFATE 0.1 MG/ML
0.5 INJECTION INTRAVENOUS
Status: DISCONTINUED | OUTPATIENT
Start: 2024-11-22 | End: 2024-11-22 | Stop reason: HOSPADM

## 2024-11-22 RX ADMIN — SODIUM CHLORIDE 1800 ML: 9 INJECTION, SOLUTION INTRAVENOUS at 12:11

## 2024-11-22 RX ADMIN — CLOPIDOGREL BISULFATE 75 MG: 75 TABLET ORAL at 08:11

## 2024-11-22 RX ADMIN — METOPROLOL TARTRATE 12.5 MG: 25 TABLET, FILM COATED ORAL at 08:11

## 2024-11-22 RX ADMIN — DIPHENHYDRAMINE HYDROCHLORIDE 50 MG: 25 CAPSULE ORAL at 08:11

## 2024-11-22 RX ADMIN — GABAPENTIN 300 MG: 300 CAPSULE ORAL at 08:11

## 2024-11-22 RX ADMIN — GABAPENTIN 300 MG: 300 CAPSULE ORAL at 04:11

## 2024-11-22 RX ADMIN — LEVOTHYROXINE SODIUM 100 MCG: 0.1 TABLET ORAL at 08:11

## 2024-11-22 RX ADMIN — SODIUM CHLORIDE: 0.9 INJECTION, SOLUTION INTRAVENOUS at 06:11

## 2024-11-22 RX ADMIN — ATORVASTATIN CALCIUM 80 MG: 40 TABLET, FILM COATED ORAL at 08:11

## 2024-11-22 RX ADMIN — SODIUM CHLORIDE: 0.9 INJECTION, SOLUTION INTRAVENOUS at 02:11

## 2024-11-22 RX ADMIN — SERTRALINE HYDROCHLORIDE 100 MG: 50 TABLET ORAL at 08:11

## 2024-11-22 RX ADMIN — LISINOPRIL 20 MG: 20 TABLET ORAL at 08:11

## 2024-11-22 RX ADMIN — ASPIRIN 81 MG: 81 TABLET, COATED ORAL at 08:11

## 2024-11-22 NOTE — DISCHARGE SUMMARY
"[facility] Medicine  Discharge Summary      Patient Name: Tyra Soliz  MRN: [id number]  TATIANA: [id number]  Patient Class: IP- Inpatient  Admission Date: 11/19/2024  Hospital Length of Stay: 1 days  Discharge Date and Time:  11/22/2024 4:26 PM  Attending Physician: Jh Murphy MD   Discharging Provider: Jose Roberto Zhu Jr, NP  Primary Care Provider: Marii Trejo MD    Primary Care Team: JOSE ROBERTO ZHU    HPI:   Tyra Soliz is a 84 y.o. female with  BMI 42, CAD s/p ROBERTO to LAD in 2019, HTN, and HLD who presented to [facility] ED on 11/19/2024 for eval and treatment of chest pain.  They describe their pain as tight heaviness, 10/10, located beneath the sternum with radiation down the L arm.  She's been having intermittent mild discomfort for the past few weeks that has been relieved by nitro, but on the day of presentation around 14:30 her sxs came back as above so she called EMS.  She describes today's sxs as "exactly like my previous heart attack."  She reports she was sitting at sx onset, but crying because of the death of her dog the night before.  She was given 3x nitro and 3x baby ASA by EMS en route, and says her chest discomfort as of initial  interview is 3/10.    Most recent LHC in Sept. 2020 by Dr. Yadav, findings: Dominance: Right.  LM: normal.  LAD: patent prox stent (7/25/19 2.75x26 Resolute [place] ROBERTO).  LCx: normal.  RCA: dom, prox 40-50%, unchanged vs angio 10/15/19.    ED course notable for the following: Pt in NAD.  Hypertensive and HR in 50s but other VS unremarkable.  Exam without JVD, murmurs, rales, or REBECCA.  Labs Trop 0.006 -> 0.011  (b/l 90).  BUN 18 Crt 1.3 eGFR 40 (all at her recent b/l)a.  EKG SR w PVCs but no obvious ST abnormalities.  Imaging: CXR clear.  ED therapy/stabilization measures: Nitro.  They were placed in observation under the care of [facility] for further evaluation and treatment.     Procedure(s) " (LRB):  Angiogram, Coronary, with Left Heart Cath (Left)      Hospital Course:   Mrs. Soliz was hospitalized for ACS rule out after presenting with chest pain. Troponin remained negative, EKG without ST elevation; however, NST was positive for myocardial ischemia.  Heart cath 11/22/24.    Description of the findings of the procedure: uneventful LHC/cor angio via RFA.  Patent prox LAD stent with nonobst CAD.  RFA man comp.     Findings/Key Components:  LVEDP: 10mmHg  LVEF: 60% by echo     Dominance: Right  LM: normal  LAD: patent prox stent (7/25/19 2.75x26 Resolute [place] ROBERTO)  LCx: MLI  RCA: dom, prox 30-40%     Hemostasis:  RFA man comp (R radial occluded on US)     Impression:  CP with abnl MPI, ?UAP  1V CAD, normal LV fxn  Widely patent prox LAD stent  RFA man comp for hemostasis     Plan:  Cont med rx  Cont ASA/statin  Stop Plavix  Home today  Follow up with Dr. Yadav    All findings and plan discussed with patient, all questions answered, she verbalizes understanding and agreement.  Discharged home in stable condition.      Goals of Care Treatment Preferences:  Code Status: Full Code    Consults:   Consults (From admission, onward)          Status Ordering Provider     Inpatient consult to Registered Dietitian/Nutritionist  Once        Provider:  (Not yet assigned)    Completed AKI SHANNON     Inpatient consult to Social Work/Case Management  Once        Provider:  (Not yet assigned)    Completed AKI SHANNON     Inpatient consult to Cardiology  Once        Provider:  Dana Young MD    Completed AKI SHANNON            No new Assessment & Plan notes have been filed under this hospital service since the last note was generated.  Service: Hospital Medicine    Final Active Diagnoses:    Diagnosis Date Noted POA    PRINCIPAL PROBLEM:  Chest pain [R07.9] 07/24/2019 Yes    Mixed hyperlipidemia [E78.2] 10/08/2019 Yes    Coronary artery disease involving native coronary artery of native heart  with unstable angina pectoris [I25.110] 08/26/2019 Yes    History of non-ST elevation myocardial infarction (NSTEMI) [I25.2] 07/24/2019 Not Applicable    Essential hypertension [I10] 07/24/2019 Yes    Stage 3 chronic kidney disease [N18.30] 03/20/2017 Yes      Problems Resolved During this Admission:       Discharged Condition: stable    Disposition: Home or Self Care    Follow Up:   Follow-up Information       Marii Trejo MD Follow up on 11/26/2024.    Specialty: Internal Medicine  Why: Appointment scheduled for 11am  Contact information:  [address]  [facility] [address]  [phone]                           Patient Instructions:      REASON FOR NOT PRESCRIBING ANTIPLATELET MEDICATION AT DISCHARGE     Order Specific Question Answer Comments   Reason for not Prescribing: Medical Contraindication        Significant Diagnostic Studies: Labs: Troponin   Recent Labs   Lab 11/19/24  2047 11/20/24  0055 11/20/24  0447   TROPONINI 0.011 0.008 <0.006       Pending Diagnostic Studies:       Procedure Component Value Units Date/Time    EKG 12-lead [[id number]]     Order Status: Sent Lab Status: No result     EKG 12-lead [[id number]]     Order Status: Sent Lab Status: No result            Medications:  Reconciled Home Medications:      Medication List        CONTINUE taking these medications      albuterol 90 mcg/actuation inhaler  Commonly known as: PROVENTIL/VENTOLIN HFA  Inhale 1-2 puffs into the lungs every 6 (six) hours as needed for Wheezing or Shortness of Breath. Rescue     aspirin 81 MG EC tablet  Commonly known as: ECOTRIN  Take 1 tablet (81 mg total) by mouth once daily.     atorvastatin 80 MG tablet  Commonly known as: LIPITOR  Take 1 tablet (80 mg total) by mouth once daily.     diphenhydrAMINE 25 mg capsule  Commonly known as: BENADRYL  Take 1 each (25 mg total) by mouth every 6 (six) hours as needed for Itching.     furosemide 20 MG tablet  Commonly known as: LASIX  Take 20 mg by  mouth 2 (two) times daily.     gabapentin 300 MG capsule  Commonly known as: NEURONTIN  Take 1 capsule (300 mg total) by mouth 3 (three) times daily.     HYDROcodone-acetaminophen 5-325 mg per tablet  Commonly known as: NORCO  Take 1 tablet by mouth every 6 (six) hours as needed for Pain.     levothyroxine 100 MCG tablet  Commonly known as: SYNTHROID  Take 100 mcg by mouth once daily.     lisinopriL 5 MG tablet  Commonly known as: PRINIVIL,ZESTRIL  Take 5 mg by mouth once daily.     meclizine 25 mg tablet  Commonly known as: ANTIVERT  Take 1 tablet (25 mg total) by mouth 3 (three) times daily as needed for Dizziness.     metoprolol tartrate 25 MG tablet  Commonly known as: LOPRESSOR  Take 0.5 tablets (12.5 mg total) by mouth 2 (two) times daily.     nitroGLYCERIN 0.4 MG SL tablet  Commonly known as: NITROSTAT  Place 1 tablet (0.4 mg total) under the tongue every 5 (five) minutes as needed for Chest pain.     sertraline 100 MG tablet  Commonly known as: ZOLOFT  Take 100 mg by mouth once daily.     valACYclovir 1000 MG tablet  Commonly known as: VALTREX  Take 1 tablet (1,000 mg total) by mouth 3 (three) times daily. for 7 days     VITAMIN D2 50,000 unit Cap  Generic drug: ergocalciferol  Take 50,000 Units by mouth every 7 days. Taking on Monday's              Indwelling Lines/Drains at time of discharge:   Lines/Drains/Airways       None                   Time spent on the discharge of patient: 35 minutes         Jose Roberto Zhu Jr NP  Department of Hospital Medicine  [facility]

## 2024-11-22 NOTE — PLAN OF CARE
Problem: Adult Inpatient Plan of Care  Goal: Plan of Care Review  Outcome: Adequate for Care Transition  Goal: Patient-Specific Goal (Individualized)  Outcome: Adequate for Care Transition  Goal: Absence of Hospital-Acquired Illness or Injury  Outcome: Adequate for Care Transition  Goal: Optimal Comfort and Wellbeing  Outcome: Adequate for Care Transition  Goal: Readiness for Transition of Care  Outcome: Adequate for Care Transition     Problem: Bariatric Environmental Safety  Goal: Safety Maintained with Care  Outcome: Adequate for Care Transition     Problem: Acute Coronary Syndrome  Goal: Optimal Adaptation to Illness  Outcome: Adequate for Care Transition  Goal: Absence of Cardiac-Related Pain  Outcome: Adequate for Care Transition  Goal: Normalized Cardiac Rhythm  Outcome: Adequate for Care Transition  Goal: Effective Cardiac Pump Function  Outcome: Adequate for Care Transition  Goal: Adequate Tissue Perfusion  Outcome: Adequate for Care Transition     Problem: Cardiac Catheterization (Diagnostic/Interventional)  Goal: Absence of Bleeding  Outcome: Adequate for Care Transition  Goal: Absence of Contrast-Induced Injury  Outcome: Adequate for Care Transition  Goal: Stable Heart Rate and Rhythm  Outcome: Adequate for Care Transition  Goal: Absence of Embolism Signs and Symptoms  Outcome: Adequate for Care Transition  Goal: Anesthesia/Sedation Recovery  Outcome: Adequate for Care Transition  Goal: Optimal Pain Control and Function  Outcome: Adequate for Care Transition  Goal: Absence of Vascular Access Complication  Outcome: Adequate for Care Transition     Problem: Sepsis/Septic Shock  Goal: Optimal Coping  Outcome: Adequate for Care Transition  Goal: Absence of Bleeding  Outcome: Adequate for Care Transition  Goal: Blood Glucose Level Within Targeted Range  Outcome: Adequate for Care Transition  Goal: Absence of Infection Signs and Symptoms  Outcome: Adequate for Care Transition  Goal: Optimal Nutrition  Intake  Outcome: Adequate for Care Transition     Problem: Pneumonia  Goal: Fluid Balance  Outcome: Adequate for Care Transition  Goal: Resolution of Infection Signs and Symptoms  Outcome: Adequate for Care Transition  Goal: Effective Oxygenation and Ventilation  Outcome: Adequate for Care Transition     Problem: Fall Injury Risk  Goal: Absence of Fall and Fall-Related Injury  Outcome: Adequate for Care Transition     Problem: Skin Injury Risk Increased  Goal: Skin Health and Integrity  Outcome: Adequate for Care Transition     Problem: Chest Pain  Goal: Resolution of Chest Pain Symptoms  Outcome: Adequate for Care Transition     Problem: Wound  Goal: Optimal Coping  Outcome: Adequate for Care Transition  Goal: Optimal Functional Ability  Outcome: Adequate for Care Transition  Goal: Absence of Infection Signs and Symptoms  Outcome: Adequate for Care Transition  Goal: Improved Oral Intake  Outcome: Adequate for Care Transition  Goal: Optimal Pain Control and Function  Outcome: Adequate for Care Transition  Goal: Skin Health and Integrity  Outcome: Adequate for Care Transition  Goal: Optimal Wound Healing  Outcome: Adequate for Care Transition

## 2024-11-22 NOTE — DISCHARGE INSTRUCTIONS
Our goal at [facility] is to always give you outstanding care and exceptional service. You may receive a survey by mail, text or e-mail in the next 7-10 days from Ryan Martin and our leadership team asking about the care you received with us. The survey should only take 5-10 minutes to complete and is very important to us.     Your feedback provides us with a way to recognize our staff who work tirelessly to provide the best care! Also, your responses help us learn how to improve when your experience was below our aspiration of excellence. We WILL use your feedback to continue making improvements to help us provide the highest quality care. We keep your personal information and feedback confidential. We appreciate your time completing this survey and can't wait to hear from you!!!     We want you to leave us today feeling like you can DEFINITELY recommend us to others! We look forward to your continued care with us! Thanks so much for choosing [facility] for your healthcare needs!

## 2024-11-22 NOTE — PLAN OF CARE
Case Management Assessment     PCP: Marii Trejo MD    Pharmacy:   [id number] - [place] - [address]  [address]  [place] [address]  Phone: [phone] Fax: [phone]        Patient Arrived From: Home  Existing Help at Home: Pt's son and grandson    Barriers to Discharge: None    Discharge Plan:    A. Home with family   B. Home with family    Spoke with patient via telephone.  She stated she lives with her son and grandson, who assist her at home when needed; she is independent with ADLs and uses a rolling walker at home.  Patient stated one of her grandchildren will provide transportation home upon discharge. CM to continue to assess for and until discharge   11/22/24 3757   Discharge Assessment   Assessment Type Discharge Planning Assessment   Confirmed/corrected address, phone number and insurance Yes   Confirmed Demographics Correct on Facesheet   Source of Information patient   Does patient/caregiver understand observation status Yes   Communicated BETTIE with patient/caregiver Yes   Reason For Admission Chest pain   Facility Arrived From: Home   Do you expect to return to your current living situation? Yes   Do you have help at home or someone to help you manage your care at home? Yes   Who are your caregiver(s) and their phone number(s)? Adult children   Prior to hospitilization cognitive status: Unable to Assess   Equipment Currently Used at Home walker, rolling   Readmission within 30 days? No   Patient currently being followed by outpatient case management? No   Do you currently have service(s) that help you manage your care at home? No   Do you take prescription medications? Yes   Do you have prescription coverage? Yes   [facility]   Do you have any problems affording any of your prescribed medications? No   Is the patient taking medications as prescribed? yes   Who is going to help you get home at discharge?  Family   How do you get to doctors appointments? family or friend will provide   Are you on dialysis? No   Do you take coumadin? No   Discharge Plan A Home with family   DME Needed Upon Discharge  none   Discharge Plan discussed with: Patient   Transition of Care Barriers None

## 2024-11-22 NOTE — SUBJECTIVE & OBJECTIVE
Interval History: no new complaints, currently without chest pain    Review of Systems   Respiratory:  Negative for shortness of breath.    Cardiovascular:  Negative for chest pain.   Gastrointestinal:  Negative for nausea and vomiting.   All other systems reviewed and are negative.    Objective:     Vital Signs (Most Recent):  Temp: 97.9 °F (36.6 °C) (11/22/24 0714)  Pulse: 63 (11/22/24 0808)  Resp: 19 (11/22/24 0714)  BP: (!) 121/58 (11/22/24 0714)  SpO2: 98 % (11/22/24 0714) Vital Signs (24h Range):  Temp:  [97.3 °F (36.3 °C)-98.6 °F (37 °C)] 97.9 °F (36.6 °C)  Pulse:  [49-73] 63  Resp:  [18-19] 19  SpO2:  [94 %-100 %] 98 %  BP: (113-133)/(52-62) 121/58     Weight: 88.5 kg (195 lb)  Body mass index is 33.47 kg/m².    Intake/Output Summary (Last 24 hours) at 11/22/2024 1033  Last data filed at 11/21/2024 1807  Gross per 24 hour   Intake 240 ml   Output --   Net 240 ml         Physical Exam  Vitals and nursing note reviewed.   Constitutional:       General: She is not in acute distress.     Appearance: She is well-developed.   HENT:      Head: Normocephalic and atraumatic.      Right Ear: External ear normal.      Left Ear: External ear normal.   Cardiovascular:      Rate and Rhythm: Normal rate and regular rhythm.   Pulmonary:      Effort: Pulmonary effort is normal. No respiratory distress.   Skin:     General: Skin is warm and dry.   Neurological:      Mental Status: She is alert and oriented to person, place, and time.   Psychiatric:         Thought Content: Thought content normal.             Significant Labs: All pertinent labs within the past 24 hours have been reviewed.    Significant Imaging: I have reviewed all pertinent imaging results/findings within the past 24 hours.

## 2024-11-22 NOTE — PROGRESS NOTES
AVS virtually reviewed with Ms Soliz in its entirety with emphasis on diet, medications, follow-up appointments and reasons to return to the ED or contact the [facility] On Call Nurse Care Line. Patient also encouraged to utilize their patient portal. Ease and convenience of use reiterated. Education complete and patient voiced understanding. All questions answered. Discharge teaching complete.

## 2024-11-22 NOTE — ASSESSMENT & PLAN NOTE
Hx NSTEMI  CAD  HLD  HTN    Patient describes chest pain as similar to her 2019 NSTEMI for which she got a ROBERTO to her LAD: tightness across her chest.  Started at rest while she was crying due to death of her dog the night before.  Their known cardiac risk factors include female older than 55 years of age, hyperlipidemia, hypertension, obesity, and a history of coronary artery disease.  EKG (see above) without ST changes c/f ischemia  Most recent Troponins (starting w latest at top):  Lab Results   Component Value Date    TROPONINI <0.006 11/20/2024    TROPONINI 0.008 11/20/2024    TROPONINI 0.011 11/19/2024    TROPONINI 0.008 11/19/2024    TROPONINI <0.006 11/19/2024      Lab Results   Component Value Date    CREATININE 1.4 11/20/2024       USMAN 3     We are concerned their chest pain may be ischemic in nature per the above data.  However, its onset in setting of grief over the loss of her dog makes transient Takotsubo cardiomyopathy a differential as well.  Admitting them to the hospital for further eval and treatment:    Cardiology consulted  Pt on UA/NSTEMI pathway  No ACS protocol for now; will only start if Trop bumps significantly.  Continue home ASA 81.  High intensity Statin: continue home rosuvastatin 20 as atorvastatin 80 due to formulary limitation  continue B-blocker Lopressor 12.5 as tolerated; hold if pt is bradycardic or develops symptoms concerning for active heart failure  TTE pending  Trend troponin (to peak or flat) and EKGs q6hr, or sooner if pt's sxs worsen  Lipid panel, TSH  NTG PRN for chest pain    11/21: positive stress test, NPO p MN for cath tomorrow    11/22: cath today

## 2024-11-22 NOTE — NURSING
PER handoff received from HIEU Stout     Pt resting in bed quietly. NAD noted. No c/o pain.     Fall and safety precautions maintained. Bed alarm activated and audible.. Bed locked in lowest position, with side rails up x2. Call bell and personal items within reach

## 2024-11-22 NOTE — NURSING
NPO from midnight.2  IV access  22 G Left AC and left post hand.Clipping done on both groin and wrist.alert oriented and conscious.CHG bath given.clean gown provided.Handoff given to Day shift nurse HIEU Howard.

## 2024-11-22 NOTE — PLAN OF CARE
Case Management Final Discharge Note      Discharge Disposition: Home    New DME ordered / company name: None    Relevant SDOH / Transition of Care Barriers:  None    Primary Caretaker and contact information: Patient is mostly independent; Son    Scheduled followup appointment: Follow up appointments with Cardiology and PCP scheduled and added to patient AVS    Referrals placed: None    Transportation: Private vehicle/family taking patient home        Patient and family educated on discharge services and updated on DC plan.  Patient to discharge home; she is mostly independent but lives with her son and grandson who are able to provide assistance as needed.  Bedside RN notified, patient clear to discharge from Case Management Perspective.    11/22/24 1521   Final Note   Assessment Type Final Discharge Note   Anticipated Discharge Disposition Home   Hospital Resources/Appts/Education Provided Provided patient/caregiver with written discharge plan information;Appointments scheduled and added to AVS   Post-Acute Status   Post-Acute Authorization Other   Coverage [facility] CHOICES -   Other Status No Post-Acute Service Needs   Discharge Delays None known at this time

## 2024-11-22 NOTE — NURSING
Patient is in good spirits about procedure tomorrow. She had family to visit and say since she has been here she has been treated well and slept really good. Bed is in the lowest position , call bell within reach and all safety needs are being met.

## 2024-11-22 NOTE — NURSING
[facility]  Nurses Note -- 4 Eyes      11/22/2024       Skin assessed on: Q Shift      [x] No Pressure Injuries Present    [x]Prevention Measures Documented    [] Yes LDA  for Pressure Injury Previously documented     [] Yes New Pressure Injury Discovered   [] LDA for New Pressure Injury Added      Attending RN:  Anita Pro RN     Second RN:  HIEU Stout

## 2024-11-22 NOTE — PLAN OF CARE
11/22/24 1514   Medicare Message   Important Message from Medicare regarding Discharge Appeal Rights Given to patient/caregiver;Explained to patient/caregiver;Other (comments)  (Spoke with patient via telephone.  Explained IMM and she verbalized understanding)   Date IMM was signed 11/22/24   Time IMM was signed [id number]

## 2024-11-22 NOTE — PROGRESS NOTES
"[facility]  Progress Note    Patient Name: Tyra Soliz  MRN: [id number]  Patient Class: IP- Inpatient   Admission Date: 11/19/2024  Length of Stay: 1 days  Attending Physician: Jh Murphy MD  Primary Care Provider: Marii Trejo MD        Subjective:     Principal Problem:Chest pain        HPI:  Tyra Soliz is a 84 y.o. female with  BMI 42, CAD s/p ROBERTO to LAD in 2019, HTN, and HLD who presented to [facility] ED on 11/19/2024 for eval and treatment of chest pain.  They describe their pain as tight heaviness, 10/10, located beneath the sternum with radiation down the L arm.  She's been having intermittent mild discomfort for the past few weeks that has been relieved by nitro, but on the day of presentation around 14:30 her sxs came back as above so she called EMS.  She describes today's sxs as "exactly like my previous heart attack."  She reports she was sitting at sx onset, but crying because of the death of her dog the night before.  She was given 3x nitro and 3x baby ASA by EMS en route, and says her chest discomfort as of initial  interview is 3/10.    Most recent C in Sept. 2020 by Dr. Yadav, findings: Dominance: Right.  LM: normal.  LAD: patent prox stent (7/25/19 2.75x26 Resolute [place] ROBERTO).  LCx: normal.  RCA: dom, prox 40-50%, unchanged vs angio 10/15/19.    ED course notable for the following: Pt in NAD.  Hypertensive and HR in 50s but other VS unremarkable.  Exam without JVD, murmurs, rales, or REBECCA.  Labs Trop 0.006 -> 0.011  (b/l 90).  BUN 18 Crt 1.3 eGFR 40 (all at her recent b/l)a.  EKG SR w PVCs but no obvious ST abnormalities.  Imaging: CXR clear.  ED therapy/stabilization measures: Nitro.  They were placed in observation under the care of [facility] for further evaluation and treatment.     Overview/Hospital Course:  Mrs. Soliz was hospitalized for ACS rule out after presenting with chest pain. Troponin " remained negative, EKG without ST elevation; however, NST was positive for myocardial ischemia.  Heart cath 11/22/24.    Interval History: no new complaints, currently without chest pain    Review of Systems   Respiratory:  Negative for shortness of breath.    Cardiovascular:  Negative for chest pain.   Gastrointestinal:  Negative for nausea and vomiting.   All other systems reviewed and are negative.    Objective:     Vital Signs (Most Recent):  Temp: 97.9 °F (36.6 °C) (11/22/24 0714)  Pulse: 63 (11/22/24 0808)  Resp: 19 (11/22/24 0714)  BP: (!) 121/58 (11/22/24 0714)  SpO2: 98 % (11/22/24 0714) Vital Signs (24h Range):  Temp:  [97.3 °F (36.3 °C)-98.6 °F (37 °C)] 97.9 °F (36.6 °C)  Pulse:  [49-73] 63  Resp:  [18-19] 19  SpO2:  [94 %-100 %] 98 %  BP: (113-133)/(52-62) 121/58     Weight: 88.5 kg (195 lb)  Body mass index is 33.47 kg/m².    Intake/Output Summary (Last 24 hours) at 11/22/2024 1033  Last data filed at 11/21/2024 1807  Gross per 24 hour   Intake 240 ml   Output --   Net 240 ml         Physical Exam  Vitals and nursing note reviewed.   Constitutional:       General: She is not in acute distress.     Appearance: She is well-developed.   HENT:      Head: Normocephalic and atraumatic.      Right Ear: External ear normal.      Left Ear: External ear normal.   Cardiovascular:      Rate and Rhythm: Normal rate and regular rhythm.   Pulmonary:      Effort: Pulmonary effort is normal. No respiratory distress.   Skin:     General: Skin is warm and dry.   Neurological:      Mental Status: She is alert and oriented to person, place, and time.   Psychiatric:         Thought Content: Thought content normal.             Significant Labs: All pertinent labs within the past 24 hours have been reviewed.    Significant Imaging: I have reviewed all pertinent imaging results/findings within the past 24 hours.    Assessment/Plan:      * Chest pain  Hx NSTEMI  CAD  HLD  HTN    Patient describes chest pain as similar to her  2019 NSTEMI for which she got a ROBERTO to her LAD: tightness across her chest.  Started at rest while she was crying due to death of her dog the night before.  Their known cardiac risk factors include female older than 55 years of age, hyperlipidemia, hypertension, obesity, and a history of coronary artery disease.  EKG (see above) without ST changes c/f ischemia  Most recent Troponins (starting w latest at top):  Lab Results   Component Value Date    TROPONINI <0.006 11/20/2024    TROPONINI 0.008 11/20/2024    TROPONINI 0.011 11/19/2024    TROPONINI 0.008 11/19/2024    TROPONINI <0.006 11/19/2024      Lab Results   Component Value Date    CREATININE 1.4 11/20/2024       USMAN 3     We are concerned their chest pain may be ischemic in nature per the above data.  However, its onset in setting of grief over the loss of her dog makes transient Takotsubo cardiomyopathy a differential as well.  Admitting them to the hospital for further eval and treatment:    Cardiology consulted  Pt on UA/NSTEMI pathway  No ACS protocol for now; will only start if Trop bumps significantly.  Continue home ASA 81.  High intensity Statin: continue home rosuvastatin 20 as atorvastatin 80 due to formulary limitation  continue B-blocker Lopressor 12.5 as tolerated; hold if pt is bradycardic or develops symptoms concerning for active heart failure  TTE pending  Trend troponin (to peak or flat) and EKGs q6hr, or sooner if pt's sxs worsen  Lipid panel, TSH  NTG PRN for chest pain    11/21: positive stress test, NPO p MN for cath tomorrow    11/22: cath today    Stage 3 chronic kidney disease  Creatine stable for now. BMP reviewed- noted Estimated Creatinine Clearance: 33.8 mL/min (based on SCr of 1.3 mg/dL). according to latest data. Based on current GFR, CKD stage is stage 3 - GFR 30-59.  Monitor UOP and serial BMP and adjust therapy as needed. Renally dose meds. Avoid nephrotoxic medications and procedures.      VTE Risk Mitigation (From  admission, onward)           Ordered     Place GI hose  Until discontinued         11/20/24 0605     IP VTE HIGH RISK PATIENT  Once         11/19/24 1842     Place sequential compression device  Until discontinued         11/19/24 1842                    Discharge Planning   BETTIE:      Code Status: Full Code   Is the patient medically ready for discharge?:     Reason for patient still in hospital (select all that apply): Patient trending condition, Treatment, and Consult recommendations  Discharge Plan A: Home with family (with instructions to follow up)      Jose Roberto Zhu Jr., APRN, [facility]  Hospitalist - Department of Hospital Medicine  [facility] [address] [place] [address]  Office #: [phone]; Pager #: [phone]

## 2024-11-22 NOTE — BRIEF OP NOTE
[facility] - Cath Lab  Brief Operative Note     SUMMARY     Surgery Date: 11/22/2024     Surgeons and Role:     * Carlos Alberto Yadav MD - Primary    Assisting Surgeon: None    Pre-op Diagnosis:  Coronary artery disease involving native coronary artery of native heart with unstable angina pectoris [I25.110]  Abnormal nuclear stress test [R94.39]    Post-op Diagnosis:  Post-Op Diagnosis Codes:     * Coronary artery disease involving native coronary artery of native heart with unstable angina pectoris [I25.110]     * Abnormal nuclear stress test [R94.39]    Procedure(s) (LRB):  Angiogram, Coronary, with Left Heart Cath (Left)    Anesthesia: RN IV Sedation    Description of the findings of the procedure: uneventful LHC/cor angio via RFA.  Patent prox LAD stent with nonobst CAD.  RFA man comp.    Findings/Key Components:  LVEDP: 10mmHg  LVEF: 60% by echo    Dominance: Right  LM: normal  LAD: patent prox stent (7/25/19 2.75x26 Resolute [place] ROBERTO)  LCx: MLI  RCA: dom, prox 30-40%    Hemostasis:  RFA man comp (R radial occluded on US)    Impression:  CP with abnl MPI, ?UAP  1V CAD, normal LV fxn  Widely patent prox LAD stent  RFA man comp for hemostasis    Plan:  Cont med rx  Cont ASA/statin  Stop Plavix  Home today  Follow up with Dr. Yadav    Estimated Blood Loss: <50cc         Specimens: None

## 2024-11-22 NOTE — NURSING
[facility]  Nurses Note -- 4 Eyes      11/21/2024       Skin assessed on: Q Shift      [x] No Pressure Injuries Present    [x]Prevention Measures Documented    [] Yes LDA  for Pressure Injury Previously documented     [] Yes New Pressure Injury Discovered   [] LDA for New Pressure Injury Added      Attending RN:  Girish Mahoney RN     Second RN:  STELLA Dc

## 2025-01-07 ENCOUNTER — TELEPHONE (OUTPATIENT)
Dept: GASTROENTEROLOGY | Facility: CLINIC | Age: 85
End: 2025-01-07
Payer: MEDICARE

## 2025-01-07 ENCOUNTER — PATIENT MESSAGE (OUTPATIENT)
Dept: CARDIOLOGY | Facility: CLINIC | Age: 85
End: 2025-01-07

## 2025-01-07 ENCOUNTER — OFFICE VISIT (OUTPATIENT)
Dept: CARDIOLOGY | Facility: CLINIC | Age: 85
End: 2025-01-07
Payer: MEDICARE

## 2025-01-07 VITALS
HEIGHT: 64 IN | OXYGEN SATURATION: 97 % | SYSTOLIC BLOOD PRESSURE: 108 MMHG | BODY MASS INDEX: 33.07 KG/M2 | RESPIRATION RATE: 18 BRPM | WEIGHT: 193.69 LBS | DIASTOLIC BLOOD PRESSURE: 74 MMHG | HEART RATE: 88 BPM

## 2025-01-07 DIAGNOSIS — E66.9 NON MORBID OBESITY, UNSPECIFIED OBESITY TYPE: ICD-10-CM

## 2025-01-07 DIAGNOSIS — Z98.61 HISTORY OF PERCUTANEOUS CORONARY INTERVENTION: ICD-10-CM

## 2025-01-07 DIAGNOSIS — R06.09 DOE (DYSPNEA ON EXERTION): Primary | ICD-10-CM

## 2025-01-07 DIAGNOSIS — I25.10 CORONARY ARTERY DISEASE INVOLVING NATIVE CORONARY ARTERY OF NATIVE HEART WITHOUT ANGINA PECTORIS: ICD-10-CM

## 2025-01-07 DIAGNOSIS — R07.89 OTHER CHEST PAIN: ICD-10-CM

## 2025-01-07 PROCEDURE — G2211 COMPLEX E/M VISIT ADD ON: HCPCS | Mod: S$GLB,,, | Performed by: INTERNAL MEDICINE

## 2025-01-07 PROCEDURE — 3078F DIAST BP <80 MM HG: CPT | Mod: CPTII,S$GLB,, | Performed by: INTERNAL MEDICINE

## 2025-01-07 PROCEDURE — 3288F FALL RISK ASSESSMENT DOCD: CPT | Mod: CPTII,S$GLB,, | Performed by: INTERNAL MEDICINE

## 2025-01-07 PROCEDURE — 99214 OFFICE O/P EST MOD 30 MIN: CPT | Mod: S$GLB,,, | Performed by: INTERNAL MEDICINE

## 2025-01-07 PROCEDURE — 99999 PR PBB SHADOW E&M-EST. PATIENT-LVL V: CPT | Mod: PBBFAC,,, | Performed by: INTERNAL MEDICINE

## 2025-01-07 PROCEDURE — 1125F AMNT PAIN NOTED PAIN PRSNT: CPT | Mod: CPTII,S$GLB,, | Performed by: INTERNAL MEDICINE

## 2025-01-07 PROCEDURE — 1159F MED LIST DOCD IN RCRD: CPT | Mod: CPTII,S$GLB,, | Performed by: INTERNAL MEDICINE

## 2025-01-07 PROCEDURE — 1160F RVW MEDS BY RX/DR IN RCRD: CPT | Mod: CPTII,S$GLB,, | Performed by: INTERNAL MEDICINE

## 2025-01-07 PROCEDURE — 3074F SYST BP LT 130 MM HG: CPT | Mod: CPTII,S$GLB,, | Performed by: INTERNAL MEDICINE

## 2025-01-07 PROCEDURE — 1100F PTFALLS ASSESS-DOCD GE2>/YR: CPT | Mod: CPTII,S$GLB,, | Performed by: INTERNAL MEDICINE

## 2025-01-07 NOTE — PROGRESS NOTES
CARDIOVASCULAR PROGRESS NOTE    REASON FOR CONSULT:   Tyra Soliz is a 84 y.o. female who presents for follow up of CAD.    PCP: Nilesh  HISTORY OF PRESENT ILLNESS:   The patient returns for follow up of her hospitalization last November with chest pain.  Subsequent catheterization revealed patent LAD stent with nonobstructive CAD otherwise.  Her EF is normal.  She continues to describe dyspnea on exertion and chest discomfort.  There has been no palpitations or syncope.  She denies PND, orthopnea, melena, hematuria, or claudication symptoms.  She reports no complications related to her right femoral access for her catheterization back in November 2024.    CARDIOVASCULAR HISTORY:   CAD 7/25/19 NSTEMI PCI prox LAD 2.75x26 Resolute [place] ROBERTO    Hx ASA allergy s/p desensitization    PAST MEDICAL HISTORY:     Past Medical History:   Diagnosis Date    Arthritis     Chronic kidney disease     stage 3     Coronary artery disease     Hypertension     Obesity (BMI 30-39.9)     Thyroid disease        PAST SURGICAL HISTORY:     Past Surgical History:   Procedure Laterality Date    ABDOMINAL SURGERY      removal scar tissue abdomen    ANGIOGRAM, CORONARY, WITH LEFT HEART CATHETERIZATION Left 11/22/2024    Procedure: Angiogram, Coronary, with Left Heart Cath;  Surgeon: Carlos Alberto Yadav MD;  Location: [facility] CATH LAB;  Service: Cardiology;  Laterality: Left;  R rad access    BACK SURGERY      x3    CHOLECYSTECTOMY      CORONARY ANGIOPLASTY  07/25/2019    prox LAD 2.75x26 Resolute [place] ROBERTO    HYSTERECTOMY      JOINT REPLACEMENT      Rt total knee    LEFT HEART CATHETERIZATION Left 7/25/2019    Procedure: Left heart cath R rad access;  Surgeon: Carlos Alberto Yadav MD;  Location: [facility] CATH LAB;  Service: Cardiology;  Laterality: Left;    LEFT HEART CATHETERIZATION Left 10/15/2019    Procedure: Left heart cath 10am start, R rad access;  Surgeon: Carlos Alberto Yadav MD;  Location: [facility] CATH LAB;  Service: Cardiology;   Laterality: Left;  RN PREOP 10/11/2019    LEFT HEART CATHETERIZATION Left 9/23/2020    Procedure: Left heart cath 12pm start, R rad access;  Surgeon: Carlos Alberto Yadav MD;  Location: [facility] CATH LAB;  Service: Cardiology;  Laterality: Left;  RN PREOP 9/21/2020-----COVID NEGATIVE ON 9/21    REVISION OF SCAR TISSUE RECTUS MUSCLE      after gall bladder surgery       ALLERGIES AND MEDICATION:     Review of patient's allergies indicates:   Allergen Reactions    Keflex [cephalexin] Itching    Moxifloxacin Itching    Penicillins Hives    Codeine Itching and Nausea Only        Medication List            Accurate as of January 7, 2025  2:10 PM. If you have any questions, ask your nurse or doctor.                CONTINUE taking these medications      albuterol 90 mcg/actuation inhaler  Commonly known as: PROVENTIL/VENTOLIN HFA  Inhale 1-2 puffs into the lungs every 6 (six) hours as needed for Wheezing or Shortness of Breath. Rescue     aspirin 81 MG EC tablet  Commonly known as: ECOTRIN  Take 1 tablet (81 mg total) by mouth once daily.     atorvastatin 80 MG tablet  Commonly known as: LIPITOR  Take 1 tablet (80 mg total) by mouth once daily.     diphenhydrAMINE 25 mg capsule  Commonly known as: BENADRYL  Take 1 each (25 mg total) by mouth every 6 (six) hours as needed for Itching.     furosemide 20 MG tablet  Commonly known as: LASIX     gabapentin 300 MG capsule  Commonly known as: NEURONTIN  Take 1 capsule (300 mg total) by mouth 3 (three) times daily.     HYDROcodone-acetaminophen 5-325 mg per tablet  Commonly known as: NORCO     levothyroxine 100 MCG tablet  Commonly known as: SYNTHROID     lisinopriL 5 MG tablet  Commonly known as: PRINIVIL,ZESTRIL     meclizine 25 mg tablet  Commonly known as: ANTIVERT  Take 1 tablet (25 mg total) by mouth 3 (three) times daily as needed for Dizziness.     metoprolol tartrate 25 MG tablet  Commonly known as: LOPRESSOR  Take 0.5 tablets (12.5 mg total) by mouth 2 (two) times daily.      nitroGLYCERIN 0.4 MG SL tablet  Commonly known as: NITROSTAT  Place 1 tablet (0.4 mg total) under the tongue every 5 (five) minutes as needed for Chest pain.     sertraline 100 MG tablet  Commonly known as: ZOLOFT     valACYclovir 1000 MG tablet  Commonly known as: VALTREX  Take 1 tablet (1,000 mg total) by mouth 3 (three) times daily. for 7 days     VITAMIN D2 50,000 unit Cap  Generic drug: ergocalciferol              SOCIAL HISTORY:     Social History     Socioeconomic History    Marital status:    Tobacco Use    Smoking status: Former     Current packs/day: 0.00     Types: Cigarettes     Quit date:      Years since quittin.0    Smokeless tobacco: Never   Substance and Sexual Activity    Alcohol use: No    Drug use: No    Sexual activity: Never     Partners: Male     Social Drivers of Health     Financial Resource Strain: High Risk (2024)    Received from [facility]    Overall Financial Resource Strain (CARDIA)     Difficulty of Paying Living Expenses: Hard   Food Insecurity: Patient Declined (2024)    Received from [facility]    Hunger Vital Sign     Worried About Running Out of Food in the Last Year: Patient declined     Ran Out of Food in the Last Year: Patient declined   Transportation Needs: Unmet Transportation Needs (2024)    Received from [facility]    PRAPARE - Transportation     Lack of Transportation (Medical): Yes     Lack of Transportation (Non-Medical): Yes   Physical Activity: Inactive (2024)    Received from [facility]    Exercise Vital Sign     Days of Exercise per Week: 0 days     Minutes of Exercise per Session: 0 min   Stress: Stress Concern Present (2024)    Received from [facility]    [employer] - Occupational Stress Questionnaire     Feeling of Stress : Very much   Housing Stability: Low Risk  (2024)    Housing Stability Vital Sign     Unable to Pay for Housing in the Last Year: No     Homeless in the Last  "Year: No       FAMILY HISTORY:   No family history on file.    REVIEW OF SYSTEMS:   Review of Systems   Constitutional:  Negative for chills, diaphoresis and fever.   HENT:  Negative for nosebleeds.    Eyes:  Negative for blurred vision, double vision and photophobia.   Respiratory:  Positive for shortness of breath. Negative for hemoptysis and wheezing.    Cardiovascular:  Positive for chest pain. Negative for palpitations, orthopnea, claudication, leg swelling and PND.   Gastrointestinal:  Negative for abdominal pain, blood in stool, heartburn, melena, nausea and vomiting.   Genitourinary:  Negative for flank pain and hematuria.   Musculoskeletal:  Negative for falls, myalgias and neck pain.   Skin:  Negative for rash.   Neurological:  Negative for dizziness, seizures, loss of consciousness, weakness and headaches.   Endo/Heme/Allergies:  Negative for polydipsia. Does not bruise/bleed easily.   Psychiatric/Behavioral:  Negative for depression and memory loss. The patient is not nervous/anxious.        PHYSICAL EXAM:     Vitals:    01/07/25 1402   BP: 108/74   Pulse: 88   Resp: 18    Body mass index is 33.24 kg/m².  Weight: 87.9 kg (193 lb 10.8 oz)   Height: 5' 4" (162.6 cm)     Physical Exam  Vitals reviewed.   Constitutional:       General: She is not in acute distress.     Appearance: She is well-developed. She is obese. She is not ill-appearing, toxic-appearing or diaphoretic.   HENT:      Head: Normocephalic and atraumatic.   Eyes:      General: No scleral icterus.     Conjunctiva/sclera: Conjunctivae normal.      Pupils: Pupils are equal, round, and reactive to light.   Neck:      Thyroid: No thyromegaly.      Vascular: Normal carotid pulses. No carotid bruit or JVD.      Trachea: Trachea normal. No tracheal deviation.   Cardiovascular:      Rate and Rhythm: Normal rate and regular rhythm.      Pulses:           Carotid pulses are 2+ on the right side and 2+ on the left side.       Radial pulses are 2+ on the " right side.      Heart sounds: S1 normal and S2 normal. Heart sounds not distant. No murmur heard.     No friction rub. No gallop.   Pulmonary:      Effort: Pulmonary effort is normal. No respiratory distress.      Breath sounds: Normal breath sounds. No stridor. No wheezing, rhonchi or rales.   Chest:      Chest wall: No tenderness.   Abdominal:      General: There is no distension.      Palpations: Abdomen is soft.   Musculoskeletal:         General: No swelling or tenderness. Normal range of motion.      Cervical back: Normal range of motion and neck supple. No edema or rigidity.   Feet:      Right foot:      Skin integrity: No ulcer.      Left foot:      Skin integrity: No ulcer.   Skin:     General: Skin is warm and dry.      Findings: No erythema or rash.   Neurological:      Mental Status: She is alert and oriented to person, place, and time.      Cranial Nerves: No cranial nerve deficit.   Psychiatric:         Speech: Speech normal.         Behavior: Behavior normal. Behavior is cooperative.         DATA:   EKG: (personally reviewed tracing(s))  9/10/20 SR 55, low volt, similar to 9/12/19    Laboratory:  CBC:  Recent Labs   Lab 05/20/24 2055 11/19/24 1657 11/20/24  0613   WBC 6.35 7.77 6.69   Hemoglobin 11.8 L 11.7 L 11.5 L   Hematocrit 36.8 L 35.8 L 35.9 L   Platelets 131 L 162 148 L       CHEMISTRIES:  Recent Labs   Lab 05/20/24 2055 11/19/24 1657 11/20/24  0613   Glucose 113 H 87 92   Sodium 138 139 146 H   Potassium 4.2 3.6 3.9   BUN 30 H 18 22   Creatinine 1.8 H 1.3 1.4   Calcium 9.9 9.6 9.6       CARDIAC BIOMARKERS:  Recent Labs   Lab 11/19/24 2047 11/20/24  0055 11/20/24  0447   Troponin I 0.011 0.008 <0.006       COAGS:  Recent Labs   Lab 11/19/24 2047   INR 1.0       LIPIDS/LFTS:  Recent Labs   Lab 04/15/24  1618 05/20/24 2055 08/16/24  0903 11/19/24 1657 11/19/24 2047 11/20/24  0613   Cholesterol 118  --  151  --  126  --    Triglycerides 67  --  80  --  53  --    HDL 60 H  --  66 H  --   58  --    LDL Cholesterol  --   --   --   --  57.4 L  --    LDL Calculated 45  --  69  --   --   --    Non-HDL Cholesterol 58  --  85  --  68  --    AST  --  18  --  18  --  17   ALT  --  13  --  13  --  12             Cardiovascular Testing:  Cath 11/22/24  LVEDP: 10mmHg  LVEF: 60% by echo  Dominance: Right  LM: normal  LAD: patent prox stent (7/25/19 2.75x26 Resolute [place] ROBERTO)  LCx: MLI  RCA: dom, prox 30-40%  Hemostasis:  RFA man comp (R radial occluded on US)  Impression:  CP with abnl MPI, ?UAP  1V CAD, normal LV fxn  Widely patent prox LAD stent  RFA man comp for hemostasis  Plan:  Cont med rx  Cont ASA/statin  Stop Plavix  Home today  Follow up with Dr. Yadav    Echo 11/21/24    Left Ventricle: The left ventricle is normal in size. Mildly increased wall thickness. There is concentric remodeling. There is normal systolic function with a visually estimated ejection fraction of 60 - 65%. There is normal diastolic function. Inconclusive left ventricular filling pressure.    Right Ventricle: Normal right ventricular cavity size. Systolic function is normal.    Mitral Valve: There is mild regurgitation.    Tricuspid Valve: There is mild regurgitation.    L MPI 11/21/24    Abnormal myocardial perfusion scan.    There is amild to moderate intensity, small to medium sized, reversible perfusion abnormality that is consistent with ischemia in the basal to mid inferior wall(s).    There are no other significant perfusion abnormalities.    The gated perfusion images showed an ejection fraction of 69% post stress.    The ECG portion of the study is negative for ischemia.    The patient reported no chest pain during the stress test.    During stress, rare PVCs are noted.    LE art US/BEE 9/12/19  No evidence of hemodynamically significant infrainguinal PAD bilaterally.  Biphasic waveforms throughout.  Normal BEE bilaterally.      ASSESSMENT:   # CAD/NSTEMI 7/25/19 s/p PCI prox LAD 2.75x26 Resolute [place] ROBERTO.  Cath 11/20254  with patent stent and nonobst CAD.  Persistent CP/NDIAYE.  # Hx ASA allergy s/p successful desensitization 7/25/19  # HLP on atorva 80mg, LDL at goal  # HTN, controlled   # BMI 33, down 4 unit(s) vs last OV  # CKD3a  # hx of bilat calf claudication, LE art US/BEE 9/2019 normal.    PLAN:   Cont med rx  Cont ASA 81mg qd indefinitely (pt prev warned to avoid stopping ASA as she may resensitize)  Refer to GI and Pulm  Diet/exercise/weight loss  RTC 6 months (July 2025)    The above documents medical care services that are part of ongoing care related to this patient's serious/complex condition (Code ). (CAD/HLP/HTN/CKD)          Carlos Alberto Yadav MD, FACC

## 2025-01-07 NOTE — TELEPHONE ENCOUNTER
----- Message from Med Assistant Alexus sent at 1/7/2025  2:29 PM CST -----  Regarding: appointment  Good afternoon,  Dr. Yadav would like patient to be seen for Other chest pain [R07.89]. please assist with scheduling and calling patient with appointment info. thanks

## 2025-04-20 ENCOUNTER — HOSPITAL ENCOUNTER (EMERGENCY)
Facility: HOSPITAL | Age: 85
Discharge: HOME | End: 2025-04-20
Attending: STUDENT IN AN ORGANIZED HEALTH CARE EDUCATION/TRAINING PROGRAM
Payer: MEDICARE

## 2025-04-20 VITALS
SYSTOLIC BLOOD PRESSURE: 147 MMHG | HEART RATE: 63 BPM | HEIGHT: 63 IN | OXYGEN SATURATION: 98 % | DIASTOLIC BLOOD PRESSURE: 67 MMHG | WEIGHT: 184 LBS | BODY MASS INDEX: 32.6 KG/M2 | RESPIRATION RATE: 18 BRPM | TEMPERATURE: 98 F

## 2025-04-20 DIAGNOSIS — R21 RASH AND NONSPECIFIC SKIN ERUPTION: Primary | ICD-10-CM

## 2025-04-20 PROCEDURE — 25000003 PHARM REV CODE 250: Performed by: PHYSICIAN ASSISTANT

## 2025-04-20 PROCEDURE — 99283 EMERGENCY DEPT VISIT LOW MDM: CPT

## 2025-04-20 RX ORDER — TRIAMCINOLONE ACETONIDE 1 MG/G
OINTMENT TOPICAL 2 TIMES DAILY
Qty: 15 G | Refills: 1 | Status: SHIPPED | OUTPATIENT
Start: 2025-04-20

## 2025-04-20 RX ORDER — TRIAMCINOLONE ACETONIDE 1 MG/G
OINTMENT TOPICAL 2 TIMES DAILY
Status: DISCONTINUED | OUTPATIENT
Start: 2025-04-20 | End: 2025-04-20 | Stop reason: HOSPADM

## 2025-04-20 RX ADMIN — TRIAMCINOLONE ACETONIDE: 1 OINTMENT TOPICAL at 09:04

## 2025-04-21 NOTE — DISCHARGE INSTRUCTIONS
Apply the topical steroid ointment twice daily for the next 7 days to see if any improvement.  Avoid direct sunlight.  Try to keep the area moist with the steroid ointment or similar ointments such as Desitin or A&D ointment.  Topical Benadryl or Calamine may help with itching and irritation.  Continue with oral Benadryl as needed for severe itching or irritation.    Follow up with your primary care provider for wound recheck.  Establish care with a local dermatologist using may provided for any persistent symptoms despite current plan.    Return to this ED if wounds become painful and tender or begins to drain foul-smelling fluid, if you develop joint swelling and discomfort, if you begin with fever or chills, if no improvement with 2-3 days of current plan, if any other problems occur.

## 2025-04-21 NOTE — ED PROVIDER NOTES
Encounter Date: 4/20/2025       History     Chief Complaint   Patient presents with    Rash     Pt reported to the ED with a CC of rash localized bilaterally to distal arms. Pt reported her symptoms started Thursday while cleaning a room. Pt denied being exposed to any harsh chemicals, new medications, perfumes, being around poison ivy, oak, or sumac.        84-year-old female presents to ED complaining of pruritic rash to bilateral wrists and forearms times 3 days.    Patient states she and her family member were cleaning out her shed on Thursday.  She states she noticed small, blistered lesion overlying her right hand dorsal 5th PIP joint while cleaning.  The following day she began with pruritic rash to bilateral wrists and forearms.  She has been applying unknown cream without improvement of symptoms.  She denies any facial, lip, tongue swelling.  She denies cough or shortness of breath.  She denies any lesions to remainder of her upper extremities, to her face, neck, chest, back, or lower extremity involvement.  Denies history of similar rash.  No open or draining wounds.  Denies fever, chills, myalgias.  No joint swelling or joint discomfort.  Symptoms are acute, constant, mild to moderate.  No alleviating or exacerbating factors.  No radiation of symptoms.    Was not wearing any gloves when cleaning the shaft.  Denies any other known exposures or encounters, no novel medications.    On antiplatelet therapy    PMH:  CKD 3  Secondary hyperparathyroidism  Acquired hypothyroidism  Hx NSTEMI  CAD s/p PCI  Renal hypertension  Hyperlipidemia  Hypertension  Depression  Anxiety  Chronic arthritis  Psoriasis  GERD  Obesity  Chronic pain  ?opioid dependence  Vitamin-D deficiency      Review of patient's allergies indicates:   Allergen Reactions    Keflex [cephalexin] Itching    Moxifloxacin Itching    Penicillins Hives    Codeine Itching and Nausea Only     Past Medical History:   Diagnosis Date    Arthritis     Chronic  kidney disease     stage 3     Coronary artery disease     Depression     Hypertension     Obesity (BMI 30-39.9)     Shingles outbreak     Thyroid disease      Past Surgical History:   Procedure Laterality Date    ABDOMINAL SURGERY      removal scar tissue abdomen    ANGIOGRAM, CORONARY, WITH LEFT HEART CATHETERIZATION Left 11/22/2024    Procedure: Angiogram, Coronary, with Left Heart Cath;  Surgeon: Carlos Alberto Yadav MD;  Location: [facility] CATH LAB;  Service: Cardiology;  Laterality: Left;  R rad access    BACK SURGERY      x3    CHOLECYSTECTOMY      CORONARY ANGIOPLASTY  07/25/2019    prox LAD 2.75x26 [place]O    HYSTERECTOMY      JOINT REPLACEMENT      Rt total knee    LEFT HEART CATHETERIZATION Left 7/25/2019    Procedure: Left heart cath R rad access;  Surgeon: Carlos Alberto Yadav MD;  Location: [facility] CATH LAB;  Service: Cardiology;  Laterality: Left;    LEFT HEART CATHETERIZATION Left 10/15/2019    Procedure: Left heart cath 10am start, R rad access;  Surgeon: Carlos Alberto Yadav MD;  Location: [facility] CATH LAB;  Service: Cardiology;  Laterality: Left;  RN PREOP 10/11/2019    LEFT HEART CATHETERIZATION Left 9/23/2020    Procedure: Left heart cath 12pm start, R rad access;  Surgeon: Carlos Alberto Yadav MD;  Location: [facility] CATH LAB;  Service: Cardiology;  Laterality: Left;  RN PREOP 9/21/2020-----COVID NEGATIVE ON 9/21    REVISION OF SCAR TISSUE RECTUS MUSCLE      after gall bladder surgery     No family history on file.  Social History[1]  Review of Systems   Constitutional:  Negative for chills and fever.   HENT:  Negative for facial swelling.    Respiratory:  Negative for cough and shortness of breath.    Musculoskeletal:  Negative for arthralgias and joint swelling.   Skin:  Positive for rash. Negative for wound.   Neurological:  Negative for syncope.       Physical Exam     Initial Vitals [04/20/25 2016]   BP Pulse Resp Temp SpO2   (!) 142/63 60 17 97.5 °F (36.4 °C) 96 %      MAP       --          Physical Exam    Nursing note and vitals reviewed.  Constitutional: She appears well-developed and well-nourished. She is not diaphoretic. No distress.   HENT:   Head: Normocephalic and atraumatic.   Neck:   Normal range of motion.  Pulmonary/Chest: No respiratory distress.   Musculoskeletal:      Cervical back: Normal range of motion.     Neurological: She is alert and oriented to person, place, and time. GCS score is 15. GCS eye subscore is 4. GCS verbal subscore is 5. GCS motor subscore is 6.   Skin: Skin is warm.   Scattered, bright erythema, papular lesions; some with macular patches or coalescence of erythema; lesions reinaldo; no open/draining wounds; crusted/dried appearance to wound edges; no vesicular lesions; no ttp.  No convincing petechiae or purpura.  Single, crusted, healing wound to right hand 5th digit overlying PIP; no tenderness.    Distribution: to dorsal hands, bilateral wrists, forearms.  No flexor or extensor surface predilection.  No palm involvement.   Psychiatric: Thought content normal.   Very mildly anxious         ED Course   Procedures  Labs Reviewed - No data to display       Imaging Results    None          Medications   triamcinolone acetonide 0.1% ointment ( Topical (Top) Given 4/20/25 2120)     Medical Decision Making  Differential diagnosis:  Irritant dermatitis, contact dermatitis, psoriasis, eczema, zoster, folliculitis, cellulitis    Amount and/or Complexity of Data Reviewed  External Data Reviewed: notes.  Discussion of management or test interpretation with external provider(s): Isolated rash to bilateral hands, wrists, and forearms.  Lesions are mostly papular with some macular patches of coalescence; there is no predilection for flexor or extensor surfaces.  There is bright erythema with well demarcated edges, lesions marjorie, no petechiae, no tenderness.  No fluctuance.  No open or draining wounds, no vesicular lesions.  There is crusted/dried appearance to lesions.   Given history of working in her shed the day before, I wonder if this is a contact dermatitis.  She has been trialing unknown cream at home.  We will start topical corticosteroids ointment to see if any improvement---advised to keep the areas moist with some sort of emollient if/when the ointment dries.  Referral placed to establish care with dermatologist although I do think she can follow up with her PCP if no improvement with current plan. Given interim return precautions.     Risk  Prescription drug management.                                      Clinical Impression:  Final diagnoses:  [R21] Rash and nonspecific skin eruption (Primary)          ED Disposition Condition    Discharge Good          ED Prescriptions       Medication Sig Dispense Start Date End Date Auth. Provider    triamcinolone acetonide 0.1% (KENALOG) 0.1 % ointment Apply topically 2 (two) times daily. 15 g 2025 -- Maximo Lam PA-C          Follow-up Information       Follow up With Specialties Details Why Contact Info    Marii Trejo MD Internal Medicine Schedule an appointment as soon as possible for a visit  For reevaluation, If symptoms persist [address] [facility] [address]  [facility] [address]  [phone]      [facility] DERMATOLOGY Dermatology Schedule an appointment as soon as possible for a visit  For wound re-check, For reevaluation, If symptoms persist [address] [facility] [place] [id number] [phone]               [1]   Social History  Tobacco Use    Smoking status: Former     Current packs/day: 0.00     Types: Cigarettes     Quit date:      Years since quittin.3    Smokeless tobacco: Never   Substance Use Topics    Alcohol use: No    Drug use: No        Maximo Lam PA-C  [id number]

## (undated) DEVICE — GUIDEWIRE TORQUE 3CM/260CML

## (undated) DEVICE — CATH JACKY RADIAL 3.5 110CM

## (undated) DEVICE — ELECTRODE EDGE SYSTEM RTS

## (undated) DEVICE — CATH NC TREK RX 3X12MM

## (undated) DEVICE — OMNIPAQUE 350MG 150ML VIAL

## (undated) DEVICE — PAD RADI FEMORAL

## (undated) DEVICE — WIRE GUIDE SAFE-T-J .035 260CM

## (undated) DEVICE — KIT GLIDESHEATH SLEND 6FR 10CM

## (undated) DEVICE — KIT HAND CONTROL HIGH PRESSUR

## (undated) DEVICE — VALVE CONTROL COPILOT

## (undated) DEVICE — PAD DEFIB CADENCE ADULT R2

## (undated) DEVICE — KIT MANIFOLD LOW PRESS TUBING

## (undated) DEVICE — PACK CATH LAB

## (undated) DEVICE — KIT SYR REUSABLE

## (undated) DEVICE — CATH INFINITI 4F JL4 .042X100

## (undated) DEVICE — INFLATOR ADVANTAGE ENCORE 26

## (undated) DEVICE — HEMOSTAT VASC BAND REG 24CM

## (undated) DEVICE — ANGIOTOUCH KIT

## (undated) DEVICE — GUIDEWIRE VERRATA + JTIP 185CM

## (undated) DEVICE — GUIDE LAUNCHER 5FR JR 4.0

## (undated) DEVICE — OMNIPAQUE CONTRAST 350MG/100ML

## (undated) DEVICE — GUIDEWIRE PROWATER .014X180CM

## (undated) DEVICE — BAND TR COMP DEVICE REG 24CM

## (undated) DEVICE — CATH TREK RX 2.50MM X 15MM

## (undated) DEVICE — GUIDE LAUNCHER 5FR EBU 3.5

## (undated) DEVICE — SHEATH INTRODUCER 4FR PINNACLE

## (undated) DEVICE — CATH INFINITI JUDKINS JR4